# Patient Record
Sex: MALE | Race: WHITE | Employment: OTHER | URBAN - METROPOLITAN AREA
[De-identification: names, ages, dates, MRNs, and addresses within clinical notes are randomized per-mention and may not be internally consistent; named-entity substitution may affect disease eponyms.]

---

## 2017-01-12 ENCOUNTER — TELEPHONE (OUTPATIENT)
Dept: FAMILY MEDICINE CLINIC | Age: 77
End: 2017-01-12

## 2017-01-20 ENCOUNTER — OFFICE VISIT (OUTPATIENT)
Dept: FAMILY MEDICINE CLINIC | Age: 77
End: 2017-01-20

## 2017-01-20 VITALS
HEART RATE: 98 BPM | OXYGEN SATURATION: 95 % | WEIGHT: 190.7 LBS | DIASTOLIC BLOOD PRESSURE: 60 MMHG | SYSTOLIC BLOOD PRESSURE: 120 MMHG | HEIGHT: 68 IN | BODY MASS INDEX: 28.9 KG/M2

## 2017-01-20 DIAGNOSIS — I65.23 BILATERAL CAROTID ARTERY STENOSIS: ICD-10-CM

## 2017-01-20 DIAGNOSIS — E78.2 MIXED HYPERLIPIDEMIA: ICD-10-CM

## 2017-01-20 DIAGNOSIS — E11.8 TYPE 2 DIABETES MELLITUS WITH COMPLICATION, WITHOUT LONG-TERM CURRENT USE OF INSULIN (HCC): ICD-10-CM

## 2017-01-20 DIAGNOSIS — R42 VERTIGO: ICD-10-CM

## 2017-01-20 DIAGNOSIS — I71.40 ABDOMINAL AORTIC ANEURYSM WITHOUT RUPTURE: ICD-10-CM

## 2017-01-20 DIAGNOSIS — E11.21 TYPE 2 DIABETES MELLITUS WITH DIABETIC NEPHROPATHY, WITHOUT LONG-TERM CURRENT USE OF INSULIN (HCC): ICD-10-CM

## 2017-01-20 DIAGNOSIS — N52.9 VASCULOGENIC ERECTILE DYSFUNCTION, UNSPECIFIED VASCULOGENIC ERECTILE DYSFUNCTION TYPE: ICD-10-CM

## 2017-01-20 PROCEDURE — 99214 OFFICE O/P EST MOD 30 MIN: CPT | Performed by: FAMILY MEDICINE

## 2017-01-20 ASSESSMENT — ENCOUNTER SYMPTOMS
ABDOMINAL PAIN: 0
EYE REDNESS: 0
CHEST TIGHTNESS: 0
EYE PAIN: 0
CHOKING: 0
PHOTOPHOBIA: 0
ANAL BLEEDING: 0
STRIDOR: 0
EYE ITCHING: 0
EYE DISCHARGE: 0
RHINORRHEA: 0
ABDOMINAL DISTENTION: 0
SINUS PRESSURE: 0
BACK PAIN: 0
FACIAL SWELLING: 0
COUGH: 0
BLOOD IN STOOL: 0
WHEEZING: 0
APNEA: 0
SHORTNESS OF BREATH: 0
COLOR CHANGE: 0

## 2017-02-21 ENCOUNTER — TELEPHONE (OUTPATIENT)
Dept: FAMILY MEDICINE CLINIC | Age: 77
End: 2017-02-21

## 2017-02-21 DIAGNOSIS — Z12.83 SKIN EXAM, SCREENING FOR CANCER: Primary | ICD-10-CM

## 2017-02-27 ENCOUNTER — TELEPHONE (OUTPATIENT)
Dept: FAMILY MEDICINE CLINIC | Age: 77
End: 2017-02-27

## 2017-02-27 RX ORDER — AMLODIPINE BESYLATE 10 MG/1
TABLET ORAL
Qty: 90 TABLET | Refills: 3 | Status: SHIPPED | OUTPATIENT
Start: 2017-02-27 | End: 2018-01-08 | Stop reason: SDUPTHER

## 2017-02-27 RX ORDER — LISINOPRIL AND HYDROCHLOROTHIAZIDE 12.5; 1 MG/1; MG/1
TABLET ORAL
Qty: 90 TABLET | Refills: 3 | Status: SHIPPED | OUTPATIENT
Start: 2017-02-27 | End: 2018-01-08 | Stop reason: SDUPTHER

## 2017-02-27 RX ORDER — PRAVASTATIN SODIUM 80 MG/1
TABLET ORAL
Qty: 90 TABLET | Refills: 3 | Status: SHIPPED | OUTPATIENT
Start: 2017-02-27 | End: 2018-01-08 | Stop reason: SDUPTHER

## 2017-04-12 ENCOUNTER — OFFICE VISIT (OUTPATIENT)
Dept: FAMILY MEDICINE CLINIC | Age: 77
End: 2017-04-12

## 2017-04-12 VITALS
DIASTOLIC BLOOD PRESSURE: 80 MMHG | BODY MASS INDEX: 29.19 KG/M2 | WEIGHT: 192.6 LBS | HEIGHT: 68 IN | HEART RATE: 97 BPM | OXYGEN SATURATION: 96 % | SYSTOLIC BLOOD PRESSURE: 130 MMHG

## 2017-04-12 DIAGNOSIS — I65.23 BILATERAL CAROTID ARTERY STENOSIS: ICD-10-CM

## 2017-04-12 DIAGNOSIS — N52.9 VASCULOGENIC ERECTILE DYSFUNCTION, UNSPECIFIED VASCULOGENIC ERECTILE DYSFUNCTION TYPE: ICD-10-CM

## 2017-04-12 DIAGNOSIS — I10 ESSENTIAL HYPERTENSION: ICD-10-CM

## 2017-04-12 DIAGNOSIS — R80.9 MICROALBUMINURIA: ICD-10-CM

## 2017-04-12 DIAGNOSIS — Z00.00 WELL ADULT EXAM: Primary | ICD-10-CM

## 2017-04-12 DIAGNOSIS — I71.40 ABDOMINAL AORTIC ANEURYSM WITHOUT RUPTURE: ICD-10-CM

## 2017-04-12 DIAGNOSIS — E11.8 TYPE 2 DIABETES MELLITUS WITH COMPLICATION, WITHOUT LONG-TERM CURRENT USE OF INSULIN (HCC): ICD-10-CM

## 2017-04-12 LAB
A/G RATIO: 1.6 (ref 1.1–2.2)
ALBUMIN SERPL-MCNC: 4.2 G/DL (ref 3.4–5)
ALP BLD-CCNC: 71 U/L (ref 40–129)
ALT SERPL-CCNC: 13 U/L (ref 10–40)
ANION GAP SERPL CALCULATED.3IONS-SCNC: 14 MMOL/L (ref 3–16)
AST SERPL-CCNC: 14 U/L (ref 15–37)
BILIRUB SERPL-MCNC: <0.2 MG/DL (ref 0–1)
BUN BLDV-MCNC: 24 MG/DL (ref 7–20)
CALCIUM SERPL-MCNC: 10 MG/DL (ref 8.3–10.6)
CHLORIDE BLD-SCNC: 99 MMOL/L (ref 99–110)
CHOLESTEROL, TOTAL: 150 MG/DL (ref 0–199)
CO2: 29 MMOL/L (ref 21–32)
CREAT SERPL-MCNC: 1.2 MG/DL (ref 0.8–1.3)
CREATININE URINE: 84.8 MG/DL (ref 39–259)
GFR AFRICAN AMERICAN: >60
GFR NON-AFRICAN AMERICAN: 58
GLOBULIN: 2.7 G/DL
GLUCOSE BLD-MCNC: 114 MG/DL (ref 70–99)
HDLC SERPL-MCNC: 40 MG/DL (ref 40–60)
LDL CHOLESTEROL CALCULATED: 73 MG/DL
MICROALBUMIN UR-MCNC: 4.4 MG/DL
MICROALBUMIN/CREAT UR-RTO: 51.9 MG/G (ref 0–30)
POTASSIUM SERPL-SCNC: 4.9 MMOL/L (ref 3.5–5.1)
SODIUM BLD-SCNC: 142 MMOL/L (ref 136–145)
TOTAL PROTEIN: 6.9 G/DL (ref 6.4–8.2)
TRIGL SERPL-MCNC: 187 MG/DL (ref 0–150)
VLDLC SERPL CALC-MCNC: 37 MG/DL

## 2017-04-12 PROCEDURE — 93000 ELECTROCARDIOGRAM COMPLETE: CPT | Performed by: FAMILY MEDICINE

## 2017-04-12 PROCEDURE — G0439 PPPS, SUBSEQ VISIT: HCPCS | Performed by: FAMILY MEDICINE

## 2017-04-13 LAB
BASOPHILS ABSOLUTE: 0 K/UL (ref 0–0.2)
BASOPHILS RELATIVE PERCENT: 0.5 %
EOSINOPHILS ABSOLUTE: 0.3 K/UL (ref 0–0.6)
EOSINOPHILS RELATIVE PERCENT: 4.1 %
ESTIMATED AVERAGE GLUCOSE: 168.6 MG/DL
HBA1C MFR BLD: 7.5 %
HCT VFR BLD CALC: 48.3 % (ref 40.5–52.5)
HEMOGLOBIN: 15.3 G/DL (ref 13.5–17.5)
LYMPHOCYTES ABSOLUTE: 2.5 K/UL (ref 1–5.1)
LYMPHOCYTES RELATIVE PERCENT: 31.7 %
MCH RBC QN AUTO: 29.3 PG (ref 26–34)
MCHC RBC AUTO-ENTMCNC: 31.7 G/DL (ref 31–36)
MCV RBC AUTO: 92.4 FL (ref 80–100)
MONOCYTES ABSOLUTE: 0.8 K/UL (ref 0–1.3)
MONOCYTES RELATIVE PERCENT: 10.3 %
NEUTROPHILS ABSOLUTE: 4.2 K/UL (ref 1.7–7.7)
NEUTROPHILS RELATIVE PERCENT: 53.4 %
PDW BLD-RTO: 14.2 % (ref 12.4–15.4)
PLATELET # BLD: 264 K/UL (ref 135–450)
PMV BLD AUTO: 9.5 FL (ref 5–10.5)
RBC # BLD: 5.23 M/UL (ref 4.2–5.9)
WBC # BLD: 7.9 K/UL (ref 4–11)

## 2017-06-19 ENCOUNTER — TELEPHONE (OUTPATIENT)
Dept: FAMILY MEDICINE CLINIC | Age: 77
End: 2017-06-19

## 2018-01-08 RX ORDER — PRAVASTATIN SODIUM 80 MG/1
TABLET ORAL
Qty: 90 TABLET | Refills: 3 | Status: SHIPPED | OUTPATIENT
Start: 2018-01-08 | End: 2018-02-05 | Stop reason: SDUPTHER

## 2018-01-08 RX ORDER — LISINOPRIL AND HYDROCHLOROTHIAZIDE 12.5; 1 MG/1; MG/1
TABLET ORAL
Qty: 90 TABLET | Refills: 3 | Status: SHIPPED | OUTPATIENT
Start: 2018-01-08 | End: 2018-02-05 | Stop reason: SDUPTHER

## 2018-01-08 RX ORDER — GLUCOSAM/CHON-MSM1/C/MANG/BOSW 500-416.6
100 TABLET ORAL 2 TIMES DAILY WITH MEALS
Qty: 100 EACH | Refills: 5 | Status: SHIPPED | OUTPATIENT
Start: 2018-01-08 | End: 2018-02-05 | Stop reason: SDUPTHER

## 2018-01-08 RX ORDER — BLOOD-GLUCOSE METER
EACH MISCELLANEOUS
Qty: 100 DEVICE | Refills: 3 | Status: SHIPPED | OUTPATIENT
Start: 2018-01-08 | End: 2018-02-05 | Stop reason: SDUPTHER

## 2018-01-08 RX ORDER — AMLODIPINE BESYLATE 10 MG/1
TABLET ORAL
Qty: 90 TABLET | Refills: 3 | Status: SHIPPED | OUTPATIENT
Start: 2018-01-08 | End: 2018-02-05 | Stop reason: SDUPTHER

## 2018-01-08 RX ORDER — BLOOD-GLUCOSE CONTROL, LOW
EACH MISCELLANEOUS
Qty: 6 EACH | Refills: 3 | Status: SHIPPED | OUTPATIENT
Start: 2018-01-08 | End: 2018-02-05 | Stop reason: SDUPTHER

## 2018-02-05 RX ORDER — AMLODIPINE BESYLATE 10 MG/1
TABLET ORAL
Qty: 90 TABLET | Refills: 3 | Status: SHIPPED | OUTPATIENT
Start: 2018-02-05 | End: 2019-04-29 | Stop reason: SDUPTHER

## 2018-02-05 RX ORDER — PRAVASTATIN SODIUM 80 MG/1
TABLET ORAL
Qty: 90 TABLET | Refills: 3 | Status: SHIPPED | OUTPATIENT
Start: 2018-02-05 | End: 2019-04-29 | Stop reason: SDUPTHER

## 2018-02-05 RX ORDER — BLOOD-GLUCOSE CONTROL, LOW
EACH MISCELLANEOUS
Qty: 6 EACH | Refills: 3 | Status: SHIPPED | OUTPATIENT
Start: 2018-02-05

## 2018-02-05 RX ORDER — LISINOPRIL AND HYDROCHLOROTHIAZIDE 12.5; 1 MG/1; MG/1
TABLET ORAL
Qty: 90 TABLET | Refills: 3 | Status: SHIPPED | OUTPATIENT
Start: 2018-02-05 | End: 2019-04-29 | Stop reason: SDUPTHER

## 2018-02-05 RX ORDER — BLOOD-GLUCOSE METER
EACH MISCELLANEOUS
Qty: 100 DEVICE | Refills: 3 | Status: SHIPPED | OUTPATIENT
Start: 2018-02-05

## 2018-02-05 RX ORDER — GLUCOSAM/CHON-MSM1/C/MANG/BOSW 500-416.6
100 TABLET ORAL 2 TIMES DAILY WITH MEALS
Qty: 100 EACH | Refills: 5 | Status: SHIPPED | OUTPATIENT
Start: 2018-02-05

## 2018-04-30 ENCOUNTER — OFFICE VISIT (OUTPATIENT)
Dept: FAMILY MEDICINE CLINIC | Age: 78
End: 2018-04-30

## 2018-04-30 VITALS
BODY MASS INDEX: 30.58 KG/M2 | SYSTOLIC BLOOD PRESSURE: 120 MMHG | WEIGHT: 201.8 LBS | HEIGHT: 68 IN | HEART RATE: 91 BPM | OXYGEN SATURATION: 95 % | DIASTOLIC BLOOD PRESSURE: 80 MMHG

## 2018-04-30 DIAGNOSIS — E78.2 MIXED HYPERLIPIDEMIA: ICD-10-CM

## 2018-04-30 DIAGNOSIS — E11.8 TYPE 2 DIABETES MELLITUS WITH COMPLICATION, WITHOUT LONG-TERM CURRENT USE OF INSULIN (HCC): ICD-10-CM

## 2018-04-30 DIAGNOSIS — E55.9 VITAMIN D DEFICIENCY: ICD-10-CM

## 2018-04-30 DIAGNOSIS — Z00.00 WELL ADULT EXAM: Primary | ICD-10-CM

## 2018-04-30 DIAGNOSIS — I65.23 BILATERAL CAROTID ARTERY STENOSIS: ICD-10-CM

## 2018-04-30 DIAGNOSIS — I71.40 ABDOMINAL AORTIC ANEURYSM WITHOUT RUPTURE: ICD-10-CM

## 2018-04-30 DIAGNOSIS — R80.9 MICROALBUMINURIA: ICD-10-CM

## 2018-04-30 LAB
A/G RATIO: 1.5 (ref 1.1–2.2)
ALBUMIN SERPL-MCNC: 4.5 G/DL (ref 3.4–5)
ALP BLD-CCNC: 78 U/L (ref 40–129)
ALT SERPL-CCNC: 18 U/L (ref 10–40)
ANION GAP SERPL CALCULATED.3IONS-SCNC: 15 MMOL/L (ref 3–16)
AST SERPL-CCNC: 16 U/L (ref 15–37)
BASOPHILS ABSOLUTE: 0 K/UL (ref 0–0.2)
BASOPHILS RELATIVE PERCENT: 0.5 %
BILIRUB SERPL-MCNC: 0.4 MG/DL (ref 0–1)
BUN BLDV-MCNC: 20 MG/DL (ref 7–20)
CALCIUM SERPL-MCNC: 10.1 MG/DL (ref 8.3–10.6)
CHLORIDE BLD-SCNC: 97 MMOL/L (ref 99–110)
CHOLESTEROL, TOTAL: 153 MG/DL (ref 0–199)
CO2: 28 MMOL/L (ref 21–32)
CREAT SERPL-MCNC: 1.3 MG/DL (ref 0.8–1.3)
CREATININE URINE: 87.9 MG/DL (ref 39–259)
EOSINOPHILS ABSOLUTE: 0.3 K/UL (ref 0–0.6)
EOSINOPHILS RELATIVE PERCENT: 3.5 %
GFR AFRICAN AMERICAN: >60
GFR NON-AFRICAN AMERICAN: 53
GLOBULIN: 3 G/DL
GLUCOSE BLD-MCNC: 171 MG/DL (ref 70–99)
HCT VFR BLD CALC: 47.2 % (ref 40.5–52.5)
HDLC SERPL-MCNC: 45 MG/DL (ref 40–60)
HEMOGLOBIN: 15.5 G/DL (ref 13.5–17.5)
LDL CHOLESTEROL CALCULATED: 78 MG/DL
LYMPHOCYTES ABSOLUTE: 2.3 K/UL (ref 1–5.1)
LYMPHOCYTES RELATIVE PERCENT: 30.7 %
MCH RBC QN AUTO: 29.5 PG (ref 26–34)
MCHC RBC AUTO-ENTMCNC: 32.9 G/DL (ref 31–36)
MCV RBC AUTO: 89.7 FL (ref 80–100)
MICROALBUMIN UR-MCNC: 9.8 MG/DL
MICROALBUMIN/CREAT UR-RTO: 111.5 MG/G (ref 0–30)
MONOCYTES ABSOLUTE: 0.7 K/UL (ref 0–1.3)
MONOCYTES RELATIVE PERCENT: 9.9 %
NEUTROPHILS ABSOLUTE: 4.2 K/UL (ref 1.7–7.7)
NEUTROPHILS RELATIVE PERCENT: 55.4 %
PDW BLD-RTO: 13.2 % (ref 12.4–15.4)
PLATELET # BLD: 292 K/UL (ref 135–450)
PMV BLD AUTO: 9.4 FL (ref 5–10.5)
POTASSIUM SERPL-SCNC: 4.6 MMOL/L (ref 3.5–5.1)
RBC # BLD: 5.26 M/UL (ref 4.2–5.9)
SODIUM BLD-SCNC: 140 MMOL/L (ref 136–145)
TOTAL PROTEIN: 7.5 G/DL (ref 6.4–8.2)
TRIGL SERPL-MCNC: 150 MG/DL (ref 0–150)
VITAMIN D 25-HYDROXY: 52.8 NG/ML
VLDLC SERPL CALC-MCNC: 30 MG/DL
WBC # BLD: 7.5 K/UL (ref 4–11)

## 2018-04-30 PROCEDURE — G8598 ASA/ANTIPLAT THER USED: HCPCS | Performed by: FAMILY MEDICINE

## 2018-04-30 PROCEDURE — 4040F PNEUMOC VAC/ADMIN/RCVD: CPT | Performed by: FAMILY MEDICINE

## 2018-04-30 PROCEDURE — G0439 PPPS, SUBSEQ VISIT: HCPCS | Performed by: FAMILY MEDICINE

## 2018-04-30 ASSESSMENT — PATIENT HEALTH QUESTIONNAIRE - PHQ9
2. FEELING DOWN, DEPRESSED OR HOPELESS: 0
SUM OF ALL RESPONSES TO PHQ9 QUESTIONS 1 & 2: 0
SUM OF ALL RESPONSES TO PHQ QUESTIONS 1-9: 0
1. LITTLE INTEREST OR PLEASURE IN DOING THINGS: 0

## 2018-05-01 LAB
ESTIMATED AVERAGE GLUCOSE: 200.1 MG/DL
HBA1C MFR BLD: 8.6 %

## 2018-08-01 ENCOUNTER — OFFICE VISIT (OUTPATIENT)
Dept: FAMILY MEDICINE CLINIC | Age: 78
End: 2018-08-01

## 2018-08-01 VITALS
BODY MASS INDEX: 29.58 KG/M2 | OXYGEN SATURATION: 96 % | HEART RATE: 81 BPM | SYSTOLIC BLOOD PRESSURE: 120 MMHG | WEIGHT: 195.2 LBS | HEIGHT: 68 IN | DIASTOLIC BLOOD PRESSURE: 78 MMHG

## 2018-08-01 DIAGNOSIS — E78.2 MIXED HYPERLIPIDEMIA: ICD-10-CM

## 2018-08-01 DIAGNOSIS — I10 ESSENTIAL HYPERTENSION: ICD-10-CM

## 2018-08-01 DIAGNOSIS — I71.40 ABDOMINAL AORTIC ANEURYSM WITHOUT RUPTURE: ICD-10-CM

## 2018-08-01 DIAGNOSIS — E11.8 TYPE 2 DIABETES MELLITUS WITH COMPLICATION, WITHOUT LONG-TERM CURRENT USE OF INSULIN (HCC): Primary | ICD-10-CM

## 2018-08-01 DIAGNOSIS — I65.23 BILATERAL CAROTID ARTERY STENOSIS: ICD-10-CM

## 2018-08-01 LAB
ANION GAP SERPL CALCULATED.3IONS-SCNC: 17 MMOL/L (ref 3–16)
BUN BLDV-MCNC: 26 MG/DL (ref 7–20)
CALCIUM SERPL-MCNC: 10.4 MG/DL (ref 8.3–10.6)
CHLORIDE BLD-SCNC: 102 MMOL/L (ref 99–110)
CO2: 24 MMOL/L (ref 21–32)
CREAT SERPL-MCNC: 1.7 MG/DL (ref 0.8–1.3)
GFR AFRICAN AMERICAN: 47
GFR NON-AFRICAN AMERICAN: 39
GLUCOSE BLD-MCNC: 124 MG/DL (ref 70–99)
POTASSIUM SERPL-SCNC: 4.8 MMOL/L (ref 3.5–5.1)
SODIUM BLD-SCNC: 143 MMOL/L (ref 136–145)

## 2018-08-01 PROCEDURE — G8427 DOCREV CUR MEDS BY ELIG CLIN: HCPCS | Performed by: FAMILY MEDICINE

## 2018-08-01 PROCEDURE — G8598 ASA/ANTIPLAT THER USED: HCPCS | Performed by: FAMILY MEDICINE

## 2018-08-01 PROCEDURE — 1101F PT FALLS ASSESS-DOCD LE1/YR: CPT | Performed by: FAMILY MEDICINE

## 2018-08-01 PROCEDURE — G8417 CALC BMI ABV UP PARAM F/U: HCPCS | Performed by: FAMILY MEDICINE

## 2018-08-01 PROCEDURE — 1123F ACP DISCUSS/DSCN MKR DOCD: CPT | Performed by: FAMILY MEDICINE

## 2018-08-01 PROCEDURE — 1036F TOBACCO NON-USER: CPT | Performed by: FAMILY MEDICINE

## 2018-08-01 PROCEDURE — 4040F PNEUMOC VAC/ADMIN/RCVD: CPT | Performed by: FAMILY MEDICINE

## 2018-08-01 PROCEDURE — 99214 OFFICE O/P EST MOD 30 MIN: CPT | Performed by: FAMILY MEDICINE

## 2018-08-01 ASSESSMENT — ENCOUNTER SYMPTOMS
ABDOMINAL PAIN: 0
WHEEZING: 0
PHOTOPHOBIA: 0
ANAL BLEEDING: 0
ABDOMINAL DISTENTION: 0
COLOR CHANGE: 0
CHEST TIGHTNESS: 0
EYE ITCHING: 0
FACIAL SWELLING: 0
CHOKING: 0
BLOOD IN STOOL: 0
EYE PAIN: 0
RHINORRHEA: 0
COUGH: 0
APNEA: 0
BACK PAIN: 0
EYE REDNESS: 0
SHORTNESS OF BREATH: 0
EYE DISCHARGE: 0
SINUS PRESSURE: 0
STRIDOR: 0

## 2018-08-01 NOTE — PROGRESS NOTES
Subjective:      Patient ID: Noram Pereira is a 76 y.o. male. HPI   Treatment Adherence:   Medication compliance:  compliant most of the time  Diet compliance:  compliant most of the time  Weight trend: stable  Current exercise: walks 3 time(s) per week  Barriers: none    Diabetes Mellitus Type 2: Current symptoms/problems include none. Home blood sugar records: fasting range: 140  Any episodes of hypoglycemia? no  Eye exam current (within one year): no  Tobacco history: He  reports that he quit smoking about 12 years ago. He has never used smokeless tobacco.   Daily Aspirin? Yes    Hypertension:  Home blood pressure monitoring: No.  He is adherent to a low sodium diet. Patient denies chest pain, shortness of breath, headache, lightheadedness, blurred vision, peripheral edema, palpitations, dry cough and fatigue. Antihypertensive medication side effects: no medication side effects noted. Use of agents associated with hypertension: none. Hyperlipidemia:  No new myalgias or GI upset on pravastatin (Pravachol). Lab Results   Component Value Date    LABA1C 8.6 04/30/2018    LABA1C 7.5 04/12/2017    LABA1C 7.6 04/08/2016     Lab Results   Component Value Date    LABMICR 9.80 (H) 04/30/2018    CREATININE 1.3 04/30/2018     Lab Results   Component Value Date    ALT 18 04/30/2018    AST 16 04/30/2018     Lab Results   Component Value Date    CHOL 153 04/30/2018    TRIG 150 04/30/2018    HDL 45 04/30/2018    LDLCALC 78 04/30/2018        has No Known Allergies.       Current Outpatient Prescriptions:     metFORMIN (GLUCOPHAGE) 500 MG tablet, Take 2 tablets by mouth 2 times daily (with meals) (Patient taking differently: Take 1,000 mg by mouth daily (with breakfast) ), Disp: 360 tablet, Rfl: 3    amLODIPine (NORVASC) 10 MG tablet, TAKE 1 TABLET EVERY DAY, Disp: 90 tablet, Rfl: 3    pravastatin (PRAVACHOL) 80 MG tablet, TAKE 1 TABLET EVERY DAY, Disp: 90 tablet, Rfl: 3    lisinopril-hydrochlorothiazide (PRINZIDE;ZESTORETIC) 10-12.5 MG per tablet, TAKE 1 TABLET EVERY DAY, Disp: 90 tablet, Rfl: 3    Blood Glucose Monitoring Suppl (TRUE METRIX AIR GLUCOSE METER) GISSELLE, Check BID, Disp: 100 Device, Rfl: 3    glucose blood VI test strips (TRUE METRIX BLOOD GLUCOSE TEST) strip, 1 each by In Vitro route daily As needed. , Disp: 100 each, Rfl: 3    TRUEPLUS LANCETS 33G MISC, 100 each by Does not apply route 2 times daily (with meals), Disp: 100 each, Rfl: 5    Blood Glucose Calibration (TRUE METRIX LEVEL 2) Normal SOLN, Check weekly, Disp: 6 each, Rfl: 3    nitroGLYCERIN (NITROSTAT) 0.4 MG SL tablet, Place 1 tablet under the tongue every 5 minutes as needed, Disp: 25 tablet, Rfl: 3    Ascorbic Acid (VITAMIN C) 500 MG tablet, Take 500 mg by mouth daily. , Disp: , Rfl:     aspirin 81 MG chewable tablet, Take 81 mg by mouth daily. , Disp: , Rfl:      has a past medical history of CAD (coronary artery disease); Hyperlipidemia; Hypertension; and Type II or unspecified type diabetes mellitus without mention of complication, not stated as uncontrolled. Past Surgical History:   Procedure Laterality Date    CORONARY ANGIOPLASTY WITH STENT PLACEMENT  2004        reports that he quit smoking about 12 years ago. He has never used smokeless tobacco. He reports that he drinks about 1.2 oz of alcohol per week . He reports that he does not use drugs. family history includes Emphysema in his father; Heart Disease in his father and mother; High Cholesterol in his sister.   Immunization History   Administered Date(s) Administered    Influenza Vaccine, unspecified formulation 01/11/2017    Influenza Virus Vaccine 10/10/2011, 12/18/2012    Influenza, High Dose 11/06/2015    Pneumococcal 13-valent Conjugate ([id number]) 11/06/2015    Pneumococcal Polysaccharide ([id number]) 10/21/2008    Tdap (Boostrix, Adacel) 04/13/2015    Zoster Live (Zostavax) 12/18/2012     Review of Systems   Constitutional: Negative for activity change, appetite change, chills, diaphoresis, fatigue, fever and unexpected weight change. HENT: Negative for congestion, dental problem, drooling, ear discharge, ear pain, facial swelling, hearing loss, nosebleeds, postnasal drip, rhinorrhea, sinus pressure, sneezing and tinnitus. Eyes: Negative for photophobia, pain, discharge, redness, itching and visual disturbance. Respiratory: Negative for apnea, cough, choking, chest tightness, shortness of breath, wheezing and stridor. Cardiovascular: Negative for chest pain, palpitations and leg swelling. Gastrointestinal: Negative for abdominal distention, abdominal pain, anal bleeding and blood in stool. Genitourinary: Negative for decreased urine volume, difficulty urinating, discharge, dysuria, enuresis, flank pain, frequency, genital sores, hematuria, penile pain, penile swelling, scrotal swelling, testicular pain and urgency. Musculoskeletal: Negative for arthralgias, back pain, gait problem, joint swelling, myalgias, neck pain and neck stiffness. Skin: Negative for color change, pallor and rash. Neurological: Negative for dizziness, tremors, seizures, syncope, facial asymmetry, speech difficulty, weakness, light-headedness, numbness and headaches. Hematological: Negative for adenopathy. Does not bruise/bleed easily. Psychiatric/Behavioral: Negative for agitation, behavioral problems, confusion, decreased concentration, dysphoric mood, hallucinations, self-injury and sleep disturbance. The patient is not nervous/anxious and is not hyperactive. BP Readings from Last 2 Encounters:   08/01/18 120/78   04/30/18 120/80     Hemoglobin A1C (%)   Date Value   04/30/2018 8.6     Microalbumin, Random Urine (mg/dL)   Date Value   04/30/2018 9.80 (H)     LDL Calculated (mg/dL)   Date Value   04/30/2018 78              Tobacco use:  Patient  reports that he quit smoking about 12 years ago.  He has never used smokeless tobacco.    If Smoker - Cessation 89.7  80.0 - 100.0 fL Final    MCH 04/30/2018 29.5  26.0 - 34.0 pg Final    MCHC 04/30/2018 32.9  31.0 - 36.0 g/dL Final    RDW 04/30/2018 13.2  12.4 - 15.4 % Final    Platelets 16/68/9289 292  135 - 450 K/uL Final    MPV 04/30/2018 9.4  5.0 - 10.5 fL Final    Neutrophils % 04/30/2018 55.4  % Final    Lymphocytes % 04/30/2018 30.7  % Final    Monocytes % 04/30/2018 9.9  % Final    Eosinophils % 04/30/2018 3.5  % Final    Basophils % 04/30/2018 0.5  % Final    Neutrophils # 04/30/2018 4.2  1.7 - 7.7 K/uL Final    Lymphocytes # 04/30/2018 2.3  1.0 - 5.1 K/uL Final    Monocytes # 04/30/2018 0.7  0.0 - 1.3 K/uL Final    Eosinophils # 04/30/2018 0.3  0.0 - 0.6 K/uL Final    Basophils # 04/30/2018 0.0  0.0 - 0.2 K/uL Final    Vit D, 25-Hydroxy 04/30/2018 52.8  >=30 ng/mL Final       Assessment:      Abdominal aortic aneurysm without rupture ([facility] [address]  Recheck with screen this year    Bilateral carotid artery stenosis  Will recheck with screen    Diabetes mellitus type 2 with complications ([facility] [address]  Labs--reviewed home numbers and OK    Essential hypertension  Stable with current plan--TOS and SE discussed    Hyperlipidemia  Labs and TOS          Plan:      Keila Sanchez received counseling on the following healthy behaviors: nutrition and medication adherence    Patient given educational materials on Diabetes, Hyperlipidemia and Hypertension    I have instructed Keila Sanchez to complete a self tracking handout on Blood Sugars  and Blood Pressures  and instructed them to bring it with them to his next appointment. Discussed use, benefit, and side effects of prescribed medications. Barriers to medication compliance addressed. All patient questions answered. Pt voiced understanding.

## 2018-08-02 LAB
ESTIMATED AVERAGE GLUCOSE: 171.4 MG/DL
HBA1C MFR BLD: 7.6 %

## 2019-04-29 RX ORDER — AMLODIPINE BESYLATE 10 MG/1
TABLET ORAL
Qty: 90 TABLET | Refills: 3 | Status: SHIPPED | OUTPATIENT
Start: 2019-04-29 | End: 2019-05-03 | Stop reason: SDUPTHER

## 2019-04-29 RX ORDER — PRAVASTATIN SODIUM 80 MG/1
TABLET ORAL
Qty: 90 TABLET | Refills: 3 | Status: SHIPPED | OUTPATIENT
Start: 2019-04-29 | End: 2019-05-03 | Stop reason: SDUPTHER

## 2019-04-29 RX ORDER — LISINOPRIL AND HYDROCHLOROTHIAZIDE 12.5; 1 MG/1; MG/1
TABLET ORAL
Qty: 90 TABLET | Refills: 3 | Status: SHIPPED | OUTPATIENT
Start: 2019-04-29 | End: 2019-05-03 | Stop reason: SDUPTHER

## 2019-05-03 ENCOUNTER — OFFICE VISIT (OUTPATIENT)
Dept: FAMILY MEDICINE CLINIC | Age: 79
End: 2019-05-03
Payer: MEDICARE

## 2019-05-03 VITALS
BODY MASS INDEX: 29.97 KG/M2 | DIASTOLIC BLOOD PRESSURE: 62 MMHG | WEIGHT: 194.2 LBS | SYSTOLIC BLOOD PRESSURE: 130 MMHG | OXYGEN SATURATION: 93 % | HEART RATE: 107 BPM

## 2019-05-03 DIAGNOSIS — Z00.00 ROUTINE GENERAL MEDICAL EXAMINATION AT A HEALTH CARE FACILITY: ICD-10-CM

## 2019-05-03 DIAGNOSIS — I10 ESSENTIAL HYPERTENSION: ICD-10-CM

## 2019-05-03 DIAGNOSIS — E11.8 TYPE 2 DIABETES MELLITUS WITH COMPLICATION, WITHOUT LONG-TERM CURRENT USE OF INSULIN (HCC): ICD-10-CM

## 2019-05-03 DIAGNOSIS — I71.40 ABDOMINAL AORTIC ANEURYSM WITHOUT RUPTURE: ICD-10-CM

## 2019-05-03 DIAGNOSIS — I25.10 CORONARY ARTERY DISEASE INVOLVING NATIVE HEART WITHOUT ANGINA PECTORIS, UNSPECIFIED VESSEL OR LESION TYPE: ICD-10-CM

## 2019-05-03 DIAGNOSIS — E78.2 MIXED HYPERLIPIDEMIA: ICD-10-CM

## 2019-05-03 DIAGNOSIS — E55.9 VITAMIN D DEFICIENCY: ICD-10-CM

## 2019-05-03 DIAGNOSIS — I65.23 BILATERAL CAROTID ARTERY STENOSIS: ICD-10-CM

## 2019-05-03 DIAGNOSIS — Z00.00 WELL ADULT EXAM: Primary | ICD-10-CM

## 2019-05-03 PROCEDURE — 4040F PNEUMOC VAC/ADMIN/RCVD: CPT | Performed by: FAMILY MEDICINE

## 2019-05-03 PROCEDURE — G0438 PPPS, INITIAL VISIT: HCPCS | Performed by: FAMILY MEDICINE

## 2019-05-03 PROCEDURE — 1123F ACP DISCUSS/DSCN MKR DOCD: CPT | Performed by: FAMILY MEDICINE

## 2019-05-03 PROCEDURE — G8598 ASA/ANTIPLAT THER USED: HCPCS | Performed by: FAMILY MEDICINE

## 2019-05-03 RX ORDER — PRAVASTATIN SODIUM 80 MG/1
TABLET ORAL
Qty: 90 TABLET | Refills: 3 | Status: CANCELLED | OUTPATIENT
Start: 2019-05-03

## 2019-05-03 RX ORDER — LISINOPRIL AND HYDROCHLOROTHIAZIDE 12.5; 1 MG/1; MG/1
TABLET ORAL
Qty: 90 TABLET | Refills: 3 | Status: SHIPPED | OUTPATIENT
Start: 2019-05-03 | End: 2019-10-23 | Stop reason: ALTCHOICE

## 2019-05-03 RX ORDER — AMLODIPINE BESYLATE 10 MG/1
TABLET ORAL
Qty: 90 TABLET | Refills: 3 | Status: SHIPPED | OUTPATIENT
Start: 2019-05-03 | End: 2019-10-23 | Stop reason: SDUPTHER

## 2019-05-03 RX ORDER — LISINOPRIL AND HYDROCHLOROTHIAZIDE 12.5; 1 MG/1; MG/1
TABLET ORAL
Qty: 90 TABLET | Refills: 3 | Status: CANCELLED | OUTPATIENT
Start: 2019-05-03

## 2019-05-03 RX ORDER — PRAVASTATIN SODIUM 80 MG/1
TABLET ORAL
Qty: 90 TABLET | Refills: 3 | Status: SHIPPED | OUTPATIENT
Start: 2019-05-03 | End: 2019-10-23 | Stop reason: ALTCHOICE

## 2019-05-03 RX ORDER — AMLODIPINE BESYLATE 10 MG/1
TABLET ORAL
Qty: 90 TABLET | Refills: 3 | Status: CANCELLED | OUTPATIENT
Start: 2019-05-03

## 2019-05-03 ASSESSMENT — LIFESTYLE VARIABLES
HOW OFTEN DURING THE LAST YEAR HAVE YOU FOUND THAT YOU WERE NOT ABLE TO STOP DRINKING ONCE YOU HAD STARTED: 0
AUDIT-C TOTAL SCORE: 2
HOW OFTEN DURING THE LAST YEAR HAVE YOU HAD A FEELING OF GUILT OR REMORSE AFTER DRINKING: 0
HOW OFTEN DURING THE LAST YEAR HAVE YOU NEEDED AN ALCOHOLIC DRINK FIRST THING IN THE MORNING TO GET YOURSELF GOING AFTER A NIGHT OF HEAVY DRINKING: 0
AUDIT TOTAL SCORE: 2
HOW OFTEN DURING THE LAST YEAR HAVE YOU BEEN UNABLE TO REMEMBER WHAT HAPPENED THE NIGHT BEFORE BECAUSE YOU HAD BEEN DRINKING: 0
HOW OFTEN DO YOU HAVE A DRINK CONTAINING ALCOHOL: 2
HOW MANY STANDARD DRINKS CONTAINING ALCOHOL DO YOU HAVE ON A TYPICAL DAY: 0
HOW OFTEN DURING THE LAST YEAR HAVE YOU FAILED TO DO WHAT WAS NORMALLY EXPECTED FROM YOU BECAUSE OF DRINKING: 0
HOW OFTEN DO YOU HAVE SIX OR MORE DRINKS ON ONE OCCASION: 0
HAS A RELATIVE, FRIEND, DOCTOR, OR ANOTHER HEALTH PROFESSIONAL EXPRESSED CONCERN ABOUT YOUR DRINKING OR SUGGESTED YOU CUT DOWN: 0
HAVE YOU OR SOMEONE ELSE BEEN INJURED AS A RESULT OF YOUR DRINKING: 0

## 2019-05-03 ASSESSMENT — ANXIETY QUESTIONNAIRES: GAD7 TOTAL SCORE: 0

## 2019-05-03 ASSESSMENT — PATIENT HEALTH QUESTIONNAIRE - PHQ9
SUM OF ALL RESPONSES TO PHQ QUESTIONS 1-9: 0
SUM OF ALL RESPONSES TO PHQ QUESTIONS 1-9: 0

## 2019-05-03 NOTE — PATIENT INSTRUCTIONS
Personalized Preventive Plan for Ray Naqvi - 5/3/2019  Medicare offers a range of preventive health benefits. Some of the tests and screenings are paid in full while other may be subject to a deductible, co-insurance, and/or copay. Some of these benefits include a comprehensive review of your medical history including lifestyle, illnesses that may run in your family, and various assessments and screenings as appropriate. After reviewing your medical record and screening and assessments performed today your provider may have ordered immunizations, labs, imaging, and/or referrals for you. A list of these orders (if applicable) as well as your Preventive Care list are included within your After Visit Summary for your review. Other Preventive Recommendations:    · A preventive eye exam performed by an eye specialist is recommended every 1-2 years to screen for glaucoma; cataracts, macular degeneration, and other eye disorders. · A preventive dental visit is recommended every 6 months. · Try to get at least 150 minutes of exercise per week or 10,000 steps per day on a pedometer . · Order or download the FREE \"Exercise & Physical Activity: Your Everyday Guide\" from [employer]. Call 8-493.163.9771 or search [employer] online. · You need 0514-0741 mg of calcium and 3117-9551 IU of vitamin D per day. It is possible to meet your calcium requirement with diet alone, but a vitamin D supplement is usually necessary to meet this goal.  · When exposed to the sun, use a sunscreen that protects against both UVA and UVB radiation with an SPF of 30 or greater. Reapply every 2 to 3 hours or after sweating, drying off with a towel, or swimming. · Always wear a seat belt when traveling in a car. Always wear a helmet when riding a bicycle or motorcycle.

## 2019-05-03 NOTE — PROGRESS NOTES
Medicare Annual Wellness Visit  Name: Chere Goldberg Date: 5/3/2019   MRN: [id number] Sex: Male   Age: [de-identified] y.o. Ethnicity: Non-/Non    : 1940 Race: Renetta Lawrence is here for Medicare AWV (AWV)    Screenings for behavioral, psychosocial and functional/safety risks, and cognitive dysfunction are all negative except as indicated below. These results, as well as other patient data from the [address] form, are documented in Flowsheets linked to this Encounter. No Known Allergies  Prior to Visit Medications    Medication Sig Taking? Authorizing Provider   pravastatin (PRAVACHOL) 80 MG tablet TAKE 1 TABLET EVERY DAY Yes Anyi Queen MD   amLODIPine (NORVASC) 10 MG tablet TAKE 1 TABLET EVERY DAY Yes Anyi Queen MD   lisinopril-hydrochlorothiazide (PRINZIDE;ZESTORETIC) 10-12.5 MG per tablet TAKE 1 Elmon Roscoe Yes Anyi Queen MD   blood glucose test strips (TRUE METRIX BLOOD GLUCOSE TEST) strip 1 each by In Vitro route daily As needed. Yes Anyi Queen MD   Blood Glucose Monitoring Suppl (TRUE METRIX AIR GLUCOSE METER) GISSELLE Check BID Yes Anyi Queen MD   TRUEPLUS LANCETS 33G MISC 100 each by Does not apply route 2 times daily (with meals) Yes Anyi Queen MD   Blood Glucose Calibration (TRUE METRIX LEVEL 2) Normal SOLN Check weekly Yes Anyi Queen MD   nitroGLYCERIN (NITROSTAT) 0.4 MG SL tablet Place 1 tablet under the tongue every 5 minutes as needed Yes Anyi Queen MD   Ascorbic Acid (VITAMIN C) 500 MG tablet Take 500 mg by mouth daily. Yes Historical Provider, MD   aspirin 81 MG chewable tablet Take 81 mg by mouth daily.  Yes Historical Provider, MD   metFORMIN (GLUCOPHAGE) 500 MG tablet Take 2 tablets by mouth 2 times daily (with meals)  Anyi Queen MD     Past Medical History:   Diagnosis Date    CAD (coronary artery disease)     Hyperlipidemia     Hypertension     Type II or unspecified type diabetes mellitus without mention of complication, not stated as uncontrolled      Past Surgical History:   Procedure Laterality Date    CORONARY ANGIOPLASTY WITH STENT PLACEMENT  2004     Family History   Problem Relation Age of Onset    Heart Disease Mother     Emphysema Father     Heart Disease Father     High Cholesterol Sister        CareTeam (Including outside providers/suppliers regularly involved in providing care):   Patient Care Team:  Javier Davenport MD as PCP - Rosana Schmitt MD as PCP - [facility] Attributed Provider  Javier Davenport MD (Family Medicine)  Abundio Loyola (Cardiology)  Delfina Buchanan MD as Consulting Physician (Ophthalmology)    Wt Readings from Last 3 Encounters:   05/03/19 194 lb 3.2 oz (88.1 kg)   08/01/18 195 lb 3.2 oz (88.5 kg)   04/30/18 201 lb 12.8 oz (91.5 kg)     Vitals:    05/03/19 1309   BP: 130/62   Site: Left Upper Arm   Position: Sitting   Cuff Size: Large Adult   Pulse: 107   SpO2: 93%   Weight: 194 lb 3.2 oz (88.1 kg)     Body mass index is 29.97 kg/m². Based upon direct observation of the patient, evaluation of cognition reveals recent and remote memory intact.     General Appearance: alert and oriented to person, place and time, well developed and well- nourished, in no acute distress  Skin: warm and dry, no rash or erythema  Head: normocephalic and atraumatic  Eyes: pupils equal, round, and reactive to light, extraocular eye movements intact, conjunctivae normal  ENT: tympanic membrane, external ear and ear canal normal bilaterally, nose without deformity, nasal mucosa and turbinates normal without polyps  Neck: supple and non-tender without mass, no thyromegaly or thyroid nodules, no cervical lymphadenopathy  Pulmonary/Chest: clear to auscultation bilaterally- no wheezes, rales or rhonchi, normal air movement, no respiratory distress  Cardiovascular: normal rate, regular rhythm, normal S1 and S2, no murmurs, rubs, clicks, or gallops, distal pulses intact, no carotid bruits  Abdomen: soft, non-tender, non-distended, normal bowel sounds, no masses or organomegaly  Genitourinary/Rectal: genitals normal without hernia or inguinal adenopathy, rectal normal without masses, prostate normal in size without masses or nodules  Extremities: no cyanosis, clubbing or edema  Musculoskeletal: normal range of motion, no joint swelling, deformity or tenderness  Neurologic: reflexes normal and symmetric, no cranial nerve deficit, gait, coordination and speech normal    Patient's complete Health Risk Assessment and screening values have been reviewed and are found in Flowsheets. The following problems were reviewed today and where indicated follow up appointments were made and/or referrals ordered. Positive Risk Factor Screenings with Interventions:     Health Habits/Nutrition:  Health Habits/Nutrition  Do you exercise for at least 20 minutes 2-3 times per week?: Yes  Have you lost any weight without trying in the past 3 months?: (!) Yes  Do you eat fewer than 2 meals per day?: No  Have you seen a dentist within the past year?: (!) No  Body mass index is 29.97 kg/m².   Health Habits/Nutrition Interventions:  · labs    Safety:  Safety  Do you have working smoke detectors?: Yes  Have all throw rugs been removed or fastened?: (!) No  Do you have non-slip mats in all bathtubs?: Yes  Do all of your stairways have a railing or banister?: Yes  Are your doorways, halls and stairs free of clutter?: Yes  Do you always fasten your seatbelt when you are in a car?: Yes  Safety Interventions:  · Home safety tips provided    Personalized Preventive Plan   Current Health Maintenance Status  Immunization History   Administered Date(s) Administered    Influenza Vaccine, unspecified formulation 01/11/2017    Influenza Virus Vaccine 10/10/2011, 12/18/2012    Influenza, High Dose (Fluzone 65 yrs and older) 11/06/2015    Pneumococcal 13-valent Conjugate

## 2019-05-06 DIAGNOSIS — E55.9 VITAMIN D DEFICIENCY: ICD-10-CM

## 2019-05-06 DIAGNOSIS — Z00.00 WELL ADULT EXAM: ICD-10-CM

## 2019-05-06 DIAGNOSIS — E11.8 TYPE 2 DIABETES MELLITUS WITH COMPLICATION, WITHOUT LONG-TERM CURRENT USE OF INSULIN (HCC): ICD-10-CM

## 2019-05-06 DIAGNOSIS — E78.2 MIXED HYPERLIPIDEMIA: ICD-10-CM

## 2019-05-06 LAB
A/G RATIO: 1.5 (ref 1.1–2.2)
ALBUMIN SERPL-MCNC: 4.4 G/DL (ref 3.4–5)
ALP BLD-CCNC: 61 U/L (ref 40–129)
ALT SERPL-CCNC: 16 U/L (ref 10–40)
ANION GAP SERPL CALCULATED.3IONS-SCNC: 18 MMOL/L (ref 3–16)
AST SERPL-CCNC: 14 U/L (ref 15–37)
BASOPHILS ABSOLUTE: 0 K/UL (ref 0–0.2)
BASOPHILS RELATIVE PERCENT: 0.6 %
BILIRUB SERPL-MCNC: 0.3 MG/DL (ref 0–1)
BUN BLDV-MCNC: 31 MG/DL (ref 7–20)
CALCIUM SERPL-MCNC: 10.8 MG/DL (ref 8.3–10.6)
CHLORIDE BLD-SCNC: 106 MMOL/L (ref 99–110)
CHOLESTEROL, TOTAL: 146 MG/DL (ref 0–199)
CO2: 25 MMOL/L (ref 21–32)
CREAT SERPL-MCNC: 2.1 MG/DL (ref 0.8–1.3)
CREATININE URINE: 109.8 MG/DL (ref 39–259)
EOSINOPHILS ABSOLUTE: 0.3 K/UL (ref 0–0.6)
EOSINOPHILS RELATIVE PERCENT: 4.6 %
GFR AFRICAN AMERICAN: 37
GFR NON-AFRICAN AMERICAN: 30
GLOBULIN: 2.9 G/DL
GLUCOSE BLD-MCNC: 130 MG/DL (ref 70–99)
HCT VFR BLD CALC: 43.4 % (ref 40.5–52.5)
HDLC SERPL-MCNC: 43 MG/DL (ref 40–60)
HEMOGLOBIN: 14.2 G/DL (ref 13.5–17.5)
LDL CHOLESTEROL CALCULATED: 77 MG/DL
LYMPHOCYTES ABSOLUTE: 2.1 K/UL (ref 1–5.1)
LYMPHOCYTES RELATIVE PERCENT: 30.5 %
MCH RBC QN AUTO: 29.8 PG (ref 26–34)
MCHC RBC AUTO-ENTMCNC: 32.8 G/DL (ref 31–36)
MCV RBC AUTO: 91 FL (ref 80–100)
MICROALBUMIN UR-MCNC: 18.1 MG/DL
MICROALBUMIN/CREAT UR-RTO: 164.8 MG/G (ref 0–30)
MONOCYTES ABSOLUTE: 0.7 K/UL (ref 0–1.3)
MONOCYTES RELATIVE PERCENT: 10.1 %
NEUTROPHILS ABSOLUTE: 3.7 K/UL (ref 1.7–7.7)
NEUTROPHILS RELATIVE PERCENT: 54.2 %
PDW BLD-RTO: 13.7 % (ref 12.4–15.4)
PLATELET # BLD: 258 K/UL (ref 135–450)
PMV BLD AUTO: 9.2 FL (ref 5–10.5)
POTASSIUM SERPL-SCNC: 4.7 MMOL/L (ref 3.5–5.1)
RBC # BLD: 4.77 M/UL (ref 4.2–5.9)
SODIUM BLD-SCNC: 149 MMOL/L (ref 136–145)
TOTAL PROTEIN: 7.3 G/DL (ref 6.4–8.2)
TRIGL SERPL-MCNC: 131 MG/DL (ref 0–150)
VITAMIN D 25-HYDROXY: 66.9 NG/ML
VLDLC SERPL CALC-MCNC: 26 MG/DL
WBC # BLD: 6.8 K/UL (ref 4–11)

## 2019-05-07 LAB
ESTIMATED AVERAGE GLUCOSE: 162.8 MG/DL
HBA1C MFR BLD: 7.3 %

## 2019-05-08 DIAGNOSIS — R79.89 CREATININE ELEVATION: Primary | ICD-10-CM

## 2019-05-14 ENCOUNTER — HOSPITAL ENCOUNTER (OUTPATIENT)
Dept: ULTRASOUND IMAGING | Age: 79
Discharge: HOME | End: 2019-05-14
Payer: MEDICARE

## 2019-05-14 DIAGNOSIS — R79.89 CREATININE ELEVATION: ICD-10-CM

## 2019-05-14 PROCEDURE — 76770 US EXAM ABDO BACK WALL COMP: CPT

## 2019-07-29 ENCOUNTER — OFFICE VISIT (OUTPATIENT)
Dept: FAMILY MEDICINE CLINIC | Age: 79
End: 2019-07-29
Payer: MEDICARE

## 2019-07-29 VITALS
HEIGHT: 68 IN | HEART RATE: 101 BPM | SYSTOLIC BLOOD PRESSURE: 122 MMHG | BODY MASS INDEX: 28.85 KG/M2 | DIASTOLIC BLOOD PRESSURE: 70 MMHG | OXYGEN SATURATION: 94 % | WEIGHT: 190.4 LBS

## 2019-07-29 DIAGNOSIS — N18.30 CHRONIC RENAL IMPAIRMENT, STAGE 3 (MODERATE) (HCC): ICD-10-CM

## 2019-07-29 DIAGNOSIS — M54.16 LUMBAR RADICULOPATHY, RIGHT: ICD-10-CM

## 2019-07-29 DIAGNOSIS — Z01.818 PRE-OP EXAM: Primary | ICD-10-CM

## 2019-07-29 DIAGNOSIS — N18.30 CHRONIC KIDNEY DISEASE, STAGE III (MODERATE) (HCC): ICD-10-CM

## 2019-07-29 DIAGNOSIS — I10 ESSENTIAL HYPERTENSION: ICD-10-CM

## 2019-07-29 DIAGNOSIS — I25.10 CORONARY ARTERY DISEASE INVOLVING NATIVE HEART WITHOUT ANGINA PECTORIS, UNSPECIFIED VESSEL OR LESION TYPE: ICD-10-CM

## 2019-07-29 PROCEDURE — 1123F ACP DISCUSS/DSCN MKR DOCD: CPT | Performed by: FAMILY MEDICINE

## 2019-07-29 PROCEDURE — G8598 ASA/ANTIPLAT THER USED: HCPCS | Performed by: FAMILY MEDICINE

## 2019-07-29 PROCEDURE — 4040F PNEUMOC VAC/ADMIN/RCVD: CPT | Performed by: FAMILY MEDICINE

## 2019-07-29 PROCEDURE — 99214 OFFICE O/P EST MOD 30 MIN: CPT | Performed by: FAMILY MEDICINE

## 2019-07-29 PROCEDURE — G8417 CALC BMI ABV UP PARAM F/U: HCPCS | Performed by: FAMILY MEDICINE

## 2019-07-29 PROCEDURE — G8427 DOCREV CUR MEDS BY ELIG CLIN: HCPCS | Performed by: FAMILY MEDICINE

## 2019-07-29 PROCEDURE — 93000 ELECTROCARDIOGRAM COMPLETE: CPT | Performed by: FAMILY MEDICINE

## 2019-07-29 PROCEDURE — 1036F TOBACCO NON-USER: CPT | Performed by: FAMILY MEDICINE

## 2019-07-29 NOTE — PROGRESS NOTES
insecurity:     Worry: None     Inability: None    Transportation needs:     Medical: None     Non-medical: None   Tobacco Use    Smoking status: Former Smoker     Last attempt to quit: 2006     Years since quittin.5    Smokeless tobacco: Never Used   Substance and Sexual Activity    Alcohol use: Yes     Alcohol/week: 2.0 standard drinks     Types: 2 Cans of beer per week    Drug use: No    Sexual activity: Yes     Partners: Female   Lifestyle    Physical activity:     Days per week: None     Minutes per session: None    Stress: None   Relationships    Social connections:     Talks on phone: None     Gets together: None     Attends Lutheran service: None     Active member of club or organization: None     Attends meetings of clubs or organizations: None     Relationship status: None    Intimate partner violence:     Fear of current or ex partner: None     Emotionally abused: None     Physically abused: None     Forced sexual activity: None   Other Topics Concern    None   Social History Narrative    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  100 each 3    Blood Glucose Monitoring Suppl (TRUE METRIX AIR GLUCOSE METER) GISSELLE Check  Device 3    TRUEPLUS LANCETS 33G MISC 100 each by Does not apply route 2 times daily (with meals) 100 each 5    Blood Glucose Calibration (TRUE METRIX LEVEL 2) Normal SOLN Check weekly 6 each 3   

## 2019-08-01 ENCOUNTER — TELEPHONE (OUTPATIENT)
Dept: FAMILY MEDICINE CLINIC | Age: 79
End: 2019-08-01

## 2019-08-02 DIAGNOSIS — N18.30 CHRONIC RENAL IMPAIRMENT, STAGE 3 (MODERATE) (HCC): Primary | ICD-10-CM

## 2019-08-07 ENCOUNTER — TELEPHONE (OUTPATIENT)
Dept: FAMILY MEDICINE CLINIC | Age: 79
End: 2019-08-07

## 2019-08-26 ENCOUNTER — TELEPHONE (OUTPATIENT)
Dept: FAMILY MEDICINE CLINIC | Age: 79
End: 2019-08-26

## 2019-08-28 PROBLEM — Z01.818 PRE-OP EXAM: Status: RESOLVED | Noted: 2019-07-29 | Resolved: 2019-08-28

## 2019-09-06 ENCOUNTER — TELEPHONE (OUTPATIENT)
Dept: FAMILY MEDICINE CLINIC | Age: 79
End: 2019-09-06

## 2019-10-21 ENCOUNTER — TELEPHONE (OUTPATIENT)
Dept: FAMILY MEDICINE CLINIC | Age: 79
End: 2019-10-21

## 2019-10-23 ENCOUNTER — OFFICE VISIT (OUTPATIENT)
Dept: FAMILY MEDICINE CLINIC | Age: 79
End: 2019-10-23
Payer: MEDICARE

## 2019-10-23 VITALS
OXYGEN SATURATION: 96 % | SYSTOLIC BLOOD PRESSURE: 122 MMHG | BODY MASS INDEX: 25.55 KG/M2 | WEIGHT: 165.6 LBS | HEART RATE: 76 BPM | DIASTOLIC BLOOD PRESSURE: 80 MMHG

## 2019-10-23 DIAGNOSIS — E87.6 HYPOKALEMIA: ICD-10-CM

## 2019-10-23 DIAGNOSIS — E11.8 DIABETES MELLITUS TYPE 2 WITH COMPLICATIONS (HCC): Primary | ICD-10-CM

## 2019-10-23 DIAGNOSIS — I10 ESSENTIAL HYPERTENSION: ICD-10-CM

## 2019-10-23 DIAGNOSIS — R35.0 URINARY FREQUENCY: ICD-10-CM

## 2019-10-23 DIAGNOSIS — N17.9 ACUTE RENAL FAILURE SUPERIMPOSED ON STAGE 3 CHRONIC KIDNEY DISEASE, UNSPECIFIED ACUTE RENAL FAILURE TYPE: ICD-10-CM

## 2019-10-23 DIAGNOSIS — C34.10 MALIGNANT NEOPLASM OF UPPER LOBE OF LUNG, UNSPECIFIED LATERALITY (HCC): ICD-10-CM

## 2019-10-23 DIAGNOSIS — N18.3 ACUTE RENAL FAILURE SUPERIMPOSED ON STAGE 3 CHRONIC KIDNEY DISEASE, UNSPECIFIED ACUTE RENAL FAILURE TYPE: ICD-10-CM

## 2019-10-23 PROBLEM — M48.061 SPINAL STENOSIS OF LUMBAR REGION: Status: RESOLVED | Noted: 2019-07-29 | Resolved: 2019-10-23

## 2019-10-23 PROBLEM — N18.30 ACUTE RENAL FAILURE SUPERIMPOSED ON STAGE 3 CHRONIC KIDNEY DISEASE (HCC): Status: ACTIVE | Noted: 2019-10-23

## 2019-10-23 PROBLEM — F43.23 ADJUSTMENT DISORDER WITH MIXED ANXIETY AND DEPRESSED MOOD: Status: ACTIVE | Noted: 2019-10-17

## 2019-10-23 PROBLEM — M48.061 SPINAL STENOSIS OF LUMBAR REGION: Status: ACTIVE | Noted: 2019-07-29

## 2019-10-23 PROBLEM — N18.9 CHRONIC KIDNEY DISEASE: Status: ACTIVE | Noted: 2019-07-29

## 2019-10-23 PROBLEM — N40.0 BPH (BENIGN PROSTATIC HYPERPLASIA): Status: ACTIVE | Noted: 2019-09-29

## 2019-10-23 PROCEDURE — 1123F ACP DISCUSS/DSCN MKR DOCD: CPT | Performed by: NURSE PRACTITIONER

## 2019-10-23 PROCEDURE — 1036F TOBACCO NON-USER: CPT | Performed by: NURSE PRACTITIONER

## 2019-10-23 PROCEDURE — 4040F PNEUMOC VAC/ADMIN/RCVD: CPT | Performed by: NURSE PRACTITIONER

## 2019-10-23 PROCEDURE — G8427 DOCREV CUR MEDS BY ELIG CLIN: HCPCS | Performed by: NURSE PRACTITIONER

## 2019-10-23 PROCEDURE — 1111F DSCHRG MED/CURRENT MED MERGE: CPT | Performed by: NURSE PRACTITIONER

## 2019-10-23 PROCEDURE — G8482 FLU IMMUNIZE ORDER/ADMIN: HCPCS | Performed by: NURSE PRACTITIONER

## 2019-10-23 PROCEDURE — 99215 OFFICE O/P EST HI 40 MIN: CPT | Performed by: NURSE PRACTITIONER

## 2019-10-23 PROCEDURE — G8417 CALC BMI ABV UP PARAM F/U: HCPCS | Performed by: NURSE PRACTITIONER

## 2019-10-23 PROCEDURE — G8598 ASA/ANTIPLAT THER USED: HCPCS | Performed by: NURSE PRACTITIONER

## 2019-10-23 RX ORDER — TAMSULOSIN HYDROCHLORIDE 0.4 MG/1
0.4 CAPSULE ORAL DAILY
COMMUNITY
End: 2019-10-23 | Stop reason: SDUPTHER

## 2019-10-23 RX ORDER — HYDRALAZINE HYDROCHLORIDE 25 MG/1
25 TABLET, FILM COATED ORAL 3 TIMES DAILY
COMMUNITY
End: 2019-10-23 | Stop reason: SDUPTHER

## 2019-10-23 RX ORDER — ATORVASTATIN CALCIUM 20 MG/1
20 TABLET, FILM COATED ORAL DAILY
Qty: 90 TABLET | Refills: 0 | Status: SHIPPED | OUTPATIENT
Start: 2019-10-23 | End: 2020-03-17 | Stop reason: SDUPTHER

## 2019-10-23 RX ORDER — ATORVASTATIN CALCIUM 20 MG/1
20 TABLET, FILM COATED ORAL DAILY
COMMUNITY
End: 2019-10-23 | Stop reason: SDUPTHER

## 2019-10-23 RX ORDER — AMLODIPINE BESYLATE 10 MG/1
TABLET ORAL
Qty: 90 TABLET | Refills: 0 | Status: SHIPPED | OUTPATIENT
Start: 2019-10-23 | End: 2020-07-15 | Stop reason: ALTCHOICE

## 2019-10-23 RX ORDER — HYDRALAZINE HYDROCHLORIDE 25 MG/1
25 TABLET, FILM COATED ORAL 3 TIMES DAILY
Qty: 90 TABLET | Refills: 0 | Status: SHIPPED | OUTPATIENT
Start: 2019-10-23 | End: 2019-12-26 | Stop reason: SDUPTHER

## 2019-10-23 RX ORDER — POTASSIUM CHLORIDE 1500 MG/1
20 TABLET, FILM COATED, EXTENDED RELEASE ORAL
Qty: 90 TABLET | Refills: 0 | Status: SHIPPED | OUTPATIENT
Start: 2019-10-23 | End: 2020-07-15

## 2019-10-23 RX ORDER — POTASSIUM CHLORIDE 1500 MG/1
20 TABLET, FILM COATED, EXTENDED RELEASE ORAL
COMMUNITY
End: 2019-10-23 | Stop reason: SDUPTHER

## 2019-10-23 RX ORDER — ASPIRIN 81 MG/1
81 TABLET ORAL DAILY
Qty: 90 TABLET | Refills: 1 | Status: SHIPPED | OUTPATIENT
Start: 2019-10-23

## 2019-10-23 RX ORDER — TAMSULOSIN HYDROCHLORIDE 0.4 MG/1
0.4 CAPSULE ORAL DAILY
Qty: 90 CAPSULE | Refills: 0 | Status: SHIPPED | OUTPATIENT
Start: 2019-10-23 | End: 2020-03-17 | Stop reason: SDUPTHER

## 2019-10-23 ASSESSMENT — ENCOUNTER SYMPTOMS
ALLERGIC/IMMUNOLOGIC NEGATIVE: 1
GASTROINTESTINAL NEGATIVE: 1
RESPIRATORY NEGATIVE: 1
EYES NEGATIVE: 1

## 2019-11-13 ENCOUNTER — TELEPHONE (OUTPATIENT)
Dept: FAMILY MEDICINE CLINIC | Age: 79
End: 2019-11-13

## 2019-11-13 DIAGNOSIS — R41.82 ALTERED MENTAL STATUS, UNSPECIFIED ALTERED MENTAL STATUS TYPE: Primary | ICD-10-CM

## 2019-11-14 DIAGNOSIS — R41.82 ALTERED MENTAL STATUS, UNSPECIFIED ALTERED MENTAL STATUS TYPE: Primary | ICD-10-CM

## 2019-11-15 ENCOUNTER — OFFICE VISIT (OUTPATIENT)
Dept: FAMILY MEDICINE CLINIC | Age: 79
End: 2019-11-15
Payer: MEDICARE

## 2019-11-15 ENCOUNTER — TELEPHONE (OUTPATIENT)
Dept: FAMILY MEDICINE CLINIC | Age: 79
End: 2019-11-15

## 2019-11-15 VITALS
WEIGHT: 170.4 LBS | OXYGEN SATURATION: 98 % | SYSTOLIC BLOOD PRESSURE: 170 MMHG | HEART RATE: 101 BPM | DIASTOLIC BLOOD PRESSURE: 80 MMHG | BODY MASS INDEX: 25.82 KG/M2 | HEIGHT: 68 IN

## 2019-11-15 DIAGNOSIS — R31.9 URINARY TRACT INFECTION WITH HEMATURIA, SITE UNSPECIFIED: Primary | ICD-10-CM

## 2019-11-15 DIAGNOSIS — N39.0 URINARY TRACT INFECTION WITH HEMATURIA, SITE UNSPECIFIED: Primary | ICD-10-CM
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
## 2019-11-15 DIAGNOSIS — E11.8 DIABETES MELLITUS TYPE 2 WITH COMPLICATIONS (HCC): ICD-10-CM

## 2019-11-15 DIAGNOSIS — R53.83 OTHER FATIGUE: ICD-10-CM
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
## 2019-11-15 LAB
A/G RATIO: 1.7 (ref 1.1–2.2)
ALBUMIN SERPL-MCNC: 4.2 G/DL (ref 3.4–5)
ALP BLD-CCNC: 89 U/L (ref 40–129)
ALT SERPL-CCNC: 14 U/L (ref 10–40)
ANION GAP SERPL CALCULATED.3IONS-SCNC: 15 MMOL/L (ref 3–16)
AST SERPL-CCNC: 17 U/L (ref 15–37)
BASOPHILS ABSOLUTE: 0.1 K/UL (ref 0–0.2)
BASOPHILS RELATIVE PERCENT: 0.7 %
BILIRUB SERPL-MCNC: 0.3 MG/DL (ref 0–1)
BUN BLDV-MCNC: 27 MG/DL (ref 7–20)
CALCIUM SERPL-MCNC: 10.3 MG/DL (ref 8.3–10.6)
CHLORIDE BLD-SCNC: 105 MMOL/L (ref 99–110)
CO2: 26 MMOL/L (ref 21–32)
CREAT SERPL-MCNC: 2.8 MG/DL (ref 0.8–1.3)
EOSINOPHILS ABSOLUTE: 0.1 K/UL (ref 0–0.6)
EOSINOPHILS RELATIVE PERCENT: 1.3 %
GFR AFRICAN AMERICAN: 26
GFR NON-AFRICAN AMERICAN: 22
GLOBULIN: 2.5 G/DL
GLUCOSE BLD-MCNC: 174 MG/DL (ref 70–99)
HCT VFR BLD CALC: 35.8 % (ref 40.5–52.5)
HEMOGLOBIN: 11.6 G/DL (ref 13.5–17.5)
LYMPHOCYTES ABSOLUTE: 2.2 K/UL (ref 1–5.1)
LYMPHOCYTES RELATIVE PERCENT: 22.9 %
MCH RBC QN AUTO: 28 PG (ref 26–34)
MCHC RBC AUTO-ENTMCNC: 32.5 G/DL (ref 31–36)
MCV RBC AUTO: 86.3 FL (ref 80–100)
MONOCYTES ABSOLUTE: 0.9 K/UL (ref 0–1.3)
MONOCYTES RELATIVE PERCENT: 9.7 %
NEUTROPHILS ABSOLUTE: 6.2 K/UL (ref 1.7–7.7)
NEUTROPHILS RELATIVE PERCENT: 65.4 %
PDW BLD-RTO: 15.4 % (ref 12.4–15.4)
PLATELET # BLD: 342 K/UL (ref 135–450)
PMV BLD AUTO: 8.7 FL (ref 5–10.5)
POTASSIUM SERPL-SCNC: 5 MMOL/L (ref 3.5–5.1)
RBC # BLD: 4.15 M/UL (ref 4.2–5.9)
SODIUM BLD-SCNC: 146 MMOL/L (ref 136–145)
TOTAL PROTEIN: 6.7 G/DL (ref 6.4–8.2)
WBC # BLD: 9.5 K/UL (ref 4–11)

## 2019-11-15 PROCEDURE — 1123F ACP DISCUSS/DSCN MKR DOCD: CPT | Performed by: FAMILY MEDICINE

## 2019-11-15 PROCEDURE — G8598 ASA/ANTIPLAT THER USED: HCPCS | Performed by: FAMILY MEDICINE

## 2019-11-15 PROCEDURE — G8427 DOCREV CUR MEDS BY ELIG CLIN: HCPCS | Performed by: FAMILY MEDICINE

## 2019-11-15 PROCEDURE — 99214 OFFICE O/P EST MOD 30 MIN: CPT | Performed by: FAMILY MEDICINE

## 2019-11-15 PROCEDURE — G8482 FLU IMMUNIZE ORDER/ADMIN: HCPCS | Performed by: FAMILY MEDICINE

## 2019-11-15 PROCEDURE — 81000 URINALYSIS NONAUTO W/SCOPE: CPT | Performed by: FAMILY MEDICINE

## 2019-11-15 PROCEDURE — 4040F PNEUMOC VAC/ADMIN/RCVD: CPT | Performed by: FAMILY MEDICINE

## 2019-11-15 PROCEDURE — 1036F TOBACCO NON-USER: CPT | Performed by: FAMILY MEDICINE

## 2019-11-15 PROCEDURE — G8417 CALC BMI ABV UP PARAM F/U: HCPCS | Performed by: FAMILY MEDICINE

## 2019-11-15 ASSESSMENT — ENCOUNTER SYMPTOMS
VOMITING: 0
COUGH: 0
VISUAL CHANGE: 0
SORE THROAT: 0
ABDOMINAL PAIN: 0
SWOLLEN GLANDS: 0
CHANGE IN BOWEL HABIT: 0
BOWEL INCONTINENCE: 0
NAUSEA: 0

## 2019-11-15 ASSESSMENT — PATIENT HEALTH QUESTIONNAIRE - PHQ9
SUM OF ALL RESPONSES TO PHQ QUESTIONS 1-9: 0
SUM OF ALL RESPONSES TO PHQ QUESTIONS 1-9: 0
2. FEELING DOWN, DEPRESSED OR HOPELESS: 0
SUM OF ALL RESPONSES TO PHQ9 QUESTIONS 1 & 2: 0
1. LITTLE INTEREST OR PLEASURE IN DOING THINGS: 0

## 2019-11-16 LAB
ESTIMATED AVERAGE GLUCOSE: 139.9 MG/DL
HBA1C MFR BLD: 6.5 %

## 2019-11-17 LAB — URINE CULTURE, ROUTINE: NORMAL

## 2019-11-18 ENCOUNTER — TELEPHONE (OUTPATIENT)
Dept: FAMILY MEDICINE CLINIC | Age: 79
End: 2019-11-18

## 2019-11-25 ENCOUNTER — TELEPHONE (OUTPATIENT)
Dept: FAMILY MEDICINE CLINIC | Age: 79
End: 2019-11-25

## 2019-12-10 ENCOUNTER — TELEPHONE (OUTPATIENT)
Dept: FAMILY MEDICINE CLINIC | Age: 79
End: 2019-12-10

## 2019-12-11 ENCOUNTER — TELEPHONE (OUTPATIENT)
Dept: FAMILY MEDICINE CLINIC | Age: 79
End: 2019-12-11

## 2019-12-12 ENCOUNTER — TELEPHONE (OUTPATIENT)
Dept: FAMILY MEDICINE CLINIC | Age: 79
End: 2019-12-12

## 2019-12-13 ENCOUNTER — OFFICE VISIT (OUTPATIENT)
Dept: FAMILY MEDICINE CLINIC | Age: 79
End: 2019-12-13
Payer: MEDICARE

## 2019-12-13 VITALS
BODY MASS INDEX: 25 KG/M2 | OXYGEN SATURATION: 86 % | DIASTOLIC BLOOD PRESSURE: 62 MMHG | HEART RATE: 97 BPM | WEIGHT: 162 LBS | SYSTOLIC BLOOD PRESSURE: 134 MMHG

## 2019-12-13 DIAGNOSIS — N18.30 CHRONIC RENAL INSUFFICIENCY, STAGE 3 (MODERATE) (HCC): ICD-10-CM

## 2019-12-13 DIAGNOSIS — I10 ESSENTIAL HYPERTENSION: ICD-10-CM

## 2019-12-13 DIAGNOSIS — E11.8 DIABETES MELLITUS TYPE 2 WITH COMPLICATIONS (HCC): ICD-10-CM

## 2019-12-13 DIAGNOSIS — C34.10 MALIGNANT NEOPLASM OF UPPER LOBE OF LUNG, UNSPECIFIED LATERALITY (HCC): ICD-10-CM

## 2019-12-13 PROBLEM — N17.9 ACUTE RENAL FAILURE SUPERIMPOSED ON STAGE 3 CHRONIC KIDNEY DISEASE (HCC): Status: RESOLVED | Noted: 2019-10-23 | Resolved: 2019-12-13

## 2019-12-13 PROCEDURE — 1036F TOBACCO NON-USER: CPT | Performed by: FAMILY MEDICINE

## 2019-12-13 PROCEDURE — G8427 DOCREV CUR MEDS BY ELIG CLIN: HCPCS | Performed by: FAMILY MEDICINE

## 2019-12-13 PROCEDURE — 99214 OFFICE O/P EST MOD 30 MIN: CPT | Performed by: FAMILY MEDICINE

## 2019-12-13 PROCEDURE — G8417 CALC BMI ABV UP PARAM F/U: HCPCS | Performed by: FAMILY MEDICINE

## 2019-12-13 PROCEDURE — 1111F DSCHRG MED/CURRENT MED MERGE: CPT | Performed by: FAMILY MEDICINE

## 2019-12-13 PROCEDURE — G8598 ASA/ANTIPLAT THER USED: HCPCS | Performed by: FAMILY MEDICINE

## 2019-12-13 PROCEDURE — 1123F ACP DISCUSS/DSCN MKR DOCD: CPT | Performed by: FAMILY MEDICINE

## 2019-12-13 PROCEDURE — G8482 FLU IMMUNIZE ORDER/ADMIN: HCPCS | Performed by: FAMILY MEDICINE

## 2019-12-13 PROCEDURE — 4040F PNEUMOC VAC/ADMIN/RCVD: CPT | Performed by: FAMILY MEDICINE

## 2019-12-13 RX ORDER — NIFEDIPINE 30 MG/1
60 TABLET, FILM COATED, EXTENDED RELEASE ORAL DAILY
COMMUNITY
End: 2019-12-20 | Stop reason: SDUPTHER

## 2019-12-13 RX ORDER — SENNA AND DOCUSATE SODIUM 50; 8.6 MG/1; MG/1
1 TABLET, FILM COATED ORAL DAILY
COMMUNITY

## 2019-12-13 RX ORDER — METOPROLOL SUCCINATE AND HYDROCHLOROTHIAZIDE 12.5; 5 MG/1; MG/1
50 TABLET ORAL 2 TIMES DAILY
COMMUNITY
End: 2019-12-20 | Stop reason: SDUPTHER

## 2019-12-13 RX ORDER — GLIPIZIDE 5 MG/1
5 TABLET ORAL DAILY
COMMUNITY
End: 2019-12-20 | Stop reason: SDUPTHER

## 2019-12-20 ENCOUNTER — TELEPHONE (OUTPATIENT)
Dept: FAMILY MEDICINE CLINIC | Age: 79
End: 2019-12-20

## 2019-12-20 RX ORDER — GLIPIZIDE 5 MG/1
5 TABLET ORAL DAILY
Qty: 90 TABLET | Refills: 3 | Status: SHIPPED | OUTPATIENT
Start: 2019-12-20

## 2019-12-20 RX ORDER — NIFEDIPINE 60 MG/1
60 TABLET, FILM COATED, EXTENDED RELEASE ORAL DAILY
Qty: 90 TABLET | Refills: 3 | Status: SHIPPED | OUTPATIENT
Start: 2019-12-20 | End: 2020-01-06 | Stop reason: SDUPTHER

## 2019-12-20 RX ORDER — METOPROLOL SUCCINATE AND HYDROCHLOROTHIAZIDE 12.5; 5 MG/1; MG/1
50 TABLET ORAL 2 TIMES DAILY
Qty: 90 TABLET | Refills: 3 | Status: SHIPPED | OUTPATIENT
Start: 2019-12-20 | End: 2020-01-06

## 2019-12-26 ENCOUNTER — TELEPHONE (OUTPATIENT)
Dept: FAMILY MEDICINE CLINIC | Age: 79
End: 2019-12-26

## 2019-12-26 RX ORDER — HYDRALAZINE HYDROCHLORIDE 25 MG/1
25 TABLET, FILM COATED ORAL 3 TIMES DAILY
Qty: 90 TABLET | Refills: 5 | Status: SHIPPED | OUTPATIENT
Start: 2019-12-26 | End: 2020-01-14 | Stop reason: SDUPTHER

## 2019-12-30 ENCOUNTER — TELEPHONE (OUTPATIENT)
Dept: FAMILY MEDICINE CLINIC | Age: 79
End: 2019-12-30

## 2020-01-06 ENCOUNTER — OFFICE VISIT (OUTPATIENT)
Dept: FAMILY MEDICINE CLINIC | Age: 80
End: 2020-01-06
Payer: MEDICARE

## 2020-01-06 ENCOUNTER — TELEPHONE (OUTPATIENT)
Dept: FAMILY MEDICINE CLINIC | Age: 80
End: 2020-01-06

## 2020-01-06 VITALS
WEIGHT: 167.2 LBS | HEIGHT: 68 IN | DIASTOLIC BLOOD PRESSURE: 60 MMHG | BODY MASS INDEX: 25.34 KG/M2 | OXYGEN SATURATION: 95 % | HEART RATE: 74 BPM | SYSTOLIC BLOOD PRESSURE: 110 MMHG

## 2020-01-06 PROCEDURE — 99214 OFFICE O/P EST MOD 30 MIN: CPT | Performed by: FAMILY MEDICINE

## 2020-01-06 PROCEDURE — 1123F ACP DISCUSS/DSCN MKR DOCD: CPT | Performed by: FAMILY MEDICINE

## 2020-01-06 PROCEDURE — G8482 FLU IMMUNIZE ORDER/ADMIN: HCPCS | Performed by: FAMILY MEDICINE

## 2020-01-06 PROCEDURE — G8427 DOCREV CUR MEDS BY ELIG CLIN: HCPCS | Performed by: FAMILY MEDICINE

## 2020-01-06 PROCEDURE — G8417 CALC BMI ABV UP PARAM F/U: HCPCS | Performed by: FAMILY MEDICINE

## 2020-01-06 PROCEDURE — 1036F TOBACCO NON-USER: CPT | Performed by: FAMILY MEDICINE

## 2020-01-06 PROCEDURE — 4040F PNEUMOC VAC/ADMIN/RCVD: CPT | Performed by: FAMILY MEDICINE

## 2020-01-06 RX ORDER — NIFEDIPINE 60 MG/1
60 TABLET, FILM COATED, EXTENDED RELEASE ORAL DAILY
Qty: 30 TABLET | Refills: 3 | Status: SHIPPED | OUTPATIENT
Start: 2020-01-06 | End: 2020-01-06 | Stop reason: SDUPTHER

## 2020-01-06 RX ORDER — NIFEDIPINE 60 MG/1
60 TABLET, FILM COATED, EXTENDED RELEASE ORAL DAILY
Qty: 90 TABLET | Refills: 3 | Status: SHIPPED | OUTPATIENT
Start: 2020-01-06

## 2020-01-06 RX ORDER — METOPROLOL TARTRATE 50 MG/1
50 TABLET, FILM COATED ORAL 2 TIMES DAILY
COMMUNITY
End: 2020-01-14 | Stop reason: SDUPTHER

## 2020-01-06 ASSESSMENT — ENCOUNTER SYMPTOMS
SINUS PRESSURE: 0
CHEST TIGHTNESS: 0
BLOOD IN STOOL: 0
COUGH: 0
CHOKING: 0
EYE ITCHING: 0
WHEEZING: 0
EYE DISCHARGE: 0
EYE PAIN: 0
COLOR CHANGE: 0
RHINORRHEA: 0
ANAL BLEEDING: 0
ABDOMINAL PAIN: 0
SHORTNESS OF BREATH: 0
BACK PAIN: 0
ABDOMINAL DISTENTION: 0
EYE REDNESS: 0
FACIAL SWELLING: 0
APNEA: 0
PHOTOPHOBIA: 0
STRIDOR: 0

## 2020-01-06 ASSESSMENT — PATIENT HEALTH QUESTIONNAIRE - PHQ9
1. LITTLE INTEREST OR PLEASURE IN DOING THINGS: 0
2. FEELING DOWN, DEPRESSED OR HOPELESS: 0
SUM OF ALL RESPONSES TO PHQ QUESTIONS 1-9: 0
SUM OF ALL RESPONSES TO PHQ9 QUESTIONS 1 & 2: 0
SUM OF ALL RESPONSES TO PHQ QUESTIONS 1-9: 0

## 2020-01-06 NOTE — PROGRESS NOTES
Subjective:     Patient ID:Hoang Miranda is a 78 y.o. male. HPI   Treatment Adherence:   Medication compliance:  compliant most of the time  Diet compliance:  compliant most of the time  Weight trend: stable  Current exercise: no regular exercise  Barriers: none    Diabetes Mellitus Type 2: Current symptoms/problems include none. Home blood sugar records: fasting range: 125  Any episodes of hypoglycemia? no  Daily Aspirin? Yes    Hypertension:  Home blood pressure monitoring: No.  He is adherent to a low sodium diet. Patient denies chest pain, shortness of breath, headache, lightheadedness, blurred vision, peripheral edema, palpitations, dry cough and fatigue. Antihypertensive medication side effects: no medication side effects noted. Use of agents associated with hypertension: none. Hyperlipidemia:  No new myalgias or GI upset on atorvastatin (Lipitor). No Known Allergies    Current Outpatient Medications   Medication Sig Dispense Refill    NIFEdipine (ADALAT CC) 60 MG extended release tablet Take 1 tablet by mouth daily 90 tablet 3    metoprolol tartrate (LOPRESSOR) 50 MG tablet Take 50 mg by mouth 2 times daily      hydrALAZINE (APRESOLINE) 25 MG tablet Take 1 tablet by mouth 3 times daily 90 tablet 5    glipiZIDE (GLUCOTROL) 5 MG tablet Take 1 tablet by mouth daily 90 tablet 3    sennosides-docusate sodium (SENOKOT-S) 8.6-50 MG tablet Take 1 tablet by mouth daily q 12h      CRANBERRY CONCENTRATE PO Take by mouth      tamsulosin (FLOMAX) 0.4 MG capsule Take 1 capsule by mouth daily 90 capsule 0    atorvastatin (LIPITOR) 20 MG tablet Take 1 tablet by mouth daily 90 tablet 0    aspirin EC 81 MG EC tablet Take 1 tablet by mouth daily 90 tablet 1    nitroGLYCERIN (NITROSTAT) 0.4 MG SL tablet Place 1 tablet under the tongue every 5 minutes as needed 25 tablet 3    Ascorbic Acid (VITAMIN C) 500 MG tablet Take 500 mg by mouth daily.         metFORMIN (GLUCOPHAGE) 1000 MG tablet Take 0.5 Minutes per session: Not on file    Stress: Not on file   Relationships    Social connections:     Talks on phone: Not on file     Gets together: Not on file     Attends Yazidism service: Not on file     Active member of club or organization: Not on file     Attends meetings of clubs or organizations: Not on file     Relationship status: Not on file    Intimate partner violence:     Fear of current or ex partner: Not on file     Emotionally abused: Not on file     Physically abused: Not on file     Forced sexual activity: Not on file   Other Topics Concern    Not on file   Social History Narrative    Retired [employer]; 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;PT job at [employer]    happily  3 kids here and 9 grandchildren    Hobbies--golf(2-3/w)    Exercise --walks    +seatbelts       Family History   Problem Relation Age of Onset    Heart Disease Mother     Emphysema Father     Heart Disease Father     High Cholesterol Sister        Immunization History   Administered Date(s) Administered    Influenza Vaccine, unspecified formulation 01/11/2017    Influenza Virus Vaccine 10/10/2011, 12/18/2012    Influenza, High Dose (Fluzone 65 yrs and older) 11/06/2015, 10/08/2019    Pneumococcal Conjugate 13-valent ([id number]) 11/06/2015    Pneumococcal Polysaccharide ([id number]) 01/10/2007, 10/21/2008    Td, unspecified formulation 01/10/2007    Tdap (Boostrix, Adacel) 04/13/2015    Zoster Live (Zostavax) 12/18/2012       Review of Systems  Review of Systems   Constitutional: Negative for activity change, appetite change, chills, diaphoresis, fatigue, fever and unexpected weight change. HENT: Negative for congestion, dental problem, drooling, ear discharge, ear pain, facial swelling, hearing loss, nosebleeds, postnasal drip, rhinorrhea, sinus pressure, sneezing and tinnitus. Eyes: Negative for photophobia, pain, discharge, redness, itching and visual disturbance.    Respiratory: Negative for apnea, cough, choking, chest mass.      Tenderness: There is no tenderness. Musculoskeletal:         General: No tenderness. Lymphadenopathy:      Cervical: No cervical adenopathy. Skin:     General: Skin is warm and dry. Coloration: Skin is not pale. Findings: No erythema or rash. Neurological:      Mental Status: He is alert and oriented to person, place, and time. Cranial Nerves: No cranial nerve deficit. Motor: No abnormal muscle tone. Coordination: Coordination normal.      Deep Tendon Reflexes: Reflexes normal.   Psychiatric:         Behavior: Behavior normal.         Thought Content:  Thought content normal.         Judgment: Judgment normal.         Assessment and Plan:     Diabetes mellitus type 2 with complications (HCC)  Stable--same plan    Hypercholesteremia  labs    Essential hypertension  Stable--TOS and SE discussed

## 2020-01-14 ENCOUNTER — TELEPHONE (OUTPATIENT)
Dept: FAMILY MEDICINE CLINIC | Age: 80
End: 2020-01-14

## 2020-01-14 RX ORDER — METOPROLOL TARTRATE 50 MG/1
50 TABLET, FILM COATED ORAL 2 TIMES DAILY
Status: CANCELLED | OUTPATIENT
Start: 2020-01-14

## 2020-01-14 RX ORDER — METOPROLOL TARTRATE 50 MG/1
50 TABLET, FILM COATED ORAL 2 TIMES DAILY
Qty: 180 TABLET | Refills: 2 | Status: SHIPPED | OUTPATIENT
Start: 2020-01-14 | End: 2020-07-15 | Stop reason: SDUPTHER

## 2020-01-14 RX ORDER — HYDRALAZINE HYDROCHLORIDE 25 MG/1
25 TABLET, FILM COATED ORAL 3 TIMES DAILY
Qty: 90 TABLET | Refills: 5 | Status: CANCELLED | OUTPATIENT
Start: 2020-01-14

## 2020-01-14 RX ORDER — HYDRALAZINE HYDROCHLORIDE 25 MG/1
25 TABLET, FILM COATED ORAL 3 TIMES DAILY
Qty: 270 TABLET | Refills: 5 | Status: SHIPPED | OUTPATIENT
Start: 2020-01-14 | End: 2020-01-17

## 2020-01-14 NOTE — TELEPHONE ENCOUNTER
Pt son is requesting a refill on the hydralazine and metoprolol. Pt son said that clarification is needed about dosage on both meds.  Pls contact [facility] at [address] contact Pt son Shannan Argueta to follow up [phone]

## 2020-01-16 ENCOUNTER — TELEPHONE (OUTPATIENT)
Dept: FAMILY MEDICINE CLINIC | Age: 80
End: 2020-01-16

## 2020-01-17 RX ORDER — HYDRALAZINE HYDROCHLORIDE 25 MG/1
50 TABLET, FILM COATED ORAL 3 TIMES DAILY
Qty: 540 TABLET | Refills: 5 | Status: SHIPPED | OUTPATIENT
Start: 2020-01-17 | End: 2020-01-27 | Stop reason: SDUPTHER

## 2020-01-27 ENCOUNTER — OFFICE VISIT (OUTPATIENT)
Dept: FAMILY MEDICINE CLINIC | Age: 80
End: 2020-01-27
Payer: MEDICARE

## 2020-01-27 VITALS
HEART RATE: 82 BPM | SYSTOLIC BLOOD PRESSURE: 130 MMHG | WEIGHT: 164.4 LBS | OXYGEN SATURATION: 94 % | HEIGHT: 68 IN | DIASTOLIC BLOOD PRESSURE: 70 MMHG | BODY MASS INDEX: 24.92 KG/M2

## 2020-01-27 DIAGNOSIS — I10 ESSENTIAL HYPERTENSION: ICD-10-CM

## 2020-01-27 LAB
ANION GAP SERPL CALCULATED.3IONS-SCNC: 17 MMOL/L (ref 3–16)
BASOPHILS ABSOLUTE: 0 K/UL (ref 0–0.2)
BASOPHILS RELATIVE PERCENT: 0.5 %
BUN BLDV-MCNC: 38 MG/DL (ref 7–20)
CALCIUM SERPL-MCNC: 9.8 MG/DL (ref 8.3–10.6)
CHLORIDE BLD-SCNC: 105 MMOL/L (ref 99–110)
CO2: 26 MMOL/L (ref 21–32)
CREAT SERPL-MCNC: 3.4 MG/DL (ref 0.8–1.3)
EOSINOPHILS ABSOLUTE: 0.5 K/UL (ref 0–0.6)
EOSINOPHILS RELATIVE PERCENT: 5 %
GFR AFRICAN AMERICAN: 21
GFR NON-AFRICAN AMERICAN: 17
GLUCOSE BLD-MCNC: 100 MG/DL (ref 70–99)
HCT VFR BLD CALC: 35.6 % (ref 40.5–52.5)
HEMOGLOBIN: 11.4 G/DL (ref 13.5–17.5)
LYMPHOCYTES ABSOLUTE: 1.7 K/UL (ref 1–5.1)
LYMPHOCYTES RELATIVE PERCENT: 18.8 %
MCH RBC QN AUTO: 27.4 PG (ref 26–34)
MCHC RBC AUTO-ENTMCNC: 32 G/DL (ref 31–36)
MCV RBC AUTO: 85.4 FL (ref 80–100)
MONOCYTES ABSOLUTE: 0.9 K/UL (ref 0–1.3)
MONOCYTES RELATIVE PERCENT: 9.7 %
NEUTROPHILS ABSOLUTE: 6 K/UL (ref 1.7–7.7)
NEUTROPHILS RELATIVE PERCENT: 66 %
PDW BLD-RTO: 17.1 % (ref 12.4–15.4)
PLATELET # BLD: 323 K/UL (ref 135–450)
PMV BLD AUTO: 8.2 FL (ref 5–10.5)
POTASSIUM SERPL-SCNC: 4.8 MMOL/L (ref 3.5–5.1)
RBC # BLD: 4.16 M/UL (ref 4.2–5.9)
SODIUM BLD-SCNC: 148 MMOL/L (ref 136–145)
WBC # BLD: 9.1 K/UL (ref 4–11)

## 2020-01-27 PROCEDURE — G8427 DOCREV CUR MEDS BY ELIG CLIN: HCPCS | Performed by: FAMILY MEDICINE

## 2020-01-27 PROCEDURE — 4040F PNEUMOC VAC/ADMIN/RCVD: CPT | Performed by: FAMILY MEDICINE

## 2020-01-27 PROCEDURE — G8417 CALC BMI ABV UP PARAM F/U: HCPCS | Performed by: FAMILY MEDICINE

## 2020-01-27 PROCEDURE — G8482 FLU IMMUNIZE ORDER/ADMIN: HCPCS | Performed by: FAMILY MEDICINE

## 2020-01-27 PROCEDURE — 1036F TOBACCO NON-USER: CPT | Performed by: FAMILY MEDICINE

## 2020-01-27 PROCEDURE — 1123F ACP DISCUSS/DSCN MKR DOCD: CPT | Performed by: FAMILY MEDICINE

## 2020-01-27 PROCEDURE — 99213 OFFICE O/P EST LOW 20 MIN: CPT | Performed by: FAMILY MEDICINE

## 2020-01-27 RX ORDER — HYDRALAZINE HYDROCHLORIDE 25 MG/1
50 TABLET, FILM COATED ORAL 3 TIMES DAILY
Qty: 540 TABLET | Refills: 5 | Status: SHIPPED | OUTPATIENT
Start: 2020-01-27

## 2020-01-27 ASSESSMENT — ENCOUNTER SYMPTOMS
BLURRED VISION: 0
SHORTNESS OF BREATH: 0
ORTHOPNEA: 0

## 2020-01-27 NOTE — PROGRESS NOTES
Subjective:     Patient ID:Hoang Perry is a 78 y.o. male. Hypertension   This is a chronic problem. The current episode started more than 1 month ago. The problem has been gradually improving since onset. The problem is controlled. Pertinent negatives include no anxiety, blurred vision, chest pain, headaches, malaise/fatigue, neck pain, orthopnea, palpitations, peripheral edema, PND, shortness of breath or sweats. There are no associated agents to hypertension. Risk factors for coronary artery disease include male gender and dyslipidemia. Past treatments include beta blockers, calcium channel blockers and direct vasodilators. The current treatment provides significant improvement. There are no compliance problems. Hypertensive end-organ damage includes CAD/MI and CVA. There is no history of angina, kidney disease, heart failure, left ventricular hypertrophy, PVD or retinopathy. There is no history of chronic renal disease, coarctation of the aorta, hyperaldosteronism, hypercortisolism, hyperparathyroidism, a hypertension causing med, pheochromocytoma, renovascular disease, sleep apnea or a thyroid problem.        No Known Allergies    Current Outpatient Medications   Medication Sig Dispense Refill    hydrALAZINE (APRESOLINE) 25 MG tablet Take 2 tablets by mouth 3 times daily 540 tablet 5    metoprolol tartrate (LOPRESSOR) 50 MG tablet Take 1 tablet by mouth 2 times daily 180 tablet 2    NIFEdipine (ADALAT CC) 60 MG extended release tablet Take 1 tablet by mouth daily 90 tablet 3    glipiZIDE (GLUCOTROL) 5 MG tablet Take 1 tablet by mouth daily 90 tablet 3    sennosides-docusate sodium (SENOKOT-S) 8.6-50 MG tablet Take 1 tablet by mouth daily q 12h      CRANBERRY CONCENTRATE PO Take by mouth      potassium chloride (KLOR-CON M) 20 MEQ TBCR extended release tablet Take 1 tablet by mouth daily (with breakfast) 90 tablet 0    amLODIPine (NORVASC) 10 MG tablet TAKE 1 TABLET EVERY DAY 90 tablet 0    tamsulosin (FLOMAX) 0.4 MG capsule Take 1 capsule by mouth daily 90 capsule 0    atorvastatin (LIPITOR) 20 MG tablet Take 1 tablet by mouth daily 90 tablet 0    aspirin EC 81 MG EC tablet Take 1 tablet by mouth daily 90 tablet 1    blood glucose test strips (TRUE METRIX BLOOD GLUCOSE TEST) strip 1 each by In Vitro route daily As needed. 100 each 3    Blood Glucose Monitoring Suppl (TRUE METRIX AIR GLUCOSE METER) GISSELLE Check  Device 3    TRUEPLUS LANCETS 33G MISC 100 each by Does not apply route 2 times daily (with meals) 100 each 5    Blood Glucose Calibration (TRUE METRIX LEVEL 2) Normal SOLN Check weekly 6 each 3    nitroGLYCERIN (NITROSTAT) 0.4 MG SL tablet Place 1 tablet under the tongue every 5 minutes as needed 25 tablet 3    Ascorbic Acid (VITAMIN C) 500 MG tablet Take 500 mg by mouth daily. No current facility-administered medications for this visit. 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   Social Needs    Financial resource strain: Not on file    Food insecurity:     Worry: Not on file     Inability: Not on file   infoBizz needs:     Medical: Not on file     Non-medical: Not on file   Tobacco Use    Smoking status: Former Smoker     Last attempt to quit: 2006     Years since quittin.0    Smokeless tobacco: Never Used   Substance and Sexual Activity    Alcohol use:  Yes     Alcohol/week: 2.0 standard drinks     Types: 2 Cans of beer per week    Drug use: No    Sexual activity: Yes     Partners: Female   Lifestyle    Physical activity:     Days per week: Not on file     Minutes per session: Not on file    Stress: Not on file   Relationships    Social connections:     Talks on phone: Not on file     Gets together: Not on file     Attends Christianity service: Not on file     Active member of club or organization: Not on file     Attends meetings of clubs or organizations: Not on file     Relationship status: Not on file    Intimate partner violence:     Fear of current or ex partner: Not on file     Emotionally abused: Not on file     Physically abused: Not on file     Forced sexual activity: Not on file   Other Topics Concern    Not on file   Social History Narrative    Retired President of [employer]; 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;PT job at [employer]    happily  3 kids here and 9 grandchildren    Hobbies--golf(2-3/w)    Exercise --walks    +seatbelts       Family History   Problem Relation Age of Onset    Heart Disease Mother     Emphysema Father     Heart Disease Father     High Cholesterol Sister        Immunization History   Administered Date(s) Administered    Influenza Vaccine, unspecified formulation 01/11/2017    Influenza Virus Vaccine 10/10/2011, 12/18/2012    Influenza, High Dose (Fluzone 65 yrs and older) 11/06/2015, 10/08/2019    Pneumococcal Conjugate 13-valent ([id number]) 11/06/2015    Pneumococcal Polysaccharide ([id number]) 01/10/2007, 10/21/2008    Td, unspecified formulation 01/10/2007    Tdap (Boostrix, Adacel) 04/13/2015    Zoster Live (Zostavax) 12/18/2012       Review of Systems  Review of Systems   Constitutional: Negative for malaise/fatigue. Eyes: Negative for blurred vision. Respiratory: Negative for shortness of breath. Cardiovascular: Negative for chest pain, palpitations, orthopnea and PND. Musculoskeletal: Negative for neck pain. Neurological: Negative for headaches.        Objective:   Physical Exam  Physical Exam  Vitals signs reviewed. Constitutional:       General: He is not in acute distress. Appearance: He is well-developed. Eyes:      Conjunctiva/sclera: Conjunctivae normal.      Pupils: Pupils are equal, round, and reactive to light. Neck:      Thyroid: No thyromegaly. Vascular: No JVD. Trachea: No tracheal deviation. Cardiovascular:      Rate and Rhythm: Normal rate and regular rhythm. Heart sounds: Normal heart sounds. No murmur. No gallop. Pulmonary:      Effort: Pulmonary effort is normal. No respiratory distress. Breath sounds: Normal breath sounds. No stridor. No wheezing or rales. Chest:      Chest wall: No tenderness. Abdominal:      General: Bowel sounds are normal. There is no distension. Palpations: Abdomen is soft. There is no mass. Tenderness: There is no abdominal tenderness. Musculoskeletal:         General: No tenderness. Lymphadenopathy:      Cervical: No cervical adenopathy. Skin:     General: Skin is warm and dry. Coloration: Skin is not pale. Findings: No erythema or rash. Neurological:      Mental Status: He is alert and oriented to person, place, and time. Cranial Nerves: No cranial nerve deficit. Motor: No abnormal muscle tone. Coordination: Coordination normal.      Deep Tendon Reflexes: Reflexes normal.   Psychiatric:         Behavior: Behavior normal.         Thought Content:  Thought content normal.         Judgment: Judgment normal.         Assessment and Plan:     Essential hypertension  Stable with current plan--TOS and SE discussed--labs

## 2020-02-06 ENCOUNTER — TELEPHONE (OUTPATIENT)
Dept: FAMILY MEDICINE CLINIC | Age: 80
End: 2020-02-06

## 2020-03-17 RX ORDER — TAMSULOSIN HYDROCHLORIDE 0.4 MG/1
0.4 CAPSULE ORAL DAILY
Qty: 90 CAPSULE | Refills: 0 | Status: SHIPPED | OUTPATIENT
Start: 2020-03-17 | End: 2020-06-10 | Stop reason: SDUPTHER

## 2020-03-17 RX ORDER — ATORVASTATIN CALCIUM 20 MG/1
20 TABLET, FILM COATED ORAL DAILY
Qty: 90 TABLET | Refills: 0 | Status: SHIPPED | OUTPATIENT
Start: 2020-03-17 | End: 2020-06-10 | Stop reason: SDUPTHER

## 2020-03-17 NOTE — TELEPHONE ENCOUNTER
Last appointment: 1/27/2020  Next appointment:   Future Appointments   Date Time Provider Shahram Danielle   5/4/2020  9:00 AM William Coley MD [facility] [address] [facility]   enough for 10 days as of right now.     Please advise

## 2020-06-10 RX ORDER — TAMSULOSIN HYDROCHLORIDE 0.4 MG/1
0.4 CAPSULE ORAL DAILY
Qty: 90 CAPSULE | Refills: 0 | Status: SHIPPED | OUTPATIENT
Start: 2020-06-10

## 2020-06-10 RX ORDER — ATORVASTATIN CALCIUM 20 MG/1
20 TABLET, FILM COATED ORAL DAILY
Qty: 90 TABLET | Refills: 0 | Status: SHIPPED | OUTPATIENT
Start: 2020-06-10

## 2020-06-23 ENCOUNTER — TELEPHONE (OUTPATIENT)
Dept: FAMILY MEDICINE CLINIC | Age: 80
End: 2020-06-23

## 2020-07-07 ENCOUNTER — TELEPHONE (OUTPATIENT)
Dept: FAMILY MEDICINE CLINIC | Age: 80
End: 2020-07-07

## 2020-07-15 ENCOUNTER — VIRTUAL VISIT (OUTPATIENT)
Dept: FAMILY MEDICINE CLINIC | Age: 80
End: 2020-07-15
Payer: MEDICARE

## 2020-07-15 PROBLEM — D89.1 CRYOGLOBULINEMIA (HCC): Status: ACTIVE | Noted: 2020-07-15

## 2020-07-15 PROCEDURE — 1123F ACP DISCUSS/DSCN MKR DOCD: CPT | Performed by: FAMILY MEDICINE

## 2020-07-15 PROCEDURE — 4040F PNEUMOC VAC/ADMIN/RCVD: CPT | Performed by: FAMILY MEDICINE

## 2020-07-15 PROCEDURE — G0439 PPPS, SUBSEQ VISIT: HCPCS | Performed by: FAMILY MEDICINE

## 2020-07-15 RX ORDER — METOPROLOL TARTRATE 50 MG/1
50 TABLET, FILM COATED ORAL 2 TIMES DAILY
Qty: 180 TABLET | Refills: 2 | Status: SHIPPED | OUTPATIENT
Start: 2020-07-15 | End: 2020-10-13

## 2020-07-15 NOTE — PROGRESS NOTES
Medicare Annual Wellness Visit  Name: Tosin Xiong Date: 7/15/2020   MRN: [id number] Sex: Male   Age: 78 y.o. Ethnicity: Non-/Non    : 1940 Race: Jelly Olguin is here for Medicare AWV    Screenings for behavioral, psychosocial and functional/safety risks, and cognitive dysfunction are all negative except as indicated below. These results, as well as other patient data from the [address] form, are documented in Flowsheets linked to this Encounter. No Known Allergies    Prior to Visit Medications    Medication Sig Taking? Authorizing Provider   metoprolol tartrate (LOPRESSOR) 50 MG tablet Take 1 tablet by mouth 2 times daily Yes Rui Zaldivar MD   tamsulosin (FLOMAX) 0.4 MG capsule Take 1 capsule by mouth daily Yes Rui Zaldivar MD   atorvastatin (LIPITOR) 20 MG tablet Take 1 tablet by mouth daily Yes Rui Zaldivar MD   hydrALAZINE (APRESOLINE) 25 MG tablet Take 2 tablets by mouth 3 times daily Yes Rui Zaldivar MD   NIFEdipine (ADALAT CC) 60 MG extended release tablet Take 1 tablet by mouth daily Yes Rui Zaldivar MD   glipiZIDE (GLUCOTROL) 5 MG tablet Take 1 tablet by mouth daily Yes Rui Zaldivar MD   CRANBERRY CONCENTRATE PO Take by mouth Yes Historical Provider, MD   aspirin EC 81 MG EC tablet Take 1 tablet by mouth daily Yes JOYCE Wilhelm CNP   blood glucose test strips (TRUE METRIX BLOOD GLUCOSE TEST) strip 1 each by In Vitro route daily As needed.  Yes JOYCE Wilhelm CNP   Blood Glucose Monitoring Suppl (TRUE METRIX AIR GLUCOSE METER) GISSELLE Check BID Yes Rui Zaldivar MD   TRUEPLUS LANCETS 33G MISC 100 each by Does not apply route 2 times daily (with meals) Yes Rui Zaldivar MD   Blood Glucose Calibration (TRUE METRIX LEVEL 2) Normal SOLN Check weekly Yes Rui Zaldivar MD   nitroGLYCERIN (NITROSTAT) 0.4 MG SL tablet Place 1 tablet under the tongue every 5 Interventions:     No Positive Risk Factors identified today. Personalized Preventive Plan   Current Health Maintenance Status  Immunization History   Administered Date(s) Administered    Influenza Vaccine, unspecified formulation 01/11/2017    Influenza Virus Vaccine 10/10/2011, 12/18/2012    Influenza, High Dose (Fluzone 65 yrs and older) 11/06/2015, 10/08/2019    Pneumococcal Conjugate 13-valent ([id number]) 11/06/2015    Pneumococcal Polysaccharide ([id number]) 01/10/2007, 10/21/2008    Td, unspecified formulation 01/10/2007    Tdap (Boostrix, Adacel) 04/13/2015    Zoster Live (Zostavax) 12/18/2012        Health Maintenance   Topic Date Due    Shingles Vaccine (2 of 3) 02/12/2013    Annual Wellness Visit (AWV)  05/29/2019    Lipid screen  05/06/2020    Flu vaccine (1) 09/01/2020    Potassium monitoring  01/27/2021    Creatinine monitoring  01/27/2021    DTaP/Tdap/Td vaccine (2 - Td) 04/13/2025    Pneumococcal 65+ years Vaccine  Completed    Hepatitis A vaccine  Aged Out    Hib vaccine  Aged Out    Meningococcal (ACWY) vaccine  Aged Out     Recommendations for [employer] Due: see orders and patient instructions/AVS.  . Recommended screening schedule for the next 5-10 years is provided to the patient in written form: see Patient Idalmis Kohli was seen today for medicare awv. 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 male being evaluated by a Virtual Visit (video and audio) encounter to address concerns as mentioned above.   MILES

## 2020-07-15 NOTE — PATIENT INSTRUCTIONS
Personalized Preventive Plan for Ester Frances - 7/15/2020  Medicare offers a range of preventive health benefits. Some of the tests and screenings are paid in full while other may be subject to a deductible, co-insurance, and/or copay. Some of these benefits include a comprehensive review of your medical history including lifestyle, illnesses that may run in your family, and various assessments and screenings as appropriate. After reviewing your medical record and screening and assessments performed today your provider may have ordered immunizations, labs, imaging, and/or referrals for you. A list of these orders (if applicable) as well as your Preventive Care list are included within your After Visit Summary for your review. Other Preventive Recommendations:    · A preventive eye exam performed by an eye specialist is recommended every 1-2 years to screen for glaucoma; cataracts, macular degeneration, and other eye disorders. · A preventive dental visit is recommended every 6 months. · Try to get at least 150 minutes of exercise per week or 10,000 steps per day on a pedometer . · Order or download the FREE \"Exercise & Physical Activity: Your Everyday Guide\" from [employer]. Call 2-214.191.1161 or search [employer] online. · You need 2498-7556 mg of calcium and 9711-5510 IU of vitamin D per day. It is possible to meet your calcium requirement with diet alone, but a vitamin D supplement is usually necessary to meet this goal.  · When exposed to the sun, use a sunscreen that protects against both UVA and UVB radiation with an SPF of 30 or greater. Reapply every 2 to 3 hours or after sweating, drying off with a towel, or swimming. · Always wear a seat belt when traveling in a car. Always wear a helmet when riding a bicycle or motorcycle.

## 2020-07-16 ENCOUNTER — APPOINTMENT (OUTPATIENT)
Dept: GENERAL RADIOLOGY | Age: 80
DRG: 208 | End: 2020-07-16
Payer: MEDICARE

## 2020-07-16 ENCOUNTER — HOSPITAL ENCOUNTER (INPATIENT)
Age: 80
LOS: 6 days | Discharge: HOME HEALTH | DRG: 208 | End: 2020-07-22
Attending: EMERGENCY MEDICINE | Admitting: HOSPITALIST
Payer: MEDICARE

## 2020-07-16 PROBLEM — J96.00 ACUTE RESPIRATORY FAILURE (HCC): Status: ACTIVE | Noted: 2020-07-16

## 2020-07-16 LAB
A/G RATIO: 1.4 (ref 1.1–2.2)
ALBUMIN SERPL-MCNC: 3.9 G/DL (ref 3.4–5)
ALP BLD-CCNC: 100 U/L (ref 40–129)
ALT SERPL-CCNC: 114 U/L (ref 10–40)
ANION GAP SERPL CALCULATED.3IONS-SCNC: 9 MMOL/L (ref 3–16)
APTT: 30 SEC (ref 24.2–36.2)
AST SERPL-CCNC: 106 U/L (ref 15–37)
BASE EXCESS ARTERIAL: -1.2 MMOL/L (ref -3–3)
BASE EXCESS ARTERIAL: 3.8 MMOL/L (ref -3–3)
BASOPHILS ABSOLUTE: 0.1 K/UL (ref 0–0.2)
BASOPHILS RELATIVE PERCENT: 0.3 %
BILIRUB SERPL-MCNC: <0.2 MG/DL (ref 0–1)
BILIRUBIN URINE: NEGATIVE
BLOOD, URINE: NEGATIVE
BUN BLDV-MCNC: 47 MG/DL (ref 7–20)
CALCIUM SERPL-MCNC: 9.2 MG/DL (ref 8.3–10.6)
CARBOXYHEMOGLOBIN ARTERIAL: 0.9 % (ref 0–1.5)
CARBOXYHEMOGLOBIN ARTERIAL: 1.6 % (ref 0–1.5)
CHLORIDE BLD-SCNC: 107 MMOL/L (ref 99–110)
CLARITY: ABNORMAL
CO2: 27 MMOL/L (ref 21–32)
COARSE CASTS, UA: ABNORMAL /LPF (ref 0–2)
COLOR: YELLOW
CREAT SERPL-MCNC: 3.7 MG/DL (ref 0.8–1.3)
D DIMER: 958 NG/ML DDU (ref 0–229)
EKG ATRIAL RATE: 110 BPM
EKG DIAGNOSIS: NORMAL
EKG P AXIS: 88 DEGREES
EKG P-R INTERVAL: 160 MS
EKG Q-T INTERVAL: 332 MS
EKG QRS DURATION: 90 MS
EKG QTC CALCULATION (BAZETT): 449 MS
EKG R AXIS: -11 DEGREES
EKG T AXIS: 54 DEGREES
EKG VENTRICULAR RATE: 110 BPM
EOSINOPHILS ABSOLUTE: 0.1 K/UL (ref 0–0.6)
EOSINOPHILS RELATIVE PERCENT: 0.4 %
EPITHELIAL CELLS, UA: 6 /HPF (ref 0–5)
FERRITIN: 162.6 NG/ML (ref 30–400)
GFR AFRICAN AMERICAN: 19
GFR NON-AFRICAN AMERICAN: 16
GLOBULIN: 2.7 G/DL
GLUCOSE BLD-MCNC: 128 MG/DL (ref 70–99)
GLUCOSE BLD-MCNC: 228 MG/DL (ref 70–99)
GLUCOSE URINE: 100 MG/DL
HCO3 ARTERIAL: 28.3 MMOL/L (ref 21–29)
HCO3 ARTERIAL: 29.1 MMOL/L (ref 21–29)
HCT VFR BLD CALC: 27.8 % (ref 40.5–52.5)
HEMOGLOBIN, ART, EXTENDED: 6.7 G/DL (ref 13.5–17.5)
HEMOGLOBIN, ART, EXTENDED: 8.3 G/DL (ref 13.5–17.5)
HEMOGLOBIN: 8.5 G/DL (ref 13.5–17.5)
HYALINE CASTS: 6 /LPF (ref 0–8)
INR BLD: 0.91 (ref 0.86–1.14)
KETONES, URINE: NEGATIVE MG/DL
LACTATE DEHYDROGENASE: 284 U/L (ref 100–190)
LACTIC ACID: 0.8 MMOL/L (ref 0.4–2)
LEUKOCYTE ESTERASE, URINE: NEGATIVE
LIPASE: 45 U/L (ref 13–60)
LYMPHOCYTES ABSOLUTE: 2.7 K/UL (ref 1–5.1)
LYMPHOCYTES RELATIVE PERCENT: 13.9 %
MCH RBC QN AUTO: 27.4 PG (ref 26–34)
MCHC RBC AUTO-ENTMCNC: 30.4 G/DL (ref 31–36)
MCV RBC AUTO: 90.1 FL (ref 80–100)
METHEMOGLOBIN ARTERIAL: 0.2 %
METHEMOGLOBIN ARTERIAL: 0.8 %
MICROSCOPIC EXAMINATION: YES
MONOCYTES ABSOLUTE: 1.3 K/UL (ref 0–1.3)
MONOCYTES RELATIVE PERCENT: 6.5 %
NEUTROPHILS ABSOLUTE: 15.4 K/UL (ref 1.7–7.7)
NEUTROPHILS RELATIVE PERCENT: 78.9 %
NITRITE, URINE: NEGATIVE
O2 CONTENT ARTERIAL: 10 ML/DL
O2 CONTENT ARTERIAL: 11 ML/DL
O2 SAT, ARTERIAL: 94.9 %
O2 SAT, ARTERIAL: 99.8 %
O2 THERAPY: ABNORMAL
O2 THERAPY: ABNORMAL
PCO2 ARTERIAL: 42.1 MMHG (ref 35–45)
PCO2 ARTERIAL: 91.7 MMHG (ref 35–45)
PDW BLD-RTO: 17.8 % (ref 12.4–15.4)
PERFORMED ON: ABNORMAL
PH ARTERIAL: 7.11 (ref 7.35–7.45)
PH ARTERIAL: 7.44 (ref 7.35–7.45)
PH UA: 5 (ref 5–8)
PLATELET # BLD: 287 K/UL (ref 135–450)
PMV BLD AUTO: 9 FL (ref 5–10.5)
PO2 ARTERIAL: 143 MMHG (ref 75–108)
PO2 ARTERIAL: 92.8 MMHG (ref 75–108)
POTASSIUM REFLEX MAGNESIUM: 5.3 MMOL/L (ref 3.5–5.1)
PRO-BNP: 6218 PG/ML (ref 0–449)
PROCALCITONIN: 0.12 NG/ML (ref 0–0.15)
PROTEIN UA: 100 MG/DL
PROTHROMBIN TIME: 10.5 SEC (ref 10–13.2)
RBC # BLD: 3.09 M/UL (ref 4.2–5.9)
RBC UA: 1 /HPF (ref 0–4)
SODIUM BLD-SCNC: 143 MMOL/L (ref 136–145)
SPECIFIC GRAVITY UA: 1.01 (ref 1–1.03)
TCO2 ARTERIAL: 66.4 MMOL/L
TCO2 ARTERIAL: 71.6 MMOL/L
TOTAL CK: 49 U/L (ref 39–308)
TOTAL PROTEIN: 6.6 G/DL (ref 6.4–8.2)
TROPONIN: 0.04 NG/ML
URINE REFLEX TO CULTURE: ABNORMAL
URINE TYPE: ABNORMAL
UROBILINOGEN, URINE: 0.2 E.U./DL
WBC # BLD: 19.6 K/UL (ref 4–11)
WBC UA: 5 /HPF (ref 0–5)

## 2020-07-16 PROCEDURE — 84145 PROCALCITONIN (PCT): CPT

## 2020-07-16 PROCEDURE — 6360000002 HC RX W HCPCS: Performed by: HOSPITALIST

## 2020-07-16 PROCEDURE — 36415 COLL VENOUS BLD VENIPUNCTURE: CPT

## 2020-07-16 PROCEDURE — C9113 INJ PANTOPRAZOLE SODIUM, VIA: HCPCS | Performed by: INTERNAL MEDICINE

## 2020-07-16 PROCEDURE — 83615 LACTATE (LD) (LDH) ENZYME: CPT

## 2020-07-16 PROCEDURE — 2500000003 HC RX 250 WO HCPCS: Performed by: EMERGENCY MEDICINE

## 2020-07-16 PROCEDURE — 85025 COMPLETE CBC W/AUTO DIFF WBC: CPT

## 2020-07-16 PROCEDURE — 51702 INSERT TEMP BLADDER CATH: CPT

## 2020-07-16 PROCEDURE — 96366 THER/PROPH/DIAG IV INF ADDON: CPT

## 2020-07-16 PROCEDURE — 6360000002 HC RX W HCPCS: Performed by: INTERNAL MEDICINE

## 2020-07-16 PROCEDURE — 6360000002 HC RX W HCPCS: Performed by: EMERGENCY MEDICINE

## 2020-07-16 PROCEDURE — 94750 HC PULMONARY COMPLIANCE STUDY: CPT

## 2020-07-16 PROCEDURE — U0003 INFECTIOUS AGENT DETECTION BY NUCLEIC ACID (DNA OR RNA); SEVERE ACUTE RESPIRATORY SYNDROME CORONAVIRUS 2 (SARS-COV-2) (CORONAVIRUS DISEASE [COVID-19]), AMPLIFIED PROBE TECHNIQUE, MAKING USE OF HIGH THROUGHPUT TECHNOLOGIES AS DESCRIBED BY CMS-2020-01-R: HCPCS

## 2020-07-16 PROCEDURE — 82550 ASSAY OF CK (CPK): CPT

## 2020-07-16 PROCEDURE — 02HV33Z INSERTION OF INFUSION DEVICE INTO SUPERIOR VENA CAVA, PERCUTANEOUS APPROACH: ICD-10-PCS | Performed by: EMERGENCY MEDICINE

## 2020-07-16 PROCEDURE — 83605 ASSAY OF LACTIC ACID: CPT

## 2020-07-16 PROCEDURE — 6370000000 HC RX 637 (ALT 250 FOR IP): Performed by: HOSPITALIST

## 2020-07-16 PROCEDURE — 93005 ELECTROCARDIOGRAM TRACING: CPT | Performed by: EMERGENCY MEDICINE

## 2020-07-16 PROCEDURE — 99291 CRITICAL CARE FIRST HOUR: CPT | Performed by: INTERNAL MEDICINE

## 2020-07-16 PROCEDURE — 96365 THER/PROPH/DIAG IV INF INIT: CPT

## 2020-07-16 PROCEDURE — 96375 TX/PRO/DX INJ NEW DRUG ADDON: CPT

## 2020-07-16 PROCEDURE — 31500 INSERT EMERGENCY AIRWAY: CPT

## 2020-07-16 PROCEDURE — 1200000000 HC SEMI PRIVATE

## 2020-07-16 PROCEDURE — 85379 FIBRIN DEGRADATION QUANT: CPT

## 2020-07-16 PROCEDURE — 36556 INSERT NON-TUNNEL CV CATH: CPT

## 2020-07-16 PROCEDURE — 2580000003 HC RX 258: Performed by: HOSPITALIST

## 2020-07-16 PROCEDURE — 0BH18EZ INSERTION OF ENDOTRACHEAL AIRWAY INTO TRACHEA, VIA NATURAL OR ARTIFICIAL OPENING ENDOSCOPIC: ICD-10-PCS | Performed by: INTERNAL MEDICINE

## 2020-07-16 PROCEDURE — 82728 ASSAY OF FERRITIN: CPT

## 2020-07-16 PROCEDURE — 99285 EMERGENCY DEPT VISIT HI MDM: CPT

## 2020-07-16 PROCEDURE — 87040 BLOOD CULTURE FOR BACTERIA: CPT

## 2020-07-16 PROCEDURE — 84484 ASSAY OF TROPONIN QUANT: CPT

## 2020-07-16 PROCEDURE — 5A1945Z RESPIRATORY VENTILATION, 24-96 CONSECUTIVE HOURS: ICD-10-PCS | Performed by: INTERNAL MEDICINE

## 2020-07-16 PROCEDURE — 93010 ELECTROCARDIOGRAM REPORT: CPT | Performed by: INTERNAL MEDICINE

## 2020-07-16 PROCEDURE — 74018 RADEX ABDOMEN 1 VIEW: CPT

## 2020-07-16 PROCEDURE — 71045 X-RAY EXAM CHEST 1 VIEW: CPT

## 2020-07-16 PROCEDURE — 85730 THROMBOPLASTIN TIME PARTIAL: CPT

## 2020-07-16 PROCEDURE — 82803 BLOOD GASES ANY COMBINATION: CPT

## 2020-07-16 PROCEDURE — 80053 COMPREHEN METABOLIC PANEL: CPT

## 2020-07-16 PROCEDURE — 94761 N-INVAS EAR/PLS OXIMETRY MLT: CPT

## 2020-07-16 PROCEDURE — 6370000000 HC RX 637 (ALT 250 FOR IP): Performed by: EMERGENCY MEDICINE

## 2020-07-16 PROCEDURE — 2580000003 HC RX 258: Performed by: EMERGENCY MEDICINE

## 2020-07-16 PROCEDURE — 83880 ASSAY OF NATRIURETIC PEPTIDE: CPT

## 2020-07-16 PROCEDURE — 2700000000 HC OXYGEN THERAPY PER DAY

## 2020-07-16 PROCEDURE — 87449 NOS EACH ORGANISM AG IA: CPT

## 2020-07-16 PROCEDURE — 83690 ASSAY OF LIPASE: CPT

## 2020-07-16 PROCEDURE — 85610 PROTHROMBIN TIME: CPT

## 2020-07-16 PROCEDURE — 96368 THER/DIAG CONCURRENT INF: CPT

## 2020-07-16 PROCEDURE — 94002 VENT MGMT INPAT INIT DAY: CPT

## 2020-07-16 PROCEDURE — 81001 URINALYSIS AUTO W/SCOPE: CPT

## 2020-07-16 RX ORDER — DEXTROSE MONOHYDRATE 50 MG/ML
100 INJECTION, SOLUTION INTRAVENOUS PRN
Status: DISCONTINUED | OUTPATIENT
Start: 2020-07-16 | End: 2020-07-16 | Stop reason: SDUPTHER

## 2020-07-16 RX ORDER — LINEZOLID 2 MG/ML
600 INJECTION, SOLUTION INTRAVENOUS EVERY 12 HOURS
Status: DISCONTINUED | OUTPATIENT
Start: 2020-07-16 | End: 2020-07-18 | Stop reason: ALTCHOICE

## 2020-07-16 RX ORDER — ACETAMINOPHEN 650 MG/1
650 SUPPOSITORY RECTAL EVERY 6 HOURS PRN
Status: DISCONTINUED | OUTPATIENT
Start: 2020-07-16 | End: 2020-07-22 | Stop reason: HOSPADM

## 2020-07-16 RX ORDER — PROMETHAZINE HYDROCHLORIDE 25 MG/1
12.5 TABLET ORAL EVERY 6 HOURS PRN
Status: DISCONTINUED | OUTPATIENT
Start: 2020-07-16 | End: 2020-07-22 | Stop reason: HOSPADM

## 2020-07-16 RX ORDER — NICOTINE POLACRILEX 4 MG
15 LOZENGE BUCCAL PRN
Status: DISCONTINUED | OUTPATIENT
Start: 2020-07-16 | End: 2020-07-22 | Stop reason: HOSPADM

## 2020-07-16 RX ORDER — METOPROLOL TARTRATE 50 MG/1
50 TABLET, FILM COATED ORAL 2 TIMES DAILY
Status: DISCONTINUED | OUTPATIENT
Start: 2020-07-16 | End: 2020-07-22 | Stop reason: HOSPADM

## 2020-07-16 RX ORDER — SODIUM CHLORIDE 0.9 % (FLUSH) 0.9 %
10 SYRINGE (ML) INJECTION EVERY 12 HOURS SCHEDULED
Status: DISCONTINUED | OUTPATIENT
Start: 2020-07-16 | End: 2020-07-22 | Stop reason: HOSPADM

## 2020-07-16 RX ORDER — SODIUM CHLORIDE 0.9 % (FLUSH) 0.9 %
10 SYRINGE (ML) INJECTION PRN
Status: DISCONTINUED | OUTPATIENT
Start: 2020-07-16 | End: 2020-07-22 | Stop reason: HOSPADM

## 2020-07-16 RX ORDER — PANTOPRAZOLE SODIUM 40 MG/10ML
40 INJECTION, POWDER, LYOPHILIZED, FOR SOLUTION INTRAVENOUS DAILY
Status: DISCONTINUED | OUTPATIENT
Start: 2020-07-16 | End: 2020-07-22 | Stop reason: HOSPADM

## 2020-07-16 RX ORDER — ACETAMINOPHEN 325 MG/1
650 TABLET ORAL EVERY 6 HOURS PRN
Status: DISCONTINUED | OUTPATIENT
Start: 2020-07-16 | End: 2020-07-22 | Stop reason: HOSPADM

## 2020-07-16 RX ORDER — ONDANSETRON 2 MG/ML
4 INJECTION INTRAMUSCULAR; INTRAVENOUS EVERY 6 HOURS PRN
Status: DISCONTINUED | OUTPATIENT
Start: 2020-07-16 | End: 2020-07-22 | Stop reason: HOSPADM

## 2020-07-16 RX ORDER — DEXAMETHASONE 4 MG/1
6 TABLET ORAL DAILY
Status: DISCONTINUED | OUTPATIENT
Start: 2020-07-16 | End: 2020-07-18

## 2020-07-16 RX ORDER — DEXTROSE MONOHYDRATE 25 G/50ML
12.5 INJECTION, SOLUTION INTRAVENOUS PRN
Status: DISCONTINUED | OUTPATIENT
Start: 2020-07-16 | End: 2020-07-16 | Stop reason: SDUPTHER

## 2020-07-16 RX ORDER — METOPROLOL TARTRATE 5 MG/5ML
5 INJECTION INTRAVENOUS ONCE
Status: COMPLETED | OUTPATIENT
Start: 2020-07-16 | End: 2020-07-16

## 2020-07-16 RX ORDER — HEPARIN SODIUM 1000 [USP'U]/ML
80 INJECTION, SOLUTION INTRAVENOUS; SUBCUTANEOUS PRN
Status: DISCONTINUED | OUTPATIENT
Start: 2020-07-16 | End: 2020-07-16 | Stop reason: ALTCHOICE

## 2020-07-16 RX ORDER — HEPARIN SODIUM 1000 [USP'U]/ML
80 INJECTION, SOLUTION INTRAVENOUS; SUBCUTANEOUS ONCE
Status: COMPLETED | OUTPATIENT
Start: 2020-07-16 | End: 2020-07-16

## 2020-07-16 RX ORDER — HYDRALAZINE HYDROCHLORIDE 20 MG/ML
10 INJECTION INTRAMUSCULAR; INTRAVENOUS EVERY 6 HOURS PRN
Status: DISCONTINUED | OUTPATIENT
Start: 2020-07-16 | End: 2020-07-22 | Stop reason: HOSPADM

## 2020-07-16 RX ORDER — NICOTINE POLACRILEX 4 MG
15 LOZENGE BUCCAL PRN
Status: DISCONTINUED | OUTPATIENT
Start: 2020-07-16 | End: 2020-07-16 | Stop reason: SDUPTHER

## 2020-07-16 RX ORDER — FUROSEMIDE 10 MG/ML
20 INJECTION INTRAMUSCULAR; INTRAVENOUS DAILY
Status: COMPLETED | OUTPATIENT
Start: 2020-07-17 | End: 2020-07-19

## 2020-07-16 RX ORDER — POLYETHYLENE GLYCOL 3350 17 G/17G
17 POWDER, FOR SOLUTION ORAL DAILY PRN
Status: DISCONTINUED | OUTPATIENT
Start: 2020-07-16 | End: 2020-07-22 | Stop reason: HOSPADM

## 2020-07-16 RX ORDER — ROCURONIUM BROMIDE 10 MG/ML
100 INJECTION, SOLUTION INTRAVENOUS ONCE
Status: COMPLETED | OUTPATIENT
Start: 2020-07-16 | End: 2020-07-16

## 2020-07-16 RX ORDER — TAMSULOSIN HYDROCHLORIDE 0.4 MG/1
0.4 CAPSULE ORAL DAILY
Status: DISCONTINUED | OUTPATIENT
Start: 2020-07-17 | End: 2020-07-22 | Stop reason: HOSPADM

## 2020-07-16 RX ORDER — INSULIN LISPRO 100 [IU]/ML
0-6 INJECTION, SOLUTION INTRAVENOUS; SUBCUTANEOUS EVERY 4 HOURS
Status: DISCONTINUED | OUTPATIENT
Start: 2020-07-17 | End: 2020-07-19

## 2020-07-16 RX ORDER — HEPARIN SODIUM 5000 [USP'U]/ML
5000 INJECTION, SOLUTION INTRAVENOUS; SUBCUTANEOUS EVERY 8 HOURS SCHEDULED
Status: DISCONTINUED | OUTPATIENT
Start: 2020-07-16 | End: 2020-07-22 | Stop reason: HOSPADM

## 2020-07-16 RX ORDER — DEXTROSE MONOHYDRATE 50 MG/ML
100 INJECTION, SOLUTION INTRAVENOUS PRN
Status: DISCONTINUED | OUTPATIENT
Start: 2020-07-16 | End: 2020-07-22 | Stop reason: HOSPADM

## 2020-07-16 RX ORDER — HEPARIN SODIUM 10000 [USP'U]/100ML
18 INJECTION, SOLUTION INTRAVENOUS CONTINUOUS
Status: DISCONTINUED | OUTPATIENT
Start: 2020-07-16 | End: 2020-07-16

## 2020-07-16 RX ORDER — PROPOFOL 10 MG/ML
10 INJECTION, EMULSION INTRAVENOUS
Status: DISCONTINUED | OUTPATIENT
Start: 2020-07-16 | End: 2020-07-18

## 2020-07-16 RX ORDER — NIFEDIPINE 30 MG/1
60 TABLET, EXTENDED RELEASE ORAL DAILY
Status: DISCONTINUED | OUTPATIENT
Start: 2020-07-17 | End: 2020-07-22 | Stop reason: HOSPADM

## 2020-07-16 RX ORDER — ASPIRIN 81 MG/1
81 TABLET ORAL DAILY
Status: DISCONTINUED | OUTPATIENT
Start: 2020-07-17 | End: 2020-07-22 | Stop reason: HOSPADM

## 2020-07-16 RX ORDER — DEXTROSE MONOHYDRATE 25 G/50ML
12.5 INJECTION, SOLUTION INTRAVENOUS PRN
Status: DISCONTINUED | OUTPATIENT
Start: 2020-07-16 | End: 2020-07-22 | Stop reason: HOSPADM

## 2020-07-16 RX ORDER — ETOMIDATE 2 MG/ML
20 INJECTION INTRAVENOUS ONCE
Status: COMPLETED | OUTPATIENT
Start: 2020-07-16 | End: 2020-07-16

## 2020-07-16 RX ORDER — ATORVASTATIN CALCIUM 20 MG/1
20 TABLET, FILM COATED ORAL DAILY
Status: DISCONTINUED | OUTPATIENT
Start: 2020-07-17 | End: 2020-07-22 | Stop reason: HOSPADM

## 2020-07-16 RX ORDER — HEPARIN SODIUM 1000 [USP'U]/ML
40 INJECTION, SOLUTION INTRAVENOUS; SUBCUTANEOUS PRN
Status: DISCONTINUED | OUTPATIENT
Start: 2020-07-16 | End: 2020-07-16 | Stop reason: ALTCHOICE

## 2020-07-16 RX ORDER — HYDRALAZINE HYDROCHLORIDE 25 MG/1
50 TABLET, FILM COATED ORAL 3 TIMES DAILY
Status: DISCONTINUED | OUTPATIENT
Start: 2020-07-16 | End: 2020-07-17

## 2020-07-16 RX ORDER — ASCORBIC ACID 500 MG
500 TABLET ORAL DAILY
Status: DISCONTINUED | OUTPATIENT
Start: 2020-07-17 | End: 2020-07-22 | Stop reason: HOSPADM

## 2020-07-16 RX ORDER — FUROSEMIDE 10 MG/ML
40 INJECTION INTRAMUSCULAR; INTRAVENOUS ONCE
Status: COMPLETED | OUTPATIENT
Start: 2020-07-16 | End: 2020-07-16

## 2020-07-16 RX ADMIN — FENTANYL CITRATE 0.5 MCG/KG/HR: 50 INJECTION INTRAVENOUS at 23:39

## 2020-07-16 RX ADMIN — DEXAMETHASONE 6 MG: 4 TABLET ORAL at 18:02

## 2020-07-16 RX ADMIN — HYDRALAZINE HYDROCHLORIDE 50 MG: 25 TABLET, FILM COATED ORAL at 23:38

## 2020-07-16 RX ADMIN — HEPARIN SODIUM 5000 UNITS: 5000 INJECTION INTRAVENOUS; SUBCUTANEOUS at 21:38

## 2020-07-16 RX ADMIN — PROPOFOL 10 MCG/KG/MIN: 10 INJECTION, EMULSION INTRAVENOUS at 11:00

## 2020-07-16 RX ADMIN — VANCOMYCIN HYDROCHLORIDE 1000 MG: 1 INJECTION, POWDER, LYOPHILIZED, FOR SOLUTION INTRAVENOUS at 13:10

## 2020-07-16 RX ADMIN — PROPOFOL 50 MCG/KG/MIN: 10 INJECTION, EMULSION INTRAVENOUS at 22:40

## 2020-07-16 RX ADMIN — ETOMIDATE 20 MG: 2 INJECTION INTRAVENOUS at 10:56

## 2020-07-16 RX ADMIN — CEFEPIME HYDROCHLORIDE 2 G: 2 INJECTION, POWDER, FOR SOLUTION INTRAVENOUS at 12:30

## 2020-07-16 RX ADMIN — Medication 10 ML: at 20:09

## 2020-07-16 RX ADMIN — ROCURONIUM BROMIDE 100 MG: 10 INJECTION, SOLUTION INTRAVENOUS at 10:57

## 2020-07-16 RX ADMIN — HEPARIN SODIUM 5810 UNITS: 1000 INJECTION INTRAVENOUS; SUBCUTANEOUS at 13:55

## 2020-07-16 RX ADMIN — HEPARIN SODIUM 18 UNITS/KG/HR: 10000 INJECTION, SOLUTION INTRAVENOUS at 13:55

## 2020-07-16 RX ADMIN — METOPROLOL TARTRATE 5 MG: 5 INJECTION INTRAVENOUS at 13:06

## 2020-07-16 RX ADMIN — LINEZOLID 600 MG: 600 INJECTION, SOLUTION INTRAVENOUS at 17:51

## 2020-07-16 RX ADMIN — FUROSEMIDE 40 MG: 10 INJECTION, SOLUTION INTRAMUSCULAR; INTRAVENOUS at 13:06

## 2020-07-16 RX ADMIN — PANTOPRAZOLE SODIUM 40 MG: 40 INJECTION, POWDER, FOR SOLUTION INTRAVENOUS at 17:51

## 2020-07-16 RX ADMIN — NITROGLYCERIN 1 INCH: 20 OINTMENT TOPICAL at 13:06

## 2020-07-16 ASSESSMENT — PAIN SCALES - GENERAL
PAINLEVEL_OUTOF10: 0

## 2020-07-16 ASSESSMENT — PULMONARY FUNCTION TESTS
PIF_VALUE: 35
PIF_VALUE: 27
PIF_VALUE: 29
PIF_VALUE: 35

## 2020-07-16 NOTE — ED NOTES
Pa Carrera MD, Mary Juárez RT, Ana RN, Catherine Dunham RN, and Gina RN at bedside prepared to intubate.        Janusz Sen, SILVINA  07/16/20 4438

## 2020-07-16 NOTE — ED NOTES
20mg Etomidate given IVP by Pearl Rich MD, followed by 100 mg of Rocc by Pearl Rich MD IVP.      Constanza Jesus RN  07/16/20 2154

## 2020-07-16 NOTE — ED NOTES
Pt stable, on ventilator at this time and sedated with propofol infusion. Panic labs received and Nadiya Navarrete MD notified.      Alex Bello RN  07/16/20 1070

## 2020-07-16 NOTE — ED NOTES
7.5 ET tube, 24 at the lip. Breath sounds auscultated and color change noted on CO2 detector.      Meryl Villalobos RN  07/16/20 0959

## 2020-07-16 NOTE — PROGRESS NOTES
Pt. Sedate resting quietly with eyes closed. Propofol to 50 mcg as pt. Biting on ETT. Heparin gtt discontinued. CT scan ordered. ABG done and FIO2 turned down to 35%. Gastric secretions reddish brown OG with 150 ml output, off at this time, Protonix started.

## 2020-07-16 NOTE — ED NOTES
FC placed without issue. Rectal temp obtained. Pt noted to have large solid BM, no blood noted. Pt cleaned, pericare performed and pad replaced under pt.      Walker Gosselin, RN  07/16/20 9295

## 2020-07-16 NOTE — PROGRESS NOTES
09/23/2019     PTT:    Lab Results   Component Value Date    APTT 30.0 07/16/2020    APTT 29.3 09/23/2019     PAULINA:  No results found for: ANATITER, PAULINA            RADIOLOGY:    Xr Chest Portable    Result Date: 7/16/2020  EXAMINATION: ONE XRAY VIEW OF THE CHEST 7/16/2020 11:03 am COMPARISON: Chest 07/14/2006 HISTORY: ORDERING SYSTEM PROVIDED HISTORY: resp fail TECHNOLOGIST PROVIDED HISTORY: Reason for exam:->resp fail Reason for Exam: resp failure Acuity: Acute Type of Exam: Initial FINDINGS: Endotracheal tube tip projects over the mid trachea level, 5.9 cm superior to the jose. Right IJ hemodialysis catheter tip overlies the distal superior vena cava level. Suboptimal LPO projection. Vascular engorgement and cephalization is demonstrated with bilateral peribronchial cuffing and perivascular haziness. Dense opacity right parahilar lung, right infrahilar lung and medial and central right lower lung. No dense consolidation evident left lung. No definite pleural effusion. Chronic appearing coarse interstitial densities predominate perihilar regions and lung bases, typical of sequela from smoking or other previous infectious/inflammatory process. Cardiac silhouette appears within normal limits for size. Right hilum is obscured. Left hilar silhouette is not well profiled because of LPO projection. Calcific atherosclerotic disease aorta. 1. Indeterminate opacity medial and central mid and lower right lung may reflect consolidation and/or mass lesion. Right hilar adenopathy is a consideration as well. Sequela from pneumonia or pulmonary edema are considerations. 2.  Congestive heart failure on a background of chronic pulmonary sequela is likely given the radiographic findings. 3.  Calcific atherosclerotic disease aorta.      Xr Abdomen For Ng/og/ne Tube Placement    Result Date: 7/16/2020  EXAMINATION: ONE SUPINE XRAY VIEW(S) OF THE ABDOMEN 7/16/2020 11:47 am COMPARISON: Chest radiograph, 1 hour prior HISTORY: ORDERING SYSTEM PROVIDED HISTORY: Confirmation of course of NG/OG/NE tube and location of tip of tube TECHNOLOGIST PROVIDED HISTORY: Reason for exam:->Confirmation of course of NG/OG/NE tube and location of tip of tube Portable? ->Yes Reason for Exam: Confirmation of course of NG/OG/NE tube and location of tip of tube Acuity: Acute Type of Exam: Initial FINDINGS: While listed as abdomen film, two views are obtained which includes the entire chest and upper portion of the abdomen. This does however revealed the NG tube position as requested. The tube extends along the course of the esophagus with the tip into the left stomach. The distal side port is at the GE junction. Other findings with parenchymal lung disease, right IJ tunneled dialysis catheter, endotracheal tube unchanged. The asymmetric disease in the right mid lung partly overlying the hilum also again noted possible aspiration or pneumonia. Underlying mass or adenopathy not entirely excluded. The NG tube extends into the stomach directed leftward, side hole at the GE junction. Imaging Results.   Chest X Ray reviwed by me          Electronically Signed: Reny Alejandra MD 7/16/2020 5:07 PM

## 2020-07-16 NOTE — PROGRESS NOTES
Pt. Admitted to ICU 5915. Pulmonologist notified. Pt. On ventilator. Sedate, no S/S of discomfort. RT suctioned thick tan mucus from lungs.

## 2020-07-16 NOTE — ED NOTES
RT at bedside due to constant high pressure alarm on vent. suctioned multiple times with return of thick mucous.      Katie Butler RN  07/16/20 1372

## 2020-07-16 NOTE — PROGRESS NOTES
Suctioned moderate amount of bloody sputum from in-line catheter ETT. Heparin discontinued at 1700 hour. Awaiting call from CT.

## 2020-07-16 NOTE — PROGRESS NOTES
07/16/20 1541   Vent Patient Data   Plateau Pressure 22 [id number]   Static Compliance 13.26 mL/cmH2O   Dynamic Compliance 20.6 mL/cmH2O   Total PEEP 5.6 cmH20   Auto PEEP 0.6 cmH20

## 2020-07-16 NOTE — ED NOTES
Report given to Leah Javed RN over phone. Pt alert and oriented and shows no signs of distress at time of transfer to [facility] [address] [address]. Pt taken to room by Elvia Sanders RN and Tech in bed. Pt on lifepak at time of transfer.        Kevin eRed RN  07/16/20 9762

## 2020-07-16 NOTE — ED NOTES
Zuly at bedside, agrees that pt needs intubated. Will move to bed 2 and prepare to intubate.      Dora Gastelum RN  07/16/20 3382

## 2020-07-16 NOTE — CONSULTS
[facility] Pulmonary and Critical Care   Consult Note      Reason for Consult: Respiratory distress/intubation status  Requesting Physician: Ayesha Limon  Subjective:   CHIEF COMPLAINT: Respiratory distress and hypoxia     HPI: All information was obtained from the chart and upon speaking to RN, patient is currently intubated. 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 Also has history of COPD on 3 L O2. Patient noted to be lethargic with significant hypoxia at home. Hence brought to the ER for further evaluation. Patient noted to have O2 sats in the 60s-intubated and transferred to ICU for further management. Other history includes coronary artery disease status post stent. Type 2 diabetes. Overall poor performance status due to above multiple comorbidities.        The patient is a 78 y.o. male with significant past medical history of:      Diagnosis Date    CAD (coronary artery disease) 2004    Hyperlipidemia     Hypertension     Type II or unspecified type diabetes mellitus without mention of complication, not stated as uncontrolled         Past Surgical History:        Procedure Laterality Date    CORONARY ANGIOPLASTY WITH STENT PLACEMENT  2004     Current Medications:    Current Facility-Administered Medications: propofol injection, 10 mcg/kg/min, Intravenous, Titrated  heparin (porcine) injection 5,810 Units, 80 Units/kg, Intravenous, PRN  heparin (porcine) injection 2,900 Units, 40 Units/kg, Intravenous, PRN  heparin 25,000 units in dextrose 5% 250 mL infusion, 18 Units/kg/hr, Intravenous, Continuous  sodium chloride flush 0.9 % injection 10 mL, 10 mL, Intravenous, 2 times per day  sodium chloride flush 0.9 % injection 10 mL, 10 mL, Intravenous, PRN  acetaminophen (TYLENOL) tablet 650 mg, 650 1400 131/60 -- -- 70 20 -- --   07/16/20 1345 (!) 157/70 -- -- 76 21 99 % --   07/16/20 1330 (!) 150/66 -- -- 69 20 99 % --   07/16/20 1315 (!) 170/85 -- -- 72 20 99 % --   07/16/20 1300 (!) 171/79 -- -- 74 20 100 % --   07/16/20 1245 (!) 186/81 -- -- 77 21 100 % --   07/16/20 1230 (!) 200/87 -- -- 78 18 100 % --   07/16/20 1215 (!) 200/84 -- -- 81 20 100 % --   07/16/20 1200 (!) 210/104 -- -- 90 20 100 % --   07/16/20 1145 (!) 197/92 -- -- 90 20 100 % --   07/16/20 1130 (!) 193/86 98.4 °F (36.9 °C) -- 94 16 100 % --   07/16/20 1115 -- -- -- 104 16 100 % --   07/16/20 1100 126/61 -- -- 111 14 100 % --   07/16/20 1053 (!) 172/69 -- -- 107 (!) 33 100 % --   07/16/20 1043 (!) 177/72 -- -- 110 (!) 33 95 % 160 lb (72.6 kg)     No intake/output data recorded. I/O this shift:  In: -   Out: 400 [Urine:400]    Physical Exam:    Due to the current efforts to prevent transmission of COVID-19 and also the need to preserve PPE for other caregivers, a face-to-face encounter with the patient was not performed. That being said, all relevant records and diagnostic tests were reviewed, including laboratory results and imaging. Please reference any relevant documentation elsewhere. Care will be coordinated with the primary service. Data Review:  CBC:   Lab Results   Component Value Date    WBC 19.6 07/16/2020    RBC 3.09 07/16/2020     BMP:   Lab Results   Component Value Date    GLUCOSE 228 07/16/2020    CO2 27 07/16/2020    BUN 47 07/16/2020    CREATININE 3.7 07/16/2020    CALCIUM 9.2 07/16/2020     ABG:   Lab Results   Component Value Date    QDX7VSP 29.1 07/16/2020    BEART -1.2 07/16/2020    E9UYYAVX 94.9 07/16/2020    PHART 7.110 07/16/2020    AAW2MCV 91.7 07/16/2020    PO2ART 92.8 07/16/2020    WUN0HIG 71.6 07/16/2020       Radiology: All pertinent images / reports were reviewed as a part of this visit.     Narrative    EXAMINATION:    ONE XRAY VIEW OF THE CHEST         7/16/2020 11:03 am         COMPARISON:    Chest 07/14/2006         HISTORY:    ORDERING SYSTEM PROVIDED HISTORY: resp fail    TECHNOLOGIST PROVIDED HISTORY:    Reason for exam:->resp fail    Reason for Exam: resp failure    Acuity: Acute    Type of Exam: Initial         FINDINGS:    Endotracheal tube tip projects over the mid trachea level, 5.9 cm superior to    the jose.  Right IJ hemodialysis catheter tip overlies the distal superior    vena cava level.  Suboptimal LPO projection.  Vascular engorgement and    cephalization is demonstrated with bilateral peribronchial cuffing and    perivascular haziness.  Dense opacity right parahilar lung, right infrahilar    lung and medial and central right lower lung.  No dense consolidation evident    left lung.  No definite pleural effusion.  Chronic appearing coarse    interstitial densities predominate perihilar regions and lung bases, typical    of sequela from smoking or other previous infectious/inflammatory process.         Cardiac silhouette appears within normal limits for size.  Right hilum is    obscured.  Left hilar silhouette is not well profiled because of LPO    projection.  Calcific atherosclerotic disease aorta.              Impression    1. Indeterminate opacity medial and central mid and lower right lung may    reflect consolidation and/or mass lesion.  Right hilar adenopathy is a    consideration as well.  Sequela from pneumonia or pulmonary edema are    considerations. 2.  Congestive heart failure on a background of chronic pulmonary sequela is    likely given the radiographic findings. 3.  Calcific atherosclerotic disease aorta. Problem List:   Acute on chronic hypoxic/hypercapnic respiratory failure  Right lung consolidation versus malignancy  CKD stage IV  History of MGUS cryoglobulinemia  COVID-19 rule out  Assessment/Plan:     Acute on chronic hypoxic/hypercapnic respiratory failure-reviewed chest x-ray which shows opacity over the right lower lobe ?   Malignancy/radiation changes versus pneumonia. Given the acute presentation of hypoxia-empirically treat with antibiotics, continue cefepime and switch vancomycin to Zyvox in view of renal failure. Hypercapnia noted post intubation-continue on volume assist control mode, RR 20. Repeat ABG. Obtain CT chest to clarify above findings on chest x-ray. Lactate 0.8. WBC 19.6. Rule out COVID-19 infection-patient at risk due to frequent visits for dialysis and also immunosuppressed. D-dimer elevated which is nonspecific. CKD stage IV with MGUS cryoglobulinemia-receiving outpatient plasmaphoresis and Velcade therapy. Creatinine 3.7, which is close to baseline. Monitor. Will consult nephrology. Currently hemodynamically stable, not requiring vasopressors    DC IV heparin drip, start subcutaneous prophylaxis. IV Protonix for GI prophylaxis. Critical care team will follow.   Joe Arredondo MD    Critical care time 32 Min excluding procedures

## 2020-07-16 NOTE — H&P
capsule Take 1 capsule by mouth daily 90 capsule 0    atorvastatin (LIPITOR) 20 MG tablet Take 1 tablet by mouth daily 90 tablet 0    hydrALAZINE (APRESOLINE) 25 MG tablet Take 2 tablets by mouth 3 times daily 540 tablet 5    NIFEdipine (ADALAT CC) 60 MG extended release tablet Take 1 tablet by mouth daily 90 tablet 3    glipiZIDE (GLUCOTROL) 5 MG tablet Take 1 tablet by mouth daily 90 tablet 3    sennosides-docusate sodium (SENOKOT-S) 8.6-50 MG tablet Take 1 tablet by mouth daily q 12h      CRANBERRY CONCENTRATE PO Take by mouth      aspirin EC 81 MG EC tablet Take 1 tablet by mouth daily 90 tablet 1    blood glucose test strips (TRUE METRIX BLOOD GLUCOSE TEST) strip 1 each by In Vitro route daily As needed. 100 each 3    Blood Glucose Monitoring Suppl (TRUE METRIX AIR GLUCOSE METER) GISSELLE Check  Device 3    TRUEPLUS LANCETS 33G MISC 100 each by Does not apply route 2 times daily (with meals) 100 each 5    Blood Glucose Calibration (TRUE METRIX LEVEL 2) Normal SOLN Check weekly 6 each 3    nitroGLYCERIN (NITROSTAT) 0.4 MG SL tablet Place 1 tablet under the tongue every 5 minutes as needed 25 tablet 3    Ascorbic Acid (VITAMIN C) 500 MG tablet Take 500 mg by mouth daily. Allergies:  No Known Allergies     Social History:   reports that he quit smoking about 14 years ago. He has never used smokeless tobacco. He reports current alcohol use of about 2.0 standard drinks of alcohol per week. He reports that he does not use drugs. Family History:  family history includes Emphysema in his father; Heart Disease in his father and mother; High Cholesterol in his sister. ,     Physical Exam:  BP (!) 186/81   Pulse 77   Temp 98.4 °F (36.9 °C)   Resp 21   Wt 160 lb (72.6 kg)   SpO2 100%   BMI 24.47 kg/m²     General appearance:  Appears comfortable. Well nourished  Eyes: Sclera clear, pupils equal  ENT: Moist mucus membranes, no thrush. Trachea midline.   Cardiovascular: Regular rhythm, normal S1, S2. No murmur, gallop, rub. No edema in lower extremities  Respiratory: Clear to auscultation bilaterally, no wheeze, good inspiratory effort  Gastrointestinal: Abdomen soft, non-tender, not distended, normal bowel sounds  Musculoskeletal: No cyanosis in digits, neck supple  Neurology: Intubated and sedated   psychiatry:   Skin: Warm, dry, normal turgor, no rash    Labs:  CBC:   Lab Results   Component Value Date    WBC 19.6 07/16/2020    RBC 3.09 07/16/2020    HGB 8.5 07/16/2020    HCT 27.8 07/16/2020    MCV 90.1 07/16/2020    MCH 27.4 07/16/2020    MCHC 30.4 07/16/2020    RDW 17.8 07/16/2020     07/16/2020    MPV 9.0 07/16/2020     BMP:    Lab Results   Component Value Date     07/16/2020    K 5.3 07/16/2020     07/16/2020    CO2 27 07/16/2020    BUN 47 07/16/2020    CREATININE 3.7 07/16/2020    CALCIUM 9.2 07/16/2020    GFRAA 19 07/16/2020    GFRAA >60 04/01/2013    LABGLOM 16 07/16/2020    GLUCOSE 228 07/16/2020       Chest Xray:   EKG:    I visualized CXR images and EKG strips        Problem List  Active Problems:    Diabetes mellitus type 2 with complications (HCC)    Essential hypertension    Raynaud's disease    Type 2 diabetes mellitus with diabetic nephropathy (HCC)    Chronic renal insufficiency, stage 3 (moderate) (HCC)    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 *        Assessment/Plan:   Acute on chronic hypoxic respiratory failure  -Needing intubation.  -Differential includes pneumonia COVID, fluid overload. -He has multiple risk factors including COPD lung cancer cryoglobulinemia CHF accelerated hypertension  -CT scan noted for pneumonia. COVID. test ordered.  -Continue broad-spectrum antibiotics for now.   Given dose of Lasix in the ER as well  -Blood culture collected await recommendation.  -Remains intubated pulmonary critical consult.  -Patient baseline wears about 2 L of oxygen  -Monitor blood pressure need for any pressors. Elevated d-dimer  -With suspected possible COVID versus PE  -cannot get CTPA due to chronic kidney disease.  -Patient was started on heparin drip in the ER will continue for now    Acute on chronic kidney disease  -With history of lung cancer and prior club anemia.  -Patient gets plasmapheresis treatment and oncology office at [facility]. Last treatment was 714.  -We will ask oncology input if any further treatment needed here. -IV fluid. Monitor. Accelerated hypertension   with blood pressures in the 180s 190s. -Add as needed. Was given dose of Lasix in the ER.         Stephani Vanessa MD    7/16/2020 1:41 PM

## 2020-07-16 NOTE — PROGRESS NOTES
Pharmacy Note  Vancomycin Consult    Mariusz Patel is a 78 y.o. male started on Vancomycin for PNA; consult received from Dr. Paula Landeros to manage therapy. Also receiving the following antibiotics: CEFEPIME. Patient Active Problem List   Diagnosis    Diabetes mellitus type 2 with complications ([facility] [address]    Essential hypertension    Hypercholesteremia    Coronary atherosclerosis    Well adult exam    Raynaud's disease    Urinary frequency    Microalbuminuria    Abdominal aortic aneurysm without rupture (HCC)    Bilateral carotid artery stenosis    Type 2 diabetes mellitus with diabetic nephropathy (HCC)    Vasculogenic erectile dysfunction    Chronic renal insufficiency, stage 3 (moderate) (HCC)    Adjustment disorder with mixed anxiety and depressed mood    BPH (benign prostatic hyperplasia)    Dyslipidemia    Hypokalemia    Malignant neoplasm of upper lobe of lung (HCC)    Falls    Other fatigue    Cryoglobulinemia (HCC)    Acute respiratory failure (HCC)       Allergies:  Patient has no known allergies. Temp max: 98.4    Recent Labs     07/16/20  1045   BUN 47*       Recent Labs     07/16/20  1045   CREATININE 3.7*       Recent Labs     07/16/20  1045   WBC 19.6*       No intake or output data in the 24 hours ending 07/16/20 1635    Culture Date      Source                       Results      Ht Readings from Last 1 Encounters:   01/27/20 5' 7.8\" (1.722 m)        Wt Readings from Last 1 Encounters:   07/16/20 163 lb 3.2 oz (74 kg)         Body mass index is 24.96 kg/m². CrCl cannot be calculated (Unknown ideal weight.). Goal Trough Level: 10-15 mcg/mL    Assessment/Plan:  Will initiate Vancomycin with a one time loading dose of 1000 mg x1, followed by 1000 MG. Next dose will be determined by random level. Timing of trough level will be determined based on culture results, renal function, and clinical response. Thank you for the consult. Will continue to follow.

## 2020-07-16 NOTE — ED PROVIDER NOTES
[facility] Emergency Department    CHIEF COMPLAINT  Chief Complaint   Patient presents with    Respiratory Distress     Pt in by EMS from home. At home therapy was with pt when he began to deteriorate rapidly and became unresponsive. Pt is lethargic, EMS reporting 61% on RA. 92% on NRB upon arrival.      HISTORY OF PRESENT ILLNESS  Keily Liang is a 78 y.o. male  who presents to the ED complaining of concern for acute decompensation of respiratory failure. The patient became unresponsive earlier this morning and having worsening respiratory distress. He had hypoxia in the 50% range in 60% range per EMS and home health visits. Increasing his supplemental home oxygen did not really improve much. Apparently he is physically fairly debilitated but mentally pretty sharp according to 2 sons whom I spoke with on the phone. No reports of any fevers. History is severely limited as the patient is unable to provide any himself and was intubated shortly after arrival.  As such I am able to determine from chart review that he does have a history of lung cancer and cryoglobulinemia and gets plasmapheresis treatments and has severe chronic kidney disease but has not been on dialysis yet. History unobtainable from the patient himself as a result of his acute mental status decline and respiratory failure. No other complaints, modifying factors or associated symptoms. I have reviewed the following from the nursing documentation.     Past Medical History:   Diagnosis Date    CAD (coronary artery disease) 2004    Hyperlipidemia     Hypertension     Type II or unspecified type diabetes mellitus without mention of complication, not stated as uncontrolled      Past Surgical History:   Procedure Laterality Date    CORONARY ANGIOPLASTY WITH STENT PLACEMENT  2004     Family History   Problem Relation Age of Onset    Heart Disease Mother     Emphysema Father     Heart Disease Father    Crys Samayoa mL D5W addavial  15 mg/kg Intravenous Once Elkin Hairston MD        cefepime (MAXIPIME) 2 g IVPB minibag  2 g Intravenous Once Elkin Hairston  mL/hr at 07/16/20 1230 2 g at 07/16/20 1230    furosemide (LASIX) injection 40 mg  40 mg Intravenous Once Elkin Hairston MD        nitroglycerin (NITRO-BID) 2 % ointment 1 inch  1 inch Topical Once Elkin Hairston MD        metoprolol (LOPRESSOR) injection 5 mg  5 mg Intravenous Once Elkin Hairston MD        enoxaparin (LOVENOX) injection 70 mg  1 mg/kg Subcutaneous Once Elkin Hairston MD         Current Outpatient Medications   Medication Sig Dispense Refill    metoprolol tartrate (LOPRESSOR) 50 MG tablet Take 1 tablet by mouth 2 times daily 180 tablet 2    tamsulosin (FLOMAX) 0.4 MG capsule Take 1 capsule by mouth daily 90 capsule 0    atorvastatin (LIPITOR) 20 MG tablet Take 1 tablet by mouth daily 90 tablet 0    hydrALAZINE (APRESOLINE) 25 MG tablet Take 2 tablets by mouth 3 times daily 540 tablet 5    NIFEdipine (ADALAT CC) 60 MG extended release tablet Take 1 tablet by mouth daily 90 tablet 3    glipiZIDE (GLUCOTROL) 5 MG tablet Take 1 tablet by mouth daily 90 tablet 3    sennosides-docusate sodium (SENOKOT-S) 8.6-50 MG tablet Take 1 tablet by mouth daily q 12h      CRANBERRY CONCENTRATE PO Take by mouth      aspirin EC 81 MG EC tablet Take 1 tablet by mouth daily 90 tablet 1    blood glucose test strips (TRUE METRIX BLOOD GLUCOSE TEST) strip 1 each by In Vitro route daily As needed.  100 each 3    Blood Glucose Monitoring Suppl (TRUE METRIX AIR GLUCOSE METER) GISSELLE Check  Device 3    TRUEPLUS LANCETS 33G MISC 100 each by Does not apply route 2 times daily (with meals) 100 each 5    Blood Glucose Calibration (TRUE METRIX LEVEL 2) Normal SOLN Check weekly 6 each 3    nitroGLYCERIN (NITROSTAT) 0.4 MG SL tablet Place 1 tablet under the tongue every 5 minutes as needed 25 tablet 3    Ascorbic Acid (VITAMIN C) 500 MG tablet Take 500 mg by mouth daily. No Known Allergies    REVIEW OF SYSTEMS  10 systems reviewed, pertinent positives per HPI otherwise noted to be negative. PHYSICAL EXAM  BP (!) 186/81   Pulse 77   Temp 98.4 °F (36.9 °C)   Resp 21   Wt 160 lb (72.6 kg)   SpO2 100%   BMI 24.47 kg/m²    GENERAL APPEARANCE: Unwell appearing  GCS DURING INITIAL ASSESSMENT:  Eyes: +4 (spontaneous opening)  Verbal: +2 (incomprehensible sounds)  Motor: +5 (localizes pain)  Total: 11  HENT: Normocephalic. Atraumatic. Mucous membranes are dry. NECK: Supple. EYES: PERRL. EOM's grossly intact. HEART/CHEST: Reg rhythm, reg rate. No murmurs. No chest wall tenderness. LUNGS: Labored, agonal, not protecting airway, rhonchi and rales throughout. ABDOMEN: No tenderness. Soft. Non-distended. No masses. No organomegaly. No guarding or rebound. Normal bowel sounds throughout. MUSCULOSKELETAL: No extremity edema. Compartments soft. No deformity. No tenderness in the extremities. All extremities neurovascularly intact. SKIN: Warm and dry. No acute rashes. NEUROLOGICAL: Limited exam due to GCS 11, moving all extremities. LABS  I have reviewed all labs for this visit.    Results for orders placed or performed during the hospital encounter of 07/16/20   CBC auto differential   Result Value Ref Range    WBC 19.6 (H) 4.0 - 11.0 K/uL    RBC 3.09 (L) 4.20 - 5.90 M/uL    Hemoglobin 8.5 (L) 13.5 - 17.5 g/dL    Hematocrit 27.8 (L) 40.5 - 52.5 %    MCV 90.1 80.0 - 100.0 fL    MCH 27.4 26.0 - 34.0 pg    MCHC 30.4 (L) 31.0 - 36.0 g/dL    RDW 17.8 (H) 12.4 - 15.4 %    Platelets 026 165 - 457 K/uL    MPV 9.0 5.0 - 10.5 fL    Neutrophils % 78.9 %    Lymphocytes % 13.9 %    Monocytes % 6.5 %    Eosinophils % 0.4 %    Basophils % 0.3 %    Neutrophils Absolute 15.4 (H) 1.7 - 7.7 K/uL    Lymphocytes Absolute 2.7 1.0 - 5.1 K/uL    Monocytes Absolute 1.3 0.0 - 1.3 K/uL    Eosinophils Absolute 0.1 0.0 - 0.6 K/uL    Basophils Absolute 0.1 Result Value Ref Range    Color, UA YELLOW Straw/Yellow    Clarity, UA CLOUDY (A) Clear    Glucose, Ur 100 (A) Negative mg/dL    Bilirubin Urine Negative Negative    Ketones, Urine Negative Negative mg/dL    Specific Gravity, UA 1.014 1.005 - 1.030    Blood, Urine Negative Negative    pH, UA 5.0 5.0 - 8.0    Protein,  (A) Negative mg/dL    Urobilinogen, Urine 0.2 <2.0 E.U./dL    Nitrite, Urine Negative Negative    Leukocyte Esterase, Urine Negative Negative    Microscopic Examination YES     Urine Type NotGiven     Urine Reflex to Culture Not Indicated    Lipase   Result Value Ref Range    Lipase 45.0 13.0 - 60.0 U/L   D-dimer, quantitative   Result Value Ref Range    D-Dimer, Quant 958 (H) 0 - 229 ng/mL DDU   Microscopic Urinalysis   Result Value Ref Range    Coarse Casts, UA 0-2 0 - 2 /LPF    Hyaline Casts, UA 6 0 - 8 /LPF    WBC, UA 5 0 - 5 /HPF    RBC, UA 1 0 - 4 /HPF    Epithelial Cells, UA 6 (H) 0 - 5 /HPF   EKG 12 Lead   Result Value Ref Range    Ventricular Rate 110 BPM    Atrial Rate 110 BPM    P-R Interval 160 ms    QRS Duration 90 ms    Q-T Interval 332 ms    QTc Calculation (Bazett) 449 ms    P Axis 88 degrees    R Axis -11 degrees    T Axis 54 degrees    Diagnosis       Sinus tachycardiaPossible Left atrial enlargementBorderline ECGConfirmed by MESSI NASCIMENTO MD ([id number]) on 7/16/2020 11:17:08 AM     The 12 lead EKG was interpreted by me as follows:  Rate: tachycardia with a rate of 110  Rhythm: sinus  Axis: normal  Intervals: normal VA, narrow QRS, normal QTc  ST segments: no LESLEY, some diffuse ST depressions  T waves: no abnormal inversions  Non-specific T wave changes: present  Prior EKG comparison: No prior is currently available for comparison    RADIOLOGY    Xr Chest Portable    Result Date: 7/16/2020  EXAMINATION: ONE XRAY VIEW OF THE CHEST 7/16/2020 11:03 am COMPARISON: Chest 07/14/2006 HISTORY: ORDERING SYSTEM PROVIDED HISTORY: resp fail TECHNOLOGIST PROVIDED HISTORY: Reason for exam:->resp obtained which includes the entire chest and upper portion of the abdomen. This does however revealed the NG tube position as requested. The tube extends along the course of the esophagus with the tip into the left stomach. The distal side port is at the GE junction. Other findings with parenchymal lung disease, right IJ tunneled dialysis catheter, endotracheal tube unchanged. The asymmetric disease in the right mid lung partly overlying the hilum also again noted possible aspiration or pneumonia. Underlying mass or adenopathy not entirely excluded. The NG tube extends into the stomach directed leftward, side hole at the GE junction. ED COURSE/MDM  Patient seen and evaluated. Old records reviewed. Labs and imaging reviewed. After initial evaluation, differential diagnostic considerations included: COVID, acute coronary syndrome, pulmonary embolism, COPD/asthma, pneumonia, sepsis, pericardial tamponade, pneumothorax, CHF, thoracic aortic dissection, anxiety      The patient's ED workup was notable for acute respiratory failure, likely multifactorial including congestive heart failure, accelerated hypertension and sepsis from pneumonia with a history of lung cancer. COVID swab is currently pending. Patient was intubated shortly after arrival due to respiratory failure with GCS of 11 and concern for airway protection as well. He does have a notable leukocytosis. Broad-spectrum antibiotics are initiated. BNP and troponin are both elevated suggesting cardiomyopathy, Lasix and metoprolol were ordered for his significant high blood pressure and further management can be deferred to the inpatient team.  Heparin was also ordered because of his positive d-dimer. Cannot obtain CTPA based on his kidney function, would not want to worsen this and result in unnecessary dialysis at this time. CVC placed. Confirmed FULL CODE when I spoke with the patient's son.     During every aspect of this patient encounter, full droplet plus PPE precautions were used by myself. Intubation Procedure Note    Indication: Respiratory failure    Consent: Unable to be obtained due to the emergent nature of this procedure. Medications Used: etomidate and rocuronium    Procedure: The patient was placed in the appropriate position. Cricoid pressure was not required. Intubation was performed by direct laryngoscopy using Glidescope video technology and a 7.5 cuffed endotracheal tube. Suction of the oropharynx was not required prior to intubation. The cuff was then inflated and the tube was secured appropriately at a distance of 24cm (diminished L sided breath sounds) and adjusted to 22cm to the corner of the mouth. Initial confirmation of placement included bilateral breath sounds, an end tidal CO2 detector, absence of sounds over the stomach, tube fogging, adequate chest rise, adequate pulse oximetry reading and improved skin color. A chest x-ray to verify correct placement of the tube showed appropriate tube position. The patient tolerated the procedure well. Complications: None            Central Line Placement Procedure Note    Indication: vascular access and need for frequent blood draws    Consent: Unable to be obtained due to the emergent nature of this procedure. Location:  The central line was placed in the right femoral vein due to ease of ultrasound accessibility    Procedure: The patient was positioned appropriately and the skin over the right femoral vein was prepped with chloraprep and draped in a sterile fashion. Local anesthesia was obtained by direct infiltration with 1% plain lidocaine. A large bore needle was used to identify the vein under direct visualization and guidance with sterile ultrasound (linear probe on vascular settings). See details of ultrasound component below. A guide wire was then inserted into the vein through the needle.  A triple lumen catheter was then inserted into the vessel over the guide wire using the Seldinger technique. All ports showed good, free flowing blood return and were flushed with saline solution. The catheter was then securely fastened to the skin with sutures and covered with a sterile dressing. A post procedure X-ray was not indicated. The patient tolerated the procedure well. Complications: None    Ultrasound guided central venous access  Indication: need for central access as documented above  Views:  Long access view of target vein: Adequate  Short access view of target vein: Adequate  Adjacent artery: Adequate    Images obtained with findings:   Vein compressible: yes  Needle tip within vein: yes    Impression:   Successful cannulation of central vein: yes     Images obtained by Cherl Player, interpreted by Cherl Player. During the patient's ED course, the patient was given:  Medications   propofol injection (25 mcg/kg/min × 72.6 kg Intravenous Rate/Dose Change 7/16/20 1248)   vancomycin 1000 mg IVPB in 250 mL D5W addavial (has no administration in time range)   cefepime (MAXIPIME) 2 g IVPB minibag (2 g Intravenous New Bag 7/16/20 1230)   furosemide (LASIX) injection 40 mg (has no administration in time range)   nitroglycerin (NITRO-BID) 2 % ointment 1 inch (has no administration in time range)   metoprolol (LOPRESSOR) injection 5 mg (has no administration in time range)   enoxaparin (LOVENOX) injection 70 mg (has no administration in time range)   rocuronium (ZEMURON) injection 100 mg (100 mg Intravenous Given 7/16/20 1057)   etomidate (AMIDATE) injection 20 mg (20 mg Intravenous Given 7/16/20 1056)        CLINICAL IMPRESSION  1. Pneumonia due to organism    2. Suspected COVID-19 virus infection    3. Acute on chronic congestive heart failure, unspecified heart failure type ([address]    4. Acute respiratory failure with hypoxia (HCC)    5.  Accelerated hypertension        Blood pressure (!) 186/81, pulse 77, temperature 98.4 °F (36.9 °C), resp. rate 21, weight 160 lb (72.6 kg), SpO2 100 %. [address] was admitted in critical condition. The plan is to admit to the hospital at this time under the hospitalist service. Dr. Ronald Dickson accepted the patient and will take over the patient's care. The total critical care time spent while evaluating and treating this patient was at least 80 minutes. This excludes time spent doing separately billable procedures. This includes time at the bedside, data interpretation, medication management, obtaining critical history from collateral sources if the patient is unable to provide it directly, and physician consultation. Specifics of interventions taken and potentially life-threatening diagnostic considerations are listed above in the medical decision making. DISCLAIMER: This chart was created using Dragon dictation software. Efforts were made by me to ensure accuracy, however some errors may be present due to limitations of this technology and occasionally words are not transcribed correctly.         Olivier Harris MD  07/16/20 0020

## 2020-07-17 ENCOUNTER — APPOINTMENT (OUTPATIENT)
Dept: GENERAL RADIOLOGY | Age: 80
DRG: 208 | End: 2020-07-17
Payer: MEDICARE

## 2020-07-17 ENCOUNTER — APPOINTMENT (OUTPATIENT)
Dept: CT IMAGING | Age: 80
DRG: 208 | End: 2020-07-17
Payer: MEDICARE

## 2020-07-17 ENCOUNTER — TELEPHONE (OUTPATIENT)
Dept: FAMILY MEDICINE CLINIC | Age: 80
End: 2020-07-17

## 2020-07-17 LAB
A/G RATIO: 1.8 (ref 1.1–2.2)
ABO/RH: NORMAL
ALBUMIN SERPL-MCNC: 3.4 G/DL (ref 3.4–5)
ALP BLD-CCNC: 69 U/L (ref 40–129)
ALT SERPL-CCNC: 64 U/L (ref 10–40)
ANION GAP SERPL CALCULATED.3IONS-SCNC: 9 MMOL/L (ref 3–16)
ANTIBODY SCREEN: NORMAL
APTT: 28.6 SEC (ref 24.2–36.2)
AST SERPL-CCNC: 35 U/L (ref 15–37)
BASOPHILS ABSOLUTE: 0 K/UL (ref 0–0.2)
BASOPHILS RELATIVE PERCENT: 0.3 %
BILIRUB SERPL-MCNC: <0.2 MG/DL (ref 0–1)
BLOOD BANK DISPENSE STATUS: NORMAL
BLOOD BANK PRODUCT CODE: NORMAL
BPU ID: NORMAL
BUN BLDV-MCNC: 50 MG/DL (ref 7–20)
CALCIUM SERPL-MCNC: 9.5 MG/DL (ref 8.3–10.6)
CHLORIDE BLD-SCNC: 104 MMOL/L (ref 99–110)
CO2: 27 MMOL/L (ref 21–32)
CREAT SERPL-MCNC: 3.8 MG/DL (ref 0.8–1.3)
D DIMER: 577 NG/ML DDU (ref 0–229)
DESCRIPTION BLOOD BANK: NORMAL
EOSINOPHILS ABSOLUTE: 0 K/UL (ref 0–0.6)
EOSINOPHILS RELATIVE PERCENT: 0.1 %
ESTIMATED AVERAGE GLUCOSE: 102.5 MG/DL
FIBRINOGEN: 347 MG/DL (ref 200–397)
GFR AFRICAN AMERICAN: 19
GFR NON-AFRICAN AMERICAN: 15
GLOBULIN: 1.9 G/DL
GLUCOSE BLD-MCNC: 150 MG/DL (ref 70–99)
GLUCOSE BLD-MCNC: 150 MG/DL (ref 70–99)
GLUCOSE BLD-MCNC: 153 MG/DL (ref 70–99)
GLUCOSE BLD-MCNC: 158 MG/DL (ref 70–99)
GLUCOSE BLD-MCNC: 161 MG/DL (ref 70–99)
GLUCOSE BLD-MCNC: 169 MG/DL (ref 70–99)
GLUCOSE BLD-MCNC: 184 MG/DL (ref 70–99)
HBA1C MFR BLD: 5.2 %
HCT VFR BLD CALC: 20.8 % (ref 40.5–52.5)
HCT VFR BLD CALC: 24.4 % (ref 40.5–52.5)
HEMOGLOBIN: 7 G/DL (ref 13.5–17.5)
HEMOGLOBIN: 8.1 G/DL (ref 13.5–17.5)
INR BLD: 0.99 (ref 0.86–1.14)
L. PNEUMOPHILA SEROGP 1 UR AG: NORMAL
LYMPHOCYTES ABSOLUTE: 0.8 K/UL (ref 1–5.1)
LYMPHOCYTES RELATIVE PERCENT: 14 %
MCH RBC QN AUTO: 28.5 PG (ref 26–34)
MCHC RBC AUTO-ENTMCNC: 33.4 G/DL (ref 31–36)
MCV RBC AUTO: 85.6 FL (ref 80–100)
MONOCYTES ABSOLUTE: 0.3 K/UL (ref 0–1.3)
MONOCYTES RELATIVE PERCENT: 4.6 %
NEUTROPHILS ABSOLUTE: 4.9 K/UL (ref 1.7–7.7)
NEUTROPHILS RELATIVE PERCENT: 81 %
PDW BLD-RTO: 17.8 % (ref 12.4–15.4)
PERFORMED ON: ABNORMAL
PLATELET # BLD: 344 K/UL (ref 135–450)
PMV BLD AUTO: 10.9 FL (ref 5–10.5)
POTASSIUM REFLEX MAGNESIUM: 4.1 MMOL/L (ref 3.5–5.1)
PROTHROMBIN TIME: 11.5 SEC (ref 10–13.2)
RBC # BLD: 2.43 M/UL (ref 4.2–5.9)
SODIUM BLD-SCNC: 140 MMOL/L (ref 136–145)
STREP PNEUMONIAE ANTIGEN, URINE: NORMAL
TOTAL PROTEIN: 5.3 G/DL (ref 6.4–8.2)
WBC # BLD: 6.1 K/UL (ref 4–11)

## 2020-07-17 PROCEDURE — 6360000002 HC RX W HCPCS: Performed by: HOSPITALIST

## 2020-07-17 PROCEDURE — 84155 ASSAY OF PROTEIN SERUM: CPT

## 2020-07-17 PROCEDURE — 82595 ASSAY OF CRYOGLOBULIN: CPT

## 2020-07-17 PROCEDURE — 2580000003 HC RX 258: Performed by: HOSPITALIST

## 2020-07-17 PROCEDURE — 2000000000 HC ICU R&B

## 2020-07-17 PROCEDURE — 86850 RBC ANTIBODY SCREEN: CPT

## 2020-07-17 PROCEDURE — 86900 BLOOD TYPING SEROLOGIC ABO: CPT

## 2020-07-17 PROCEDURE — 86923 COMPATIBILITY TEST ELECTRIC: CPT

## 2020-07-17 PROCEDURE — 71045 X-RAY EXAM CHEST 1 VIEW: CPT

## 2020-07-17 PROCEDURE — 86255 FLUORESCENT ANTIBODY SCREEN: CPT

## 2020-07-17 PROCEDURE — 94003 VENT MGMT INPAT SUBQ DAY: CPT

## 2020-07-17 PROCEDURE — 6360000002 HC RX W HCPCS: Performed by: INTERNAL MEDICINE

## 2020-07-17 PROCEDURE — 94770 HC ETCO2 MONITOR DAILY: CPT

## 2020-07-17 PROCEDURE — 84165 PROTEIN E-PHORESIS SERUM: CPT

## 2020-07-17 PROCEDURE — 99291 CRITICAL CARE FIRST HOUR: CPT | Performed by: INTERNAL MEDICINE

## 2020-07-17 PROCEDURE — 85730 THROMBOPLASTIN TIME PARTIAL: CPT

## 2020-07-17 PROCEDURE — 36592 COLLECT BLOOD FROM PICC: CPT

## 2020-07-17 PROCEDURE — 85384 FIBRINOGEN ACTIVITY: CPT

## 2020-07-17 PROCEDURE — 85014 HEMATOCRIT: CPT

## 2020-07-17 PROCEDURE — 86334 IMMUNOFIX E-PHORESIS SERUM: CPT

## 2020-07-17 PROCEDURE — 85610 PROTHROMBIN TIME: CPT

## 2020-07-17 PROCEDURE — 85018 HEMOGLOBIN: CPT

## 2020-07-17 PROCEDURE — 85025 COMPLETE CBC W/AUTO DIFF WBC: CPT

## 2020-07-17 PROCEDURE — 36430 TRANSFUSION BLD/BLD COMPNT: CPT

## 2020-07-17 PROCEDURE — 2700000000 HC OXYGEN THERAPY PER DAY

## 2020-07-17 PROCEDURE — P9016 RBC LEUKOCYTES REDUCED: HCPCS

## 2020-07-17 PROCEDURE — 80053 COMPREHEN METABOLIC PANEL: CPT

## 2020-07-17 PROCEDURE — 71250 CT THORAX DX C-: CPT

## 2020-07-17 PROCEDURE — 85810 BLOOD VISCOSITY EXAMINATION: CPT

## 2020-07-17 PROCEDURE — 6370000000 HC RX 637 (ALT 250 FOR IP): Performed by: HOSPITALIST

## 2020-07-17 PROCEDURE — 6370000000 HC RX 637 (ALT 250 FOR IP): Performed by: INTERNAL MEDICINE

## 2020-07-17 PROCEDURE — 85379 FIBRIN DEGRADATION QUANT: CPT

## 2020-07-17 PROCEDURE — 83036 HEMOGLOBIN GLYCOSYLATED A1C: CPT

## 2020-07-17 PROCEDURE — 6360000002 HC RX W HCPCS

## 2020-07-17 PROCEDURE — 86901 BLOOD TYPING SEROLOGIC RH(D): CPT

## 2020-07-17 PROCEDURE — 94761 N-INVAS EAR/PLS OXIMETRY MLT: CPT

## 2020-07-17 PROCEDURE — C9113 INJ PANTOPRAZOLE SODIUM, VIA: HCPCS | Performed by: INTERNAL MEDICINE

## 2020-07-17 RX ORDER — DEXAMETHASONE 4 MG/1
TABLET ORAL
Status: COMPLETED
Start: 2020-07-17 | End: 2020-07-17

## 2020-07-17 RX ORDER — HYDRALAZINE HYDROCHLORIDE 25 MG/1
25 TABLET, FILM COATED ORAL 3 TIMES DAILY
Status: DISCONTINUED | OUTPATIENT
Start: 2020-07-17 | End: 2020-07-18

## 2020-07-17 RX ORDER — 0.9 % SODIUM CHLORIDE 0.9 %
250 INTRAVENOUS SOLUTION INTRAVENOUS ONCE
Status: COMPLETED | OUTPATIENT
Start: 2020-07-17 | End: 2020-07-17

## 2020-07-17 RX ADMIN — HYDRALAZINE HYDROCHLORIDE 25 MG: 25 TABLET, FILM COATED ORAL at 20:49

## 2020-07-17 RX ADMIN — DEXAMETHASONE 6 MG: 4 TABLET ORAL at 08:58

## 2020-07-17 RX ADMIN — HYDRALAZINE HYDROCHLORIDE 50 MG: 25 TABLET, FILM COATED ORAL at 08:44

## 2020-07-17 RX ADMIN — FUROSEMIDE 20 MG: 10 INJECTION, SOLUTION INTRAMUSCULAR; INTRAVENOUS at 08:45

## 2020-07-17 RX ADMIN — INSULIN LISPRO 1 UNITS: 100 INJECTION, SOLUTION INTRAVENOUS; SUBCUTANEOUS at 12:08

## 2020-07-17 RX ADMIN — INSULIN LISPRO 1 UNITS: 100 INJECTION, SOLUTION INTRAVENOUS; SUBCUTANEOUS at 16:45

## 2020-07-17 RX ADMIN — INSULIN LISPRO 1 UNITS: 100 INJECTION, SOLUTION INTRAVENOUS; SUBCUTANEOUS at 20:47

## 2020-07-17 RX ADMIN — PROPOFOL 20 MCG/KG/MIN: 10 INJECTION, EMULSION INTRAVENOUS at 13:10

## 2020-07-17 RX ADMIN — ASPIRIN 81 MG: 81 TABLET, COATED ORAL at 08:44

## 2020-07-17 RX ADMIN — NIFEDIPINE 60 MG: 30 TABLET, EXTENDED RELEASE ORAL at 08:45

## 2020-07-17 RX ADMIN — METOPROLOL TARTRATE 50 MG: 50 TABLET, FILM COATED ORAL at 20:49

## 2020-07-17 RX ADMIN — Medication 10 ML: at 08:46

## 2020-07-17 RX ADMIN — Medication 500 MG: at 08:44

## 2020-07-17 RX ADMIN — SODIUM CHLORIDE 250 ML: 9 INJECTION, SOLUTION INTRAVENOUS at 08:47

## 2020-07-17 RX ADMIN — LINEZOLID 600 MG: 600 INJECTION, SOLUTION INTRAVENOUS at 17:22

## 2020-07-17 RX ADMIN — INSULIN LISPRO 1 UNITS: 100 INJECTION, SOLUTION INTRAVENOUS; SUBCUTANEOUS at 01:14

## 2020-07-17 RX ADMIN — Medication 10 ML: at 20:50

## 2020-07-17 RX ADMIN — CEFEPIME HYDROCHLORIDE 1 G: 1 INJECTION, POWDER, FOR SOLUTION INTRAMUSCULAR; INTRAVENOUS at 00:57

## 2020-07-17 RX ADMIN — INSULIN LISPRO 1 UNITS: 100 INJECTION, SOLUTION INTRAVENOUS; SUBCUTANEOUS at 08:50

## 2020-07-17 RX ADMIN — PANTOPRAZOLE SODIUM 40 MG: 40 INJECTION, POWDER, FOR SOLUTION INTRAVENOUS at 08:45

## 2020-07-17 RX ADMIN — ATORVASTATIN CALCIUM 20 MG: 20 TABLET, FILM COATED ORAL at 08:45

## 2020-07-17 RX ADMIN — CEFEPIME HYDROCHLORIDE 1 G: 1 INJECTION, POWDER, FOR SOLUTION INTRAMUSCULAR; INTRAVENOUS at 13:01

## 2020-07-17 RX ADMIN — TAMSULOSIN HYDROCHLORIDE 0.4 MG: 0.4 CAPSULE ORAL at 08:45

## 2020-07-17 RX ADMIN — PROPOFOL 25 MCG/KG/MIN: 10 INJECTION, EMULSION INTRAVENOUS at 03:37

## 2020-07-17 RX ADMIN — LINEZOLID 600 MG: 600 INJECTION, SOLUTION INTRAVENOUS at 05:43

## 2020-07-17 RX ADMIN — HYDRALAZINE HYDROCHLORIDE 10 MG: 20 INJECTION INTRAMUSCULAR; INTRAVENOUS at 00:57

## 2020-07-17 RX ADMIN — INSULIN LISPRO 1 UNITS: 100 INJECTION, SOLUTION INTRAVENOUS; SUBCUTANEOUS at 05:45

## 2020-07-17 ASSESSMENT — PAIN SCALES - GENERAL
PAINLEVEL_OUTOF10: 0

## 2020-07-17 ASSESSMENT — PULMONARY FUNCTION TESTS
PIF_VALUE: 27
PIF_VALUE: 29
PIF_VALUE: 26
PIF_VALUE: 23

## 2020-07-17 NOTE — PLAN OF CARE
Nutrition Problem #1: Inadequate protein intake  Intervention: Food and/or Nutrient Delivery: Start Tube Feeding  Nutritional Goals: Diet advancement vs nutrition support

## 2020-07-17 NOTE — CONSULTS
[address]                     [facility], [address]                                  CONSULTATION    PATIENT NAME: Shreyas Vinson                 :        1940  MED REC NO:   [id number]                          ROOM:       [id number]  ACCOUNT NO:   [de-identified]                           ADMIT DATE: 2020  PROVIDER:     Ella Ramirez MD    RENAL CONSULTATION    CONSULT DATE:  2020    CONSULTING PHYSICIAN:  Ella Ramirez MD    REFERRING PROVIDER:  Orestes Moraels MD    REASON FOR CONSULTATION:  Acute kidney injury on CKD IV. 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 He is currently intubated. His COVID testing is pending. I  am asked to see the patient with his underlying kidney disease and some  elevation in his creatinine. His potassium is also slightly elevated at  5.3. Today, creatinine is 3.8 with a potassium of 4.1. During initial  presentation, his systolic blood pressure was elevated, but has now  decreased. Most of this information is from records since the patient  is currently on the ventilator. For the cryoglobulinemia, he has been  undergoing plasma exchange. He has a tunneled catheter in the anterior  chest wall. PAST MEDICAL HISTORY:  Includes CKD IV, MGUS with cryoglobulinemia,  coronary artery disease, hyperlipidemia, hypertension, diabetes  mellitus. ALLERGIES:  None. MEDICATIONS PRIOR TO ADMISSION:  Includes glipizide, Senokot,  nitroglycerin p.r.n., metoprolol, tamsulosin, atorvastatin, hydralazine,  nifedipine, aspirin, ascorbic acid. SOCIAL HISTORY:  Quit smoking 14 years ago.   Uses alcohol occasionally. No drug abuse. FAMILY HISTORY:  Includes emphysema, heart disease and hyperlipidemia. REVIEW OF SYSTEMS:  Unable to cooperate. PHYSICAL EXAMINATION:  VITAL SIGNS:  Blood pressure 123/49, pulse 59, respiration 20,  temperature 98.2, saturating 100%, FiO2 of 40%. Urine output recorded  at 1500 mL, negative 450 mL since admission. GENERAL:  On the vent. HEENT:  Anicteric sclerae, clear conjunctivae. SKIN:  Anicteric, no rash. CHEST:  Coarse bilaterally. HEART:  Regular. ABDOMEN:  Soft, nontender. No rebound or involuntary guarding. EXTREMITIES:  Show no edema. LABORATORY DATA:  Sodium 140, potassium 4.1, chloride 104, bicarb 27,  BUN 50, creatinine 3.8, glucose 158, calcium 9.5. CK 49. . ProBNP 6218. Troponin 0.04. Albumin 3.4. WBC 6.1, hemoglobin 7,  hematocrit 20.8, platelet count 487,081. INR 0.99. Blood cultures are  pending. COVID-19 testing is pending. Urinalysis, specific gravity  1.014, pH 5, glucose 100, protein 100, wbc is 5. DIAGNOSTIC DATA:  Chest x-ray shows improvement in appearance of the  chest with some clearing of the bibasilar opacities, large amount of  pneumonia remains in the lower right lung, small amount of opacity  remains in the left retrocardiac area, small  bilateral pleural effusion. ASSESSMENT AND PLAN:  1. Acute kidney injury on CKD IV, baseline creatinine 3.4. Follows  with  Nephrology. Renal function is relatively stable today and he is  nonoliguric. With lower blood pressures, I would decrease the  hydralazine to 25 mg p.o. three times a day with holding parameters. No  need for acute renal replacement therapy at this time, but would need  close followup. 2.  Hyperkalemia, improved. 3.  Hypertension. Blood pressure was very elevated on initial  presentation; however, has now improved. We will put holding parameters  on blood pressure meds. 4.  MGUS with cryoglobulinemia. Hematology has been consulted.   We will  touch base with Hematology about the need for continuing the  plasmapheresis for now. 5.  Anemia. Hematology is following. 6.  Acute respiratory failure, currently on the vent. Management as per  the ICU team.  7.  Diabetes mellitus. Management as per Medicine. Thank you very much for this consult. We will follow with you.         Benoit Pate MD    D: 07/17/2020 12:26:26       T: 07/17/2020 12:36:30     [email or web address]  Job#: [id number]     Doc#: [id number]    CC:

## 2020-07-17 NOTE — PROGRESS NOTES
Patient assessment completed, see doc flow sheets. Sedation vacation completed. Patient able to open eyes, track, and follow commands within a few minutes. Morning labs collected via central line. See page to hospitalist with new orders for one unit of blood. Call placed to wife for consent. Message left-will await a call before blood transfer. Gina and oral care completed, bed pad changed, and patient repositioned in bed. No further needs at this time. Will continue to monitor. Received panic from lab from hemoglobin 7 and hematocrit 20.8 (yesterday 8.5/27.5) was on heparin gtt yesterday for elevated d-dimer but Pulm switched to subq injections. Had intermittent bloody oral secretions when suctioning and NG secretions but NG suction off. Patient is pale looking. Vital signs stable on no pressure support. Would you like to order blood or check H/H later in day? Please advise. Thanks!

## 2020-07-17 NOTE — CONSULTS
Renal Consult [id number]  1. MARANDA on CKD 4 - baseline crea 3.4. Crea currenty 3.8. Non-oliguric . UA+protein and glucose but neg blood. 2.  H/O MGUS with Cryoglobulinemia- receiving PLEX from [facility]. Reviewed Dr. Magnolia Trujillo note, no need for PLEX today. Awaiting W/U.   3.  Resp. Failure  4. H/O CAD  5. H/O HTN- bp now lower. Decrease Hydralazine dose. Holding parameters for bp meds. 6.  H/O DM  7. Hyperkalemia- better    Thank you. High risk.

## 2020-07-17 NOTE — PROGRESS NOTES
Patient extubated with RN and RT at bedside. Patient tolerated well. Placed on 3L via nasal canula. Will continue to monitor and assess.      Electronically signed by KATHY Genao, RN

## 2020-07-17 NOTE — PROGRESS NOTES
Comprehensive Nutrition Assessment    Type and Reason for Visit:  Initial(RD triggered d/t pt on vent)    Nutrition Recommendations/Plan:   1. Recommend EN support to begin within 24-48 hours best practice. 2. Recommend Nepro (renal formula) at initial goal rate 45 mL per hour to provide 1080 mL total volume, 1944 calories, 87 grams protein, 785 mL free water. 3. Recommend water bolus 175 mL q 6 hours, or defer to nephrology. 4. Ensure head of bed is 30 - 45 degrees during continuous or bolus gastric feeding and for one hour after bolus. Turn off the feeding if head of bed is lowered less than 30 degrees. 5. Monitor for tolerance (residuals greater than 500 mL, bowel habits, N/V, cramping). Nutrition Assessment:  Pt is nutritionally compromised as evidenced by NPO on mechanical ventilation. Propofol infusing at 8.7 mL per hour to provide 230 calories from fat daily. Pt's weight has been stable per hx in EMR. Note plans for possible extubation today per ICU team. If unable to extubate, recommend EN support to begin within 24-48 hours best practice. See above for recommendations. Malnutrition Assessment:  Malnutrition Status:  Insufficient data(Droplet plus isolation)    Estimated Daily Nutrient Needs:  Energy (kcal):  1343-4070; Weight Used for Energy Requirements:  Current(73 kg)     Protein (g):   grams; Weight Used for Protein Requirements:  Current(73 kg; 1.2-2 grams per kg)        Fluid (ml/day):  1 mL per kcal     Nutrition Related Findings:  Trace generalized, BUE and BLE edema. Wounds:  None       Current Nutrition Therapies:    Diet NPO Effective Now    Anthropometric Measures:  · Height: 5' 7.8\" (172.2 cm)  · Current Body Weight: 161 lb (73 kg)   · Admission Body Weight: 163 lb (73.9 kg)     · Ideal Body Weight: 153 lbs; 105.2 lbs   · BMI: 24.6  · BMI Categories: Normal Weight (BMI 18.5-24. 9)       Nutrition Diagnosis:   · Inadequate protein intake related to impaired respiratory funtion as evidenced by intubation      Nutrition Interventions:   Food and/or Nutrient Delivery:  Start Tube Feeding  Nutrition Education/Counseling:  Education not indicated   Coordination of Nutrition Care:  Continued Inpatient Monitoring    Goals:  Diet advancement vs nutrition support       Nutrition Monitoring and Evaluation:   Food/Nutrient Intake Outcomes:  Diet Advancement/Tolerance    Discharge Planning:     Too soon to determine     Electronically signed by Serjio Ortiz RD, LD on 7/17/20 at 1:09 PM EDT    Contact: [phone]

## 2020-07-17 NOTE — PROGRESS NOTES
at 7/17/2020 0620  Gross per 24 hour   Intake 1347.2 ml   Output 1800 ml   Net -452.8 ml      Wt Readings from Last 3 Encounters:   07/17/20 161 lb 12.8 oz (73.4 kg)   01/27/20 164 lb 6.4 oz (74.6 kg)   01/06/20 167 lb 3.2 oz (75.8 kg)       Physical exam deferred d/t COVID 19 r/o      Labs and Tests:  CBC:   Recent Labs     07/16/20  1045 07/17/20  0455   WBC 19.6* 6.1   HGB 8.5* 7.0*    344     BMP:    Recent Labs     07/16/20  1045 07/17/20  0455    140   K 5.3* 4.1    104   CO2 27 27   BUN 47* 50*   CREATININE 3.7* 3.8*   GLUCOSE 228* 158*     Hepatic:   Recent Labs     07/16/20  1045 07/17/20  0455   * 35   * 64*   BILITOT <0.2 <0.2   ALKPHOS 100 69       ASSESSMENT AND PLAN    Active Problems:    Diabetes mellitus type 2 with complications ([facility] [address]    Essential hypertension    Raynaud's disease    Type 2 diabetes mellitus with diabetic nephropathy (HCC)    Chronic renal insufficiency, stage 3 (moderate) (HCC)    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 *      1. Type I cryoglobulinemia:     -This was diagnosed in Mar / Apr 2020 in [facility] after getting evaluated for worsening renal failure. -Immunofixation showed cryoglobulins  -Bone marrow aspiration and biopsy showed 5 to 10% plasma cells  -Cryoglobulins were 1% and cryo crit was 1.5% in April 2020  -The patient was started on plasmapheresis treatment in June 2020  -He was also started on Velcade and dexamethasone in weekly fashion.   Most recent treatment was done on 7/14/2020  -According to the son, the patient was supposed to get plasmapheresis on 7/16/2020  - Quantitative immunoglobulins obtained on 7/2/2020 showed IgG of 855 and IgM of 93-normal kappa lambda ratio was normal 1.36     -Dr. Aaron Rivera to discuss patient's case with his primary oncologist Dr. Kirk Angelucci at [facility]  -Meanwhile we will check serum viscosity, cryoglobulin and   cryocrit  -No urgent indication for plasmapheresis.     2. History of squamous cell carcinoma of the lung:     -Status post definitive radiation - SBRT -in January 2020  -CT chest done at [facility] in late April 2020 showed positive response.  -Currently being monitored     3. Anemia:     -Multifactorial  -There was no evidence of iron or B12 deficiency on the blood work done at [facility]  -Transfuse PRBCs if hemoglobin is less than 7.  - hgb 7.0 this morning, currently receiving transfusion     3. Respiratory failure:     -On broad-spectrum antibiotics  -Management per primary team.     Patient's other issues include     4. Chronic kidney disease     5. History of ANCA positive vasculitis     6. Coronary artery disease     7. Diabetes       Lilliana Trujillo, CNP  [facility]    Events noted. Discussed with RN    I spoke to Dr. Alban Lacey ( [phone]) from [facility] -according to him patient was not responding to plasmapheresis and he was thinking of holding plasmapheresis. Creatinine did get better slightly but there was no remarkable improvement. His plan was to obtain CT chest to assess the status of the lung cancer and plan was to discuss with the family about best supportive care due to multiple medical issues. Patient will be getting CT chest later on today. Cryoglobulin work-up is pending. Depending on CT chest, will make further decisions. Continue current care for now.     Daryle Kelch, MD

## 2020-07-17 NOTE — PROGRESS NOTES
Shift assessment completed. See complex assessment in doc flowsheet. Pt is currently sedated on the ventilator with propofol and fentanyl gtt's. No need for BP support at this time. See MAR for dosages. NSR/SB on the monitor. Lungs diminished. ETT 7.5 and 25 cm @ the lip. Vent settings AC 20//FIO2 40/peep 5. Abd is soft with +bs x4 quadrants. OGT @ 56 cm @ the lip and connected to ILWS. Placement verified by air bolus. Clear/brown drainage noted. Han is patent and draining clear yellow urine. Oral care provided and pt repositioned for comfort. Bed in lowest position, wheels locked, and alarm active. Page to hospitalist as follows. See new orders. Patient was admitted today for PE vs Covid R/O and resp. Distress was tubed in ED. Currently on 50mcg/kg/hr of Propofol. No fentanyl support. Grimaces when touched and responds to pain. Didn't know if you wanted to add Fentanyl? BP elevated 193/64 map 99 and 200/71 map 104. Appears no one restarted home medications especially BP meds. Takes Hydralazine 25mg 2tablets 3times a day. Metoprolol 50mg 1 tablet twice a day (HR 50/60s). Nifedipine 60mg extended release tablet once daily. Didn't know if you wanted to restart or given something IV? Please advise. Thanks!

## 2020-07-17 NOTE — CONSULTS
[facility]   Consult Note      Reason for Consult:  Cryoglobulinemia    Requesting Physician:  Dr. Ja Mott:  Respiratory failure    History Obtained From: Chart and son    HISTORY OF PRESENT ILLNESS:      75-year-old gentleman who has history of squamous cell carcinoma of the lung, type I cryoglobulinemia and multiple other medical problems is admitted in the hospital on 7/16/2016 with respiratory distress. He is intubated and on ventilator. He has been started on broad-spectrum antibiotics for possible pneumonia. Patient has following Hem Onc issues    -History of squamous cell carcinoma of the lung-stage II status post radiation treatment. Currently being monitored without any progression of the disease. Treatment was done at [facility].    -He was diagnosed with type I cryoglobulinemia in May 2020 after getting evaluated for rapidly worsening kidney function. Immunofixation was suggestive of type I cryoglobulinemia. Blood showed cryoglobulins 1% and cryo crit was 1.5%. Bone marrow aspiration and biopsy done in May 2020 showed evidence of 5 to 10% plasma cells. The patient was evaluated by Dr. Maida Wright of oncology and was started on plasmapheresis in early June 2020. He might have had for plasmapheresis treatments so far. He also has been started on weekly Velcade and dexamethasone treatments. Most recent treatment was done on 7/14/2020. Past Medical History:     has a past medical history of CAD (coronary artery disease), Hyperlipidemia, Hypertension, and Type II or unspecified type diabetes mellitus without mention of complication, not stated as uncontrolled.    Past Surgical History:    Past Surgical History:   Procedure Laterality Date    CORONARY ANGIOPLASTY WITH STENT PLACEMENT  2004      Current Medications:    Current Facility-Administered Medications   Medication Dose Route Frequency Provider Last Rate Last Dose    propofol injection  10 mcg/kg/min Intravenous Titrated Ary Denver, MD 21.8 mL/hr at 07/16/20 1711 50 mcg/kg/min at 07/16/20 1711    heparin (porcine) injection 5,810 Units  80 Units/kg Intravenous PRN Ary Denver, MD        heparin (porcine) injection 2,900 Units  40 Units/kg Intravenous PRN Vadim Mortensen MD        sodium chloride flush 0.9 % injection 10 mL  10 mL Intravenous 2 times per day Ary Denver, MD   10 mL at 07/16/20 2009    sodium chloride flush 0.9 % injection 10 mL  10 mL Intravenous PRN Ayesha Limon MD        acetaminophen (TYLENOL) tablet 650 mg  650 mg Oral Q6H PRN Ary Denver, MD        Or   Deanne Baldwin acetaminophen (TYLENOL) suppository 650 mg  650 mg Rectal Q6H PRN Ary Denver, MD        polyethylene glycol (GLYCOLAX) packet 17 g  17 g Oral Daily PRN Ary Denver, MD        promethazine (PHENERGAN) tablet 12.5 mg  12.5 mg Oral Q6H PRN Ary Denver, MD        Or    ondansetron (ZOFRAN) injection 4 mg  4 mg Intravenous Q6H PRN Ary Denver, MD Sondra Arch [START ON 7/17/2020] furosemide (LASIX) injection 20 mg  20 mg Intravenous Daily Ayesha Limon MD        acetaminophen (TYLENOL) tablet 650 mg  650 mg Oral Q6H PRN Ayesha Limon MD        Or   Deanne Baldwin acetaminophen (TYLENOL) suppository 650 mg  650 mg Rectal Q6H PRN Ayesha Limon MD        dexamethasone (DECADRON) tablet 6 mg  6 mg Oral Daily Ayesha Limon MD   6 mg at 07/16/20 1802    glucose (GLUTOSE) 40 % oral gel 15 g  15 g Oral PRN Ayesha Limon MD        dextrose 50 % IV solution  12.5 g Intravenous PRN Ary Denver, MD        glucagon (rDNA) injection 1 mg  1 mg Intramuscular PRN Ary Denver, MD        dextrose 5 % solution  100 mL/hr Intravenous PRN Ary Denver, MD Sondra Arch [START ON 7/17/2020] cefepime (MAXIPIME) 1 g IVPB minibag  1 g Intravenous Q12H Ayesha Limon MD        linezolid (ZYVOX) IVPB 600 mg  600 mg Intravenous Q12H Delon Espinosa MD   Stopped at 07/16/20 1900    heparin (porcine) injection 5,000 Units  5,000 Units requested. The tube extends along the course of the esophagus with the tip into the left stomach. The distal side port is at the GE junction. Other findings with parenchymal lung disease, right IJ tunneled dialysis catheter, endotracheal tube unchanged. The asymmetric disease in the right mid lung partly overlying the hilum also again noted possible aspiration or pneumonia. Underlying mass or adenopathy not entirely excluded. The NG tube extends into the stomach directed leftward, side hole at the GE junction. Assessment & Plan:    47-year-old gentleman who has multiple medical problems and is admitted in the hospital with respiratory failure, currently on ventilator. He has following heme-onc issues    1. Type I cryoglobulinemia:    -This was diagnosed in Mar / Apr 2020 in [facility] after getting evaluated for worsening renal failure. -Immunofixation showed cryoglobulins  -Bone marrow aspiration and biopsy showed 5 to 10% plasma cells  -Cryoglobulins were 1% and cryo crit was 1.5% in April 2020  -The patient was started on plasmapheresis treatment in June 2020  -He was also started on Velcade and dexamethasone in weekly fashion. Most recent treatment was done on 7/14/2020  -According to the son, the patient was supposed to get plasmapheresis today  - Quantitative immunoglobulins obtained on 7/2/2020 showed IgG of 855 and IgM of 93-normal kappa lambda ratio was normal 1.36    -I will discuss patient's case with his primary oncologist Dr. Joanna Tyson at [facility]  -Meanwhile we will check serum viscosity, cryoglobulin and   cryocrit  -I do not see urgent indication for plasmapheresis. 2.  History of squamous cell carcinoma of the lung:    -Status post definitive radiation - SBRT -in January 2020  -CT chest done at [facility] in late April 2020 showed positive response.  -Currently being monitored    3.  Anemia:    -Multifactorial  -There was no evidence of iron or B12 deficiency on the blood work done at [facility]  -Transfuse PRBCs if hemoglobin is less than 7.    3.  Respiratory failure:    -On broad-spectrum antibiotics  -Management per primary team.    Patient's other issues include    4. Chronic kidney disease    5. History of ANCA positive vasculitis    6. Coronary artery disease    7.   Diabetes      Rosa Elena Dorman MD  7/16/2020,

## 2020-07-17 NOTE — PROGRESS NOTES
[facility] PROGRESS NOTE    7/17/2020 2:38 PM        Name: Rodolfo Dawson . Admitted: 7/16/2020  Primary Care Provider: German Barragan MD (Tel: [phone])    Brief Course:           Subjective: Ronold Kanner Remains intubated sedated no overnight event.     Reviewed interval ancillary notes    Current Medications  hydrALAZINE (APRESOLINE) tablet 25 mg, TID  propofol injection, Titrated  sodium chloride flush 0.9 % injection 10 mL, 2 times per day  sodium chloride flush 0.9 % injection 10 mL, PRN  acetaminophen (TYLENOL) tablet 650 mg, Q6H PRN    Or  acetaminophen (TYLENOL) suppository 650 mg, Q6H PRN  polyethylene glycol (GLYCOLAX) packet 17 g, Daily PRN  promethazine (PHENERGAN) tablet 12.5 mg, Q6H PRN    Or  ondansetron (ZOFRAN) injection 4 mg, Q6H PRN  furosemide (LASIX) injection 20 mg, Daily  acetaminophen (TYLENOL) tablet 650 mg, Q6H PRN    Or  acetaminophen (TYLENOL) suppository 650 mg, Q6H PRN  dexamethasone (DECADRON) tablet 6 mg, Daily  cefepime (MAXIPIME) 1 g IVPB minibag, Q12H  linezolid (ZYVOX) IVPB 600 mg, Q12H  heparin (porcine) injection 5,000 Units, 3 times per day  pantoprazole (PROTONIX) injection 40 mg, Daily  fentaNYL (SUBLIMAZE) 1,000 mcg in sodium chloride 0.9 % 100 mL infusion, Continuous  hydrALAZINE (APRESOLINE) injection 10 mg, Q6H PRN  metoprolol tartrate (LOPRESSOR) tablet 50 mg, BID  NIFEdipine (PROCARDIA XL) extended release tablet 60 mg, Daily  atorvastatin (LIPITOR) tablet 20 mg, Daily  aspirin EC tablet 81 mg, Daily  tamsulosin (FLOMAX) capsule 0.4 mg, Daily  vitamin C (ASCORBIC ACID) tablet 500 mg, Daily  glucose (GLUTOSE) 40 % oral gel 15 g, PRN  dextrose 50 % IV solution, PRN  glucagon (rDNA) injection 1 mg, PRN  dextrose 5 % solution, PRN  insulin lispro (1 Unit Dial) 0-6 Units, Q4H        Objective:  BP (!) 150/69   Pulse 54   Temp 97 °F (36.1 °C) (Temporal)   Resp 20   Ht 5' 7.8\" (1.722 m)   Wt 161 lb 12.8 oz (73.4 kg)   SpO2 100%   BMI 24.75 kg/m²     Intake/Output Summary (Last 24 hours) at 7/17/2020 1438  Last data filed at 7/17/2020 1322  Gross per 24 hour   Intake 2116.83 ml   Output 2425 ml   Net -308.17 ml      Wt Readings from Last 3 Encounters:   07/17/20 161 lb 12.8 oz (73.4 kg)   01/27/20 164 lb 6.4 oz (74.6 kg)   01/06/20 167 lb 3.2 oz (75.8 kg)       General appearance:  Appears comfortable  Eyes: Sclera clear. Pupils equal.  ENT: Moist oral mucosa. Trachea midline, no adenopathy. Cardiovascular: Regular rhythm, normal S1, S2. No murmur. No edema in lower extremities  Respiratory: Not using accessory muscles. Good inspiratory effort. Clear to auscultation bilaterally, no wheeze or crackles. GI: Abdomen soft, no tenderness, not distended, normal bowel sounds  Musculoskeletal: No cyanosis in digits, neck supple  Neurology: Remains intubated sedated. S  Psych: Normal affect. Skin: Warm, dry, normal turgor  Extremity exam shows brisk capillary refill. Peripheral pulses are palpable in lower extremities     Labs and Tests:  CBC:   Recent Labs     07/16/20  1045 07/17/20  0455   WBC 19.6* 6.1   HGB 8.5* 7.0*    344     BMP:    Recent Labs     07/16/20  1045 07/17/20  0455    140   K 5.3* 4.1    104   CO2 27 27   BUN 47* 50*   CREATININE 3.7* 3.8*   GLUCOSE 228* 158*     Hepatic:   Recent Labs     07/16/20  1045 07/17/20  0455   * 35   * 64*   BILITOT <0.2 <0.2   ALKPHOS 100 69     XR CHEST PORTABLE   Final Result   Improvement in the appearance of the chest with some clearing of the   bibasilar opacities. Large amount of pneumonia remains in the lower right   lung and small amount of opacity remains in the left retrocardiac area either   due to additional pneumonia or atelectasis. No abnormal vascular congestion. Small bilateral pleural effusions.          XR ABDOMEN FOR NG/OG/NE TUBE PLACEMENT   Final Result   The NG tube extends into the lower.        Diet: Diet NPO Effective Now  Code:Full Code  DVT PPX  Disposition ICU care.       Eduarda Martell MD   7/17/2020 2:38 PM

## 2020-07-17 NOTE — PROGRESS NOTES
kg)   07/17/20 0443 -- -- -- 51 20 100 % --   07/17/20 0400 (!) 163/62 96.5 °F (35.8 °C) Temporal 67 20 100 % --     I/O last 3 completed shifts: In: 1347.2 [I.V.:1287. 2; NG/GT:60]  Out: 1800 [Urine:1500; Emesis/NG output:300]  No intake/output data recorded. Due to the current efforts to prevent transmission of COVID-19 and also the need to preserve PPE for other caregivers, a face-to-face encounter with the patient was not performed. That being said, all relevant records and diagnostic tests were reviewed, including laboratory results and imaging. Please reference any relevant documentation elsewhere. Care will be coordinated with the primary service. Data Review:  CBC:   Lab Results   Component Value Date    WBC 6.1 07/17/2020    RBC 2.43 07/17/2020     BMP:   Lab Results   Component Value Date    GLUCOSE 158 07/17/2020    CO2 27 07/17/2020    BUN 50 07/17/2020    CREATININE 3.8 07/17/2020    CALCIUM 9.5 07/17/2020     ABG:   Lab Results   Component Value Date    GHJ6SER 28.3 07/16/2020    BEART 3.8 07/16/2020    L4CCBTCZ 99.8 07/16/2020    PHART 7.437 07/16/2020    QYD3EUW 42.1 07/16/2020    PO2ART 143.0 07/16/2020    MGF5LKA 66.4 07/16/2020       Radiology: All pertinent images / reports were reviewed as a part of this visit. Narrative    EXAMINATION:    ONE XRAY VIEW OF THE CHEST         7/17/2020 7:56 am         COMPARISON:    07/16/2020         HISTORY:    ORDERING SYSTEM PROVIDED HISTORY: vent    TECHNOLOGIST PROVIDED HISTORY:    Reason for exam:->vent    Reason for Exam: vent    Acuity: Unknown    Type of Exam: Unknown         Acute.  Follow-up study.         FINDINGS:    Heart size is normal.  Large amount of opacity present in the lower right    lung improved since yesterday.  Small amount of opacity in the left    retrocardiac area also improved.  No abnormal pulmonary vascular congestion.     Small bilateral pleural effusions.  Endotracheal tube, nasogastric tube, and    right internal jugular hemodialysis catheter unchanged.              Impression    Improvement in the appearance of the chest with some clearing of the    bibasilar opacities.  Large amount of pneumonia remains in the lower right    lung and small amount of opacity remains in the left retrocardiac area either    due to additional pneumonia or atelectasis.  No abnormal vascular congestion. Small bilateral pleural effusions. Problem List:   Acute on chronic hypoxic/hypercapnic respiratory failure  Right lung consolidation versus malignancy  CKD stage IV  History of MGUS cryoglobulinemia  COVID-19 rule out      Assessment/Plan:     Acute on chronic hypoxic/hypercapnic respiratory failure-reviewed chest x-ray which shows opacity over the right lower lobe ? Malignancy/radiation changes versus pneumonia. Given the acute presentation of hypoxia-empirically treat with antibiotics, continue cefepime and Zyvox. Hypercapnia noted post intubation, resolved with increased RR. Right lower lobe infiltrate ? Related to malignancy/radiation versus mucous plugging. CT chest will be performed today. If bronchoscopy is not indicated, will aim. for extubation today.     Rule out COVID-19 infection-patient at risk due to frequent visits for dialysis and also immunosuppressed. D-dimer elevated which is nonspecific.     CKD stage IV with MGUS cryoglobulinemia-receiving outpatient plasmaphoresis and Velcade therapy. Creatinine 3.7, which is close to baseline. Monitor. Will consult nephrology.     Currently hemodynamically stable, not requiring vasopressors    Anemia, hemoglobin of 7.0. Plans for 1 unit of PRBC.     Heparin for prophylaxis. IV Protonix for GI prophylaxis.     Critical care team will follow. Glenroy Jarrett MD     Critical care time of 32 minutes excluding procedures    Addendum: Reviewed patient CT imaging which shows right hilar mass with mediastinal adenopathy and a small right pleural effusion. Okay to extubate from pulmonary standpoint if tolerated-perform spontaneous breathing trial.

## 2020-07-17 NOTE — PROGRESS NOTES
Telephone consent received for blood products from spouse, Donovan James, update given to wife with all questions answered.

## 2020-07-17 NOTE — PLAN OF CARE
Problem: Falls - Risk of:  Goal: Will remain free from falls  Description: Will remain free from falls  7/17/2020 1028 by Newton Henderson RN  Outcome: Ongoing  7/16/2020 2329 by Diana Lomeli RN  Outcome: Ongoing  Goal: Absence of physical injury  Description: Absence of physical injury  Outcome: Ongoing     Problem: Skin Integrity:  Goal: Will show no infection signs and symptoms  Description: Will show no infection signs and symptoms  Outcome: Ongoing  Goal: Absence of new skin breakdown  Description: Absence of new skin breakdown  7/17/2020 1028 by Newton Henderson RN  Outcome: Ongoing  7/16/2020 2329 by Diana Lomeli RN  Outcome: Ongoing     Problem: Nutritional:  Goal: Maintenance of adequate nutrition will improve  Description: Maintenance of adequate nutrition will improve  7/17/2020 1028 by Newton Henderson RN  Outcome: Ongoing  7/16/2020 2329 by Diana Lomeli RN  Outcome: Ongoing  Goal: Progress toward achieving an optimal weight will improve  Description: Progress toward achieving an optimal weight will improve  Outcome: Ongoing     Problem: Restraint Use - Nonviolent/Non-Self-Destructive Behavior:  Goal: Absence of restraint indications  Description: Absence of restraint indications  Outcome: Ongoing  Goal: Absence of restraint-related injury  Description: Absence of restraint-related injury  Outcome: Ongoing

## 2020-07-17 NOTE — TELEPHONE ENCOUNTER
----- Message from Wil Watkins sent at 7/17/2020  2:47 PM EDT -----  Subject: Message to Provider    QUESTIONS  Information for Provider? ED- VIRAJ IRAHETA 07/16/20 COULD NOT GET   OXYGEN LEVELS UP. PT IS ON A INCUBATOR NOW AND CURRENTLY STILL AT THE ED. OKAY TO Jonas Castaneda [phone]  ---------------------------------------------------------------------------  --------------  Abel SINGLETARY  What is the best way for the office to contact you? OK to leave message on   voicemail  Preferred Call Back Phone Number? [phone]  ---------------------------------------------------------------------------  --------------  SCRIPT ANSWERS  Relationship to Patient? Other  Representative Name? Vlad Figueroa  Is the Representative on the appropriate HIPAA document in Epic?  Yes

## 2020-07-17 NOTE — PROGRESS NOTES
07/16/20 2023   [facility] Patient Data   Plateau Pressure 20 [id number]   Static Compliance 39 mL/cmH2O   Dynamic Compliance 26 mL/cmH2O

## 2020-07-17 NOTE — PROGRESS NOTES
Patient went to CT; RT and RN accompanied. Patient returned to room with no incidents.      Electronically signed by QUEENIE JohnsonN, RN

## 2020-07-17 NOTE — PROGRESS NOTES
Patient extubated without incident per MD verbal order. Placed on 3 liter nasal cannula(patient's home O2). Will continue to monitor closely.

## 2020-07-17 NOTE — PROGRESS NOTES
Shift assessment complete. Pt is intubated and sedated. ETT in place size 7.5, 25 at the lip. OG in place 56 at the lip. Propofol at 25 mcg/kg/min, Fentanyl at 0.5 mcg/kg/hr. PERRLA. RASS -2. Lung sounds noted to be diminished bilaterally in upper and lower lobes. Current Vent Settings: AC 20, , FiO2 40%, PEEP 5. Pt is sinus bradycardia per monitor. No adventitious heart sounds noted. Bowel sounds active in all four quadrants. Radial pulses +2, pedal pulses +1. Feet cool to the touch. Han patent and draining clear yellow urine. IV R AC patent and flushing well. CVC R femoral triple lumen patent and infusing. Pt being turned Q2H to prevent skin breakdown, pillow support provided. Oral care provided. Will continue to monitor and assess.      Electronically signed by QUEENIE RuedaN, RN

## 2020-07-18 LAB
A/G RATIO: 1.5 (ref 1.1–2.2)
ALBUMIN SERPL-MCNC: 3.2 G/DL (ref 3.4–5)
ALP BLD-CCNC: 68 U/L (ref 40–129)
ALT SERPL-CCNC: 42 U/L (ref 10–40)
ANION GAP SERPL CALCULATED.3IONS-SCNC: 9 MMOL/L (ref 3–16)
APTT: 27.6 SEC (ref 24.2–36.2)
AST SERPL-CCNC: 18 U/L (ref 15–37)
BASOPHILS ABSOLUTE: 0 K/UL (ref 0–0.2)
BASOPHILS RELATIVE PERCENT: 0.2 %
BILIRUB SERPL-MCNC: <0.2 MG/DL (ref 0–1)
BUN BLDV-MCNC: 49 MG/DL (ref 7–20)
CALCIUM SERPL-MCNC: 9.4 MG/DL (ref 8.3–10.6)
CHLORIDE BLD-SCNC: 104 MMOL/L (ref 99–110)
CO2: 29 MMOL/L (ref 21–32)
CREAT SERPL-MCNC: 3.7 MG/DL (ref 0.8–1.3)
D DIMER: 873 NG/ML DDU (ref 0–229)
EOSINOPHILS ABSOLUTE: 0 K/UL (ref 0–0.6)
EOSINOPHILS RELATIVE PERCENT: 0.1 %
FIBRINOGEN: 294 MG/DL (ref 200–397)
GFR AFRICAN AMERICAN: 19
GFR NON-AFRICAN AMERICAN: 16
GLOBULIN: 2.2 G/DL
GLUCOSE BLD-MCNC: 105 MG/DL (ref 70–99)
GLUCOSE BLD-MCNC: 111 MG/DL (ref 70–99)
GLUCOSE BLD-MCNC: 114 MG/DL (ref 70–99)
GLUCOSE BLD-MCNC: 125 MG/DL (ref 70–99)
GLUCOSE BLD-MCNC: 140 MG/DL (ref 70–99)
HCT VFR BLD CALC: 24.2 % (ref 40.5–52.5)
HEMOGLOBIN: 7.8 G/DL (ref 13.5–17.5)
INR BLD: 0.92 (ref 0.86–1.14)
LYMPHOCYTES ABSOLUTE: 1.2 K/UL (ref 1–5.1)
LYMPHOCYTES RELATIVE PERCENT: 11.7 %
MCH RBC QN AUTO: 27.8 PG (ref 26–34)
MCHC RBC AUTO-ENTMCNC: 32.3 G/DL (ref 31–36)
MCV RBC AUTO: 86 FL (ref 80–100)
MONOCYTES ABSOLUTE: 0.9 K/UL (ref 0–1.3)
MONOCYTES RELATIVE PERCENT: 9 %
NEUTROPHILS ABSOLUTE: 8.1 K/UL (ref 1.7–7.7)
NEUTROPHILS RELATIVE PERCENT: 79 %
PDW BLD-RTO: 17.4 % (ref 12.4–15.4)
PERFORMED ON: ABNORMAL
PLATELET # BLD: 183 K/UL (ref 135–450)
PMV BLD AUTO: 9.4 FL (ref 5–10.5)
POTASSIUM REFLEX MAGNESIUM: 4.2 MMOL/L (ref 3.5–5.1)
PRO-BNP: 8531 PG/ML (ref 0–449)
PROTHROMBIN TIME: 10.7 SEC (ref 10–13.2)
RBC # BLD: 2.81 M/UL (ref 4.2–5.9)
SODIUM BLD-SCNC: 142 MMOL/L (ref 136–145)
TOTAL PROTEIN: 5.4 G/DL (ref 6.4–8.2)
WBC # BLD: 10.2 K/UL (ref 4–11)

## 2020-07-18 PROCEDURE — 6360000002 HC RX W HCPCS: Performed by: INTERNAL MEDICINE

## 2020-07-18 PROCEDURE — 2000000000 HC ICU R&B

## 2020-07-18 PROCEDURE — 6370000000 HC RX 637 (ALT 250 FOR IP): Performed by: INTERNAL MEDICINE

## 2020-07-18 PROCEDURE — 6370000000 HC RX 637 (ALT 250 FOR IP): Performed by: HOSPITALIST

## 2020-07-18 PROCEDURE — 85610 PROTHROMBIN TIME: CPT

## 2020-07-18 PROCEDURE — 80053 COMPREHEN METABOLIC PANEL: CPT

## 2020-07-18 PROCEDURE — 85384 FIBRINOGEN ACTIVITY: CPT

## 2020-07-18 PROCEDURE — 6360000002 HC RX W HCPCS: Performed by: HOSPITALIST

## 2020-07-18 PROCEDURE — 2580000003 HC RX 258: Performed by: HOSPITALIST

## 2020-07-18 PROCEDURE — C9113 INJ PANTOPRAZOLE SODIUM, VIA: HCPCS | Performed by: INTERNAL MEDICINE

## 2020-07-18 PROCEDURE — 85379 FIBRIN DEGRADATION QUANT: CPT

## 2020-07-18 PROCEDURE — 83880 ASSAY OF NATRIURETIC PEPTIDE: CPT

## 2020-07-18 PROCEDURE — 99232 SBSQ HOSP IP/OBS MODERATE 35: CPT | Performed by: INTERNAL MEDICINE

## 2020-07-18 PROCEDURE — 94760 N-INVAS EAR/PLS OXIMETRY 1: CPT

## 2020-07-18 PROCEDURE — 85730 THROMBOPLASTIN TIME PARTIAL: CPT

## 2020-07-18 PROCEDURE — 2700000000 HC OXYGEN THERAPY PER DAY

## 2020-07-18 PROCEDURE — 85025 COMPLETE CBC W/AUTO DIFF WBC: CPT

## 2020-07-18 RX ORDER — HYDRALAZINE HYDROCHLORIDE 25 MG/1
50 TABLET, FILM COATED ORAL 3 TIMES DAILY
Status: DISCONTINUED | OUTPATIENT
Start: 2020-07-18 | End: 2020-07-20

## 2020-07-18 RX ADMIN — HYDRALAZINE HYDROCHLORIDE 25 MG: 25 TABLET, FILM COATED ORAL at 08:46

## 2020-07-18 RX ADMIN — HEPARIN SODIUM 5000 UNITS: 5000 INJECTION INTRAVENOUS; SUBCUTANEOUS at 05:56

## 2020-07-18 RX ADMIN — Medication 10 ML: at 21:23

## 2020-07-18 RX ADMIN — HEPARIN SODIUM 5000 UNITS: 5000 INJECTION INTRAVENOUS; SUBCUTANEOUS at 14:14

## 2020-07-18 RX ADMIN — HEPARIN SODIUM 5000 UNITS: 5000 INJECTION INTRAVENOUS; SUBCUTANEOUS at 21:20

## 2020-07-18 RX ADMIN — INSULIN LISPRO 1 UNITS: 100 INJECTION, SOLUTION INTRAVENOUS; SUBCUTANEOUS at 00:35

## 2020-07-18 RX ADMIN — METOPROLOL TARTRATE 50 MG: 50 TABLET, FILM COATED ORAL at 08:46

## 2020-07-18 RX ADMIN — PANTOPRAZOLE SODIUM 40 MG: 40 INJECTION, POWDER, FOR SOLUTION INTRAVENOUS at 08:53

## 2020-07-18 RX ADMIN — Medication 10 ML: at 08:53

## 2020-07-18 RX ADMIN — DEXAMETHASONE 6 MG: 4 TABLET ORAL at 08:52

## 2020-07-18 RX ADMIN — Medication 500 MG: at 08:53

## 2020-07-18 RX ADMIN — HYDRALAZINE HYDROCHLORIDE 50 MG: 25 TABLET, FILM COATED ORAL at 21:20

## 2020-07-18 RX ADMIN — FUROSEMIDE 20 MG: 10 INJECTION, SOLUTION INTRAMUSCULAR; INTRAVENOUS at 08:46

## 2020-07-18 RX ADMIN — TAMSULOSIN HYDROCHLORIDE 0.4 MG: 0.4 CAPSULE ORAL at 08:46

## 2020-07-18 RX ADMIN — ATORVASTATIN CALCIUM 20 MG: 20 TABLET, FILM COATED ORAL at 08:46

## 2020-07-18 RX ADMIN — NIFEDIPINE 60 MG: 30 TABLET, EXTENDED RELEASE ORAL at 08:46

## 2020-07-18 RX ADMIN — HYDRALAZINE HYDROCHLORIDE 50 MG: 25 TABLET, FILM COATED ORAL at 14:14

## 2020-07-18 RX ADMIN — CEFEPIME HYDROCHLORIDE 1 G: 1 INJECTION, POWDER, FOR SOLUTION INTRAMUSCULAR; INTRAVENOUS at 00:53

## 2020-07-18 RX ADMIN — METOPROLOL TARTRATE 50 MG: 50 TABLET, FILM COATED ORAL at 21:20

## 2020-07-18 RX ADMIN — LINEZOLID 600 MG: 600 INJECTION, SOLUTION INTRAVENOUS at 05:11

## 2020-07-18 RX ADMIN — HYDRALAZINE HYDROCHLORIDE 10 MG: 20 INJECTION INTRAMUSCULAR; INTRAVENOUS at 05:11

## 2020-07-18 RX ADMIN — ASPIRIN 81 MG: 81 TABLET, COATED ORAL at 08:52

## 2020-07-18 ASSESSMENT — ENCOUNTER SYMPTOMS
CHEST TIGHTNESS: 0
CONSTIPATION: 0
ABDOMINAL DISTENTION: 0
DIARRHEA: 0
VOMITING: 0
ABDOMINAL PAIN: 0
EYE REDNESS: 0
FACIAL SWELLING: 0
NAUSEA: 0
BLOOD IN STOOL: 0
PHOTOPHOBIA: 0
SHORTNESS OF BREATH: 0
EYE DISCHARGE: 0
COUGH: 0

## 2020-07-18 ASSESSMENT — PAIN SCALES - GENERAL
PAINLEVEL_OUTOF10: 0

## 2020-07-18 NOTE — PROGRESS NOTES
Speech Language Pathology   Hold Note -SLP Evaluation Day 0  Name:  Fuentes Coon  :  1940  Medical Diagnosis: Acute respiratory failure ([facility] [address] [J96.00]  Acute respiratory failure ([address] [J96.00]    SLP/dysphagia evaluation orders received. Pt currently undergoing testing for COVID-19 and is in droplet plus isolation. Thorough clinical chart review completed by SLP in order to assess current aspiration risk as potential primary contributor to respiratory decline. Per infection control formal (face to face) clinical swallow evaluation was not completed at this time, in order to minimize infection exposure. Impressions:  Per informal assessment, Pt currently demonstrates  moderate risk indicators for potential dysphagia / aspiration per chart review. However, SLP will follow up with patient and complete formal clinical swallow assessment when Pt is no longer in COVID-19 isolation and as clinically indicated. Conclusion/Recommendations:   1) Due to moderate risk for dysphagia/aspiration per Pt history and current status, Formal clinical swallow evaluation IS recommended when Pt is no longer in COVID-19 isolation and prior to discharge from the hospital    Consulted with nurse to proceed with swallow screen. If pt tolerates, allow thin liquids until full evaluation is able to be completed. Nurse reported that pt is tolerating meds in puree, allow puree textures at this time. Evelina Baptiste, [id number]  Speech-Language Pathologist   2020 10:25 AM

## 2020-07-18 NOTE — PROGRESS NOTES
Assessment completed, alert and oriented x 3, oriented to person, place, and time, unsure of the current situation and what happened to cause him to be hospitalized. Oxygen on at 2L/NC. NS at [facility] via right femoral central line, site unremarkable. Oral meds given with applesauce. No signs of aspiration noted.

## 2020-07-18 NOTE — PROGRESS NOTES
[facility]    7/18/2020 11:30 AM        Name: Jerry Feldman . Admitted: 7/16/2020  Primary Care Provider: Govind Hdz MD (Tel: [phone])                        Subjective:  . No acute events overnight. Resting well. Pain control. Diet ok. Labs reviewed  Denies any chest pain sob.      Reviewed interval ancillary notes    Current Medications  hydrALAZINE (APRESOLINE) tablet 50 mg, TID  sodium chloride flush 0.9 % injection 10 mL, 2 times per day  sodium chloride flush 0.9 % injection 10 mL, PRN  acetaminophen (TYLENOL) tablet 650 mg, Q6H PRN    Or  acetaminophen (TYLENOL) suppository 650 mg, Q6H PRN  polyethylene glycol (GLYCOLAX) packet 17 g, Daily PRN  promethazine (PHENERGAN) tablet 12.5 mg, Q6H PRN    Or  ondansetron (ZOFRAN) injection 4 mg, Q6H PRN  furosemide (LASIX) injection 20 mg, Daily  acetaminophen (TYLENOL) tablet 650 mg, Q6H PRN    Or  acetaminophen (TYLENOL) suppository 650 mg, Q6H PRN  heparin (porcine) injection 5,000 Units, 3 times per day  pantoprazole (PROTONIX) injection 40 mg, Daily  hydrALAZINE (APRESOLINE) injection 10 mg, Q6H PRN  metoprolol tartrate (LOPRESSOR) tablet 50 mg, BID  NIFEdipine (PROCARDIA XL) extended release tablet 60 mg, Daily  atorvastatin (LIPITOR) tablet 20 mg, Daily  aspirin EC tablet 81 mg, Daily  tamsulosin (FLOMAX) capsule 0.4 mg, Daily  vitamin C (ASCORBIC ACID) tablet 500 mg, Daily  glucose (GLUTOSE) 40 % oral gel 15 g, PRN  dextrose 50 % IV solution, PRN  glucagon (rDNA) injection 1 mg, PRN  dextrose 5 % solution, PRN  insulin lispro (1 Unit Dial) 0-6 Units, Q4H        Objective:  BP (!) 146/59   Pulse 79   Temp 98 °F (36.7 °C) (Temporal)   Resp 23   Ht 5' 7.8\" (1.722 m)   Wt 158 lb 12.8 oz (72 kg)   SpO2 97%   BMI 24.29 kg/m²     Intake/Output Summary (Last 24 hours) at 7/18/2020 1130  Last data filed at 7/18/2020 0600  Gross per 24 hour   Intake 1596.63 ml   Output 2500 ml   Net -903.37 ml      Wt Readings from Last 3 Encounters:   07/18/20 158 lb 12.8 oz (72 kg)   01/27/20 164 lb 6.4 oz (74.6 kg)   01/06/20 167 lb 3.2 oz (75.8 kg)       General appearance:  Appears comfortable  Eyes: Sclera clear. Pupils equal.  ENT: Moist oral mucosa. Trachea midline, no adenopathy. Cardiovascular: Regular rhythm, normal S1, S2. No murmur. No edema in lower extremities  Respiratory: Not using accessory muscles. Good inspiratory effort. Clear to auscultation bilaterally, no wheeze or crackles. GI: Abdomen soft, no tenderness, not distended, normal bowel sounds  Musculoskeletal: No cyanosis in digits, neck supple  Neurology: CN 2-12 grossly intact. No speech or motor deficits  Psych: Normal affect.  Alert and oriented in time, place and person  Skin: Warm, dry, normal turgor    Labs and Tests:  CBC:   Recent Labs     07/16/20  1045 07/17/20  0455 07/17/20  1610 07/18/20  0445   WBC 19.6* 6.1  --  10.2   HGB 8.5* 7.0* 8.1* 7.8*    344  --  183     BMP:    Recent Labs     07/16/20  1045 07/17/20  0455 07/18/20  0445    140 142   K 5.3* 4.1 4.2    104 104   CO2 27 27 29   BUN 47* 50* 49*   CREATININE 3.7* 3.8* 3.7*   GLUCOSE 228* 158* 114*     Hepatic:   Recent Labs     07/16/20  1045 07/17/20  0455 07/18/20  0445   * 35 18   * 64* 42*   BILITOT <0.2 <0.2 <0.2   ALKPHOS 100 69 68       Discussed care with family and patient             Spent 30  minutes with patient and family at bedside and on unit reviewing medical records and labs, spent greater than 50% time counseling patient and family on diagnosis and plan   Problem List  Active Problems:    Diabetes mellitus type 2 with complications ([address]    Essential hypertension    Raynaud's disease    Type 2 diabetes mellitus with diabetic nephropathy (HCC)    Chronic renal insufficiency, stage 3 (moderate) (HCC)    Malignant

## 2020-07-18 NOTE — PROGRESS NOTES
[facility] Note    Patient Active Problem List   Diagnosis    Diabetes mellitus type 2 with complications ([facility] [address]    Essential hypertension    Hypercholesteremia    Coronary atherosclerosis    Well adult exam    Raynaud's disease    Urinary frequency    Microalbuminuria    Abdominal aortic aneurysm without rupture ([facility] [address]    Bilateral carotid artery stenosis    Type 2 diabetes mellitus with diabetic nephropathy (HCC)    Vasculogenic erectile dysfunction    Chronic renal insufficiency, stage 3 (moderate) (HCC)    Adjustment disorder with mixed anxiety and depressed mood    BPH (benign prostatic hyperplasia)    Dyslipidemia    Hypokalemia    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 Past Social History:   reports that he quit smoking about 14 years ago. He has never used smokeless tobacco. He reports current alcohol use of about 2.0 standard drinks of alcohol per week. He reports that he does not use drugs. Subjective:  Extubated. Good urine output    Review of Systems   Constitutional: Positive for fatigue. Negative for activity change, appetite change, chills, fever and unexpected weight change. HENT: Negative for congestion and facial swelling. Eyes: Negative for photophobia, discharge and redness. Respiratory: Negative for cough, chest tightness and shortness of breath. Cardiovascular: Negative for chest pain, palpitations and leg swelling. Gastrointestinal: Negative for abdominal distention, abdominal pain, blood in stool, constipation, diarrhea, nausea and vomiting.    Endocrine: Negative for cold the  Plasmapheresis. 5.  Anemia. Hematology is following. ?MARTI. 6.  Acute respiratory failure, now extubated  7. Diabetes mellitus. Management as per Medicine. High risk. Would need close f/u.      Jessica Hope MD

## 2020-07-18 NOTE — PLAN OF CARE
Problem: Falls - Risk of:  Goal: Will remain free from falls  Description: Will remain free from falls  7/17/2020 2215 by Marcellus Plunkett RN  Outcome: Ongoing  7/17/2020 1028 by Keith Wright RN  Outcome: Ongoing     Problem: Skin Integrity:  Goal: Absence of new skin breakdown  Description: Absence of new skin breakdown  7/17/2020 2215 by Marcellus Plunkett RN  Outcome: Ongoing  7/17/2020 1028 by Keith Wright RN  Outcome: Ongoing

## 2020-07-18 NOTE — PROGRESS NOTES
Pulmonary Medicine Progress Note      Name:  Keily Liang Date/Time of Admission: 2020 10:31 AM    CSN: [id number] Attending Provider: Jordan Lara MD   Room/Bed: S8P-3652/2238-29 : 1940 Age: 78 y.o. SUBJECTIVE     Patient is seen for reevaluation of   []COPD/COPD exacerbation   []Asthma/asthma exacerbation   [x]Pulmonary nodule/lung mass and/or other abnormal chest imaging   []  Pneumonia   []SOB    []ILD and/or sarcoidosis  []  Respiratory failure  [] Pleural effusion  []  Pulmonary embolism/DVT  [] other:    Extubated yesterday. Back on home 3 L of oxygen. Chart reviewed patient examined imaging reviewed. Denies new complaints. Bilateral, right more than left pleural effusion and right hilar mass on chest imaging. No evidence of infection. Previously negative procalcitonin. Negative cultures. OBJECTIVE         CURRENT MEDS:   hydrALAZINE  25 mg Oral TID    sodium chloride flush  10 mL Intravenous 2 times per day    furosemide  20 mg Intravenous Daily    dexamethasone  6 mg Oral Daily    heparin (porcine)  5,000 Units Subcutaneous 3 times per day    pantoprazole  40 mg Intravenous Daily    metoprolol tartrate  50 mg Oral BID    NIFEdipine  60 mg Oral Daily    atorvastatin  20 mg Oral Daily    aspirin EC  81 mg Oral Daily    tamsulosin  0.4 mg Oral Daily    vitamin C  500 mg Oral Daily    insulin lispro  0-6 Units Subcutaneous Q4H       INTAKE/OUTPUT:  I/O last 3 completed shifts: In: 1596.6 [I.V.:537.6; Blood:409; IV Piggyback:650]  Out: 2500 [Urine:2500]  No intake/output data recorded. VITAL SIGNS:  Current Vital Signs  BP (!) 146/59   Pulse 79   Temp 98 °F (36.7 °C) (Temporal)   Resp 23   Ht 5' 7.8\" (1.722 m)   Wt 158 lb 12.8 oz (72 kg)   SpO2 97%   BMI 24.29 kg/m²   FiO2 : 40 %        Patient was examined via telemedicine to decrease the exposure and preserve PPE. Exam therefore was limited.     Constitutional: Well developed  HENT: Head normocephalic, atraumatic; Mucous membranes pink and moist  Eyes: Anicteric sclera. Neck: Supple, NTrachea midline, no JVD   Lymphatic: No lymphadenopathy noted. Chest Symmetrical chest expansion, no accessory muscle use  Abdomen:  No distension. Skin: Dry, No erythema, No rash. Extremities: No edema of lower extremities. Neurologic:  No gross focal deficits noted. Psychiatric: Affect normal, Judgment normal, Mood normal.       LABS:  Hematology  Recent Labs     07/18/20  0445   WBC 10.2   HCT 24.2*        Recent Labs     07/18/20  0445   PROTIME 10.7   INR 0.92       Chemistries  Recent Labs     07/18/20  0445      K 4.2      CO2 29   BUN 49*   CREATININE 3.7*     Recent Labs     07/18/20  0445   CALCIUM 9.4       LFTs  Recent Labs     07/18/20  0445   AST 18   ALT 42*   ALKPHOS 68     Recent Labs     07/16/20  1045   LIPASE 45.0       Arterial Blood Gasses  No results for input(s): PH, PCO2, PO2 in the last 72 hours. Invalid input(s): [id number], INSPIREDO2    Cardiac Enzymes  Recent Labs     07/16/20  1045   TROPONINI 0.04*       Microbiology  [unfilled]    Imaging (chest imaging was personally reviewed)  [unfilled]    [unfilled]      ASSESSMENT   1. Lung mass/hilar lymphadenopathy. This is likely due to recurrence of carcinoma on the same side  2. Acute on chronic hypoxic respiratory failure, acute component has resolved, extubated  3. Bilateral pleural effusion right more than left. Unable to exclude malignancy. 4. History of lung cancer likely with recurrence    PLAN   1. Discontinue propofol and fentanyl from the profile  2. Oxygen for pulse oximetry 89% or above  3. Discontinue Decadron. There is no evidence of viral pneumonia nor COVID-19.  4. Transfer to floor when bed is available  5. Will need outpatient pulmonary follow-up.   Thoracentesis and/or bronchoscopy with endobronchial ultrasound-guided transbronchial needle aspiration should be considered once COVID-19 status has been established  6. Discontinue antibiotics  7. Follow results of COVID-19 testing      Case discussed with: Critical care team, bedside nurse, patient    Please note that this chart was generated using dragon dictation software. Although every effort was made to ensure the accuracy of this automated transcription, some errors in transcription may have occurred.        Electronically Signed:  Maricruz Dumont MD 7/18/2020 10:44 AM

## 2020-07-18 NOTE — PROGRESS NOTES
Shift assessment complete, see flowsheet. VSS, pt resting comfortably in bed. Meds given, see MAR. Pt repositioned for comfort, denies further needs at this time, will continue to monitor.

## 2020-07-19 LAB
A/G RATIO: 1.7 (ref 1.1–2.2)
ALBUMIN SERPL-MCNC: 3.4 G/DL (ref 3.4–5)
ALP BLD-CCNC: 65 U/L (ref 40–129)
ALT SERPL-CCNC: 29 U/L (ref 10–40)
ANION GAP SERPL CALCULATED.3IONS-SCNC: 10 MMOL/L (ref 3–16)
APTT: 30 SEC (ref 24.2–36.2)
AST SERPL-CCNC: 13 U/L (ref 15–37)
BASOPHILS ABSOLUTE: 0 K/UL (ref 0–0.2)
BASOPHILS RELATIVE PERCENT: 0.3 %
BILIRUB SERPL-MCNC: 0.3 MG/DL (ref 0–1)
BUN BLDV-MCNC: 42 MG/DL (ref 7–20)
CALCIUM SERPL-MCNC: 9.5 MG/DL (ref 8.3–10.6)
CHLORIDE BLD-SCNC: 105 MMOL/L (ref 99–110)
CO2: 30 MMOL/L (ref 21–32)
CREAT SERPL-MCNC: 3.7 MG/DL (ref 0.8–1.3)
D DIMER: 807 NG/ML DDU (ref 0–229)
EOSINOPHILS ABSOLUTE: 0.3 K/UL (ref 0–0.6)
EOSINOPHILS RELATIVE PERCENT: 3.3 %
FIBRINOGEN: 359 MG/DL (ref 200–397)
GFR AFRICAN AMERICAN: 19
GFR NON-AFRICAN AMERICAN: 16
GLOBULIN: 2 G/DL
GLUCOSE BLD-MCNC: 104 MG/DL (ref 70–99)
GLUCOSE BLD-MCNC: 107 MG/DL (ref 70–99)
GLUCOSE BLD-MCNC: 108 MG/DL (ref 70–99)
GLUCOSE BLD-MCNC: 110 MG/DL (ref 70–99)
GLUCOSE BLD-MCNC: 122 MG/DL (ref 70–99)
GLUCOSE BLD-MCNC: 170 MG/DL (ref 70–99)
GLUCOSE BLD-MCNC: 188 MG/DL (ref 70–99)
HCT VFR BLD CALC: 26.2 % (ref 40.5–52.5)
HEMOGLOBIN: 8.5 G/DL (ref 13.5–17.5)
INR BLD: 0.95 (ref 0.86–1.14)
LYMPHOCYTES ABSOLUTE: 1.1 K/UL (ref 1–5.1)
LYMPHOCYTES RELATIVE PERCENT: 13.2 %
MCH RBC QN AUTO: 28.1 PG (ref 26–34)
MCHC RBC AUTO-ENTMCNC: 32.3 G/DL (ref 31–36)
MCV RBC AUTO: 87.2 FL (ref 80–100)
MONOCYTES ABSOLUTE: 0.9 K/UL (ref 0–1.3)
MONOCYTES RELATIVE PERCENT: 10.3 %
NEUTROPHILS ABSOLUTE: 6.3 K/UL (ref 1.7–7.7)
NEUTROPHILS RELATIVE PERCENT: 72.9 %
PDW BLD-RTO: 17.1 % (ref 12.4–15.4)
PERFORMED ON: ABNORMAL
PLATELET # BLD: 195 K/UL (ref 135–450)
PMV BLD AUTO: 9.4 FL (ref 5–10.5)
POTASSIUM REFLEX MAGNESIUM: 3.9 MMOL/L (ref 3.5–5.1)
PROTHROMBIN TIME: 11 SEC (ref 10–13.2)
RBC # BLD: 3.01 M/UL (ref 4.2–5.9)
SARS-COV-2: NOT DETECTED
SODIUM BLD-SCNC: 145 MMOL/L (ref 136–145)
TOTAL PROTEIN: 5.4 G/DL (ref 6.4–8.2)
WBC # BLD: 8.6 K/UL (ref 4–11)

## 2020-07-19 PROCEDURE — 99232 SBSQ HOSP IP/OBS MODERATE 35: CPT | Performed by: INTERNAL MEDICINE

## 2020-07-19 PROCEDURE — 6370000000 HC RX 637 (ALT 250 FOR IP): Performed by: INTERNAL MEDICINE

## 2020-07-19 PROCEDURE — 85025 COMPLETE CBC W/AUTO DIFF WBC: CPT

## 2020-07-19 PROCEDURE — 6370000000 HC RX 637 (ALT 250 FOR IP): Performed by: HOSPITALIST

## 2020-07-19 PROCEDURE — 85384 FIBRINOGEN ACTIVITY: CPT

## 2020-07-19 PROCEDURE — 6360000002 HC RX W HCPCS: Performed by: INTERNAL MEDICINE

## 2020-07-19 PROCEDURE — C9113 INJ PANTOPRAZOLE SODIUM, VIA: HCPCS | Performed by: INTERNAL MEDICINE

## 2020-07-19 PROCEDURE — 94760 N-INVAS EAR/PLS OXIMETRY 1: CPT

## 2020-07-19 PROCEDURE — 6360000002 HC RX W HCPCS: Performed by: HOSPITALIST

## 2020-07-19 PROCEDURE — 80053 COMPREHEN METABOLIC PANEL: CPT

## 2020-07-19 PROCEDURE — 92610 EVALUATE SWALLOWING FUNCTION: CPT

## 2020-07-19 PROCEDURE — 6370000000 HC RX 637 (ALT 250 FOR IP): Performed by: PHYSICIAN ASSISTANT

## 2020-07-19 PROCEDURE — 85730 THROMBOPLASTIN TIME PARTIAL: CPT

## 2020-07-19 PROCEDURE — 85379 FIBRIN DEGRADATION QUANT: CPT

## 2020-07-19 PROCEDURE — 2700000000 HC OXYGEN THERAPY PER DAY

## 2020-07-19 PROCEDURE — 2580000003 HC RX 258: Performed by: HOSPITALIST

## 2020-07-19 PROCEDURE — 92526 ORAL FUNCTION THERAPY: CPT

## 2020-07-19 PROCEDURE — 85610 PROTHROMBIN TIME: CPT

## 2020-07-19 PROCEDURE — 2060000000 HC ICU INTERMEDIATE R&B

## 2020-07-19 RX ORDER — INSULIN LISPRO 100 [IU]/ML
0-6 INJECTION, SOLUTION INTRAVENOUS; SUBCUTANEOUS
Status: DISCONTINUED | OUTPATIENT
Start: 2020-07-20 | End: 2020-07-22 | Stop reason: HOSPADM

## 2020-07-19 RX ORDER — INSULIN LISPRO 100 [IU]/ML
0-3 INJECTION, SOLUTION INTRAVENOUS; SUBCUTANEOUS NIGHTLY
Status: DISCONTINUED | OUTPATIENT
Start: 2020-07-19 | End: 2020-07-22 | Stop reason: HOSPADM

## 2020-07-19 RX ADMIN — HEPARIN SODIUM 5000 UNITS: 5000 INJECTION INTRAVENOUS; SUBCUTANEOUS at 20:56

## 2020-07-19 RX ADMIN — METOPROLOL TARTRATE 50 MG: 50 TABLET, FILM COATED ORAL at 20:56

## 2020-07-19 RX ADMIN — ASPIRIN 81 MG: 81 TABLET, COATED ORAL at 09:15

## 2020-07-19 RX ADMIN — TAMSULOSIN HYDROCHLORIDE 0.4 MG: 0.4 CAPSULE ORAL at 09:15

## 2020-07-19 RX ADMIN — FUROSEMIDE 20 MG: 10 INJECTION, SOLUTION INTRAMUSCULAR; INTRAVENOUS at 09:15

## 2020-07-19 RX ADMIN — ATORVASTATIN CALCIUM 20 MG: 20 TABLET, FILM COATED ORAL at 09:15

## 2020-07-19 RX ADMIN — HYDRALAZINE HYDROCHLORIDE 50 MG: 25 TABLET, FILM COATED ORAL at 09:15

## 2020-07-19 RX ADMIN — HYDRALAZINE HYDROCHLORIDE 50 MG: 25 TABLET, FILM COATED ORAL at 20:56

## 2020-07-19 RX ADMIN — HEPARIN SODIUM 5000 UNITS: 5000 INJECTION INTRAVENOUS; SUBCUTANEOUS at 14:45

## 2020-07-19 RX ADMIN — HYDRALAZINE HYDROCHLORIDE 50 MG: 25 TABLET, FILM COATED ORAL at 14:45

## 2020-07-19 RX ADMIN — METOPROLOL TARTRATE 50 MG: 50 TABLET, FILM COATED ORAL at 09:15

## 2020-07-19 RX ADMIN — NIFEDIPINE 60 MG: 30 TABLET, EXTENDED RELEASE ORAL at 09:15

## 2020-07-19 RX ADMIN — INSULIN LISPRO 1 UNITS: 100 INJECTION, SOLUTION INTRAVENOUS; SUBCUTANEOUS at 20:56

## 2020-07-19 RX ADMIN — PANTOPRAZOLE SODIUM 40 MG: 40 INJECTION, POWDER, FOR SOLUTION INTRAVENOUS at 09:15

## 2020-07-19 RX ADMIN — INSULIN LISPRO 1 UNITS: 100 INJECTION, SOLUTION INTRAVENOUS; SUBCUTANEOUS at 16:04

## 2020-07-19 RX ADMIN — Medication 10 ML: at 21:02

## 2020-07-19 RX ADMIN — HEPARIN SODIUM 5000 UNITS: 5000 INJECTION INTRAVENOUS; SUBCUTANEOUS at 06:00

## 2020-07-19 RX ADMIN — Medication 500 MG: at 09:15

## 2020-07-19 RX ADMIN — Medication 10 ML: at 09:15

## 2020-07-19 ASSESSMENT — PAIN SCALES - GENERAL
PAINLEVEL_OUTOF10: 0

## 2020-07-19 ASSESSMENT — ENCOUNTER SYMPTOMS
ABDOMINAL PAIN: 0
COUGH: 0
SHORTNESS OF BREATH: 0
CHEST TIGHTNESS: 0
ABDOMINAL DISTENTION: 0
EYE REDNESS: 0
PHOTOPHOBIA: 0
NAUSEA: 0
FACIAL SWELLING: 0
CONSTIPATION: 0
EYE DISCHARGE: 0
VOMITING: 0
BLOOD IN STOOL: 0
DIARRHEA: 0

## 2020-07-19 NOTE — PROGRESS NOTES
CLINICAL BEDSIDE SWALLOWING EVALUATION  Speech Therapy Department    Patient Name:  Mariusz Patel  :  1940  Pain level: Pt did not report pain  Medical Diagnosis:   Acute respiratory failure ([facility] [address] [J96.00]  Acute respiratory failure ([facility] [address] [J96.00]    HPI: Per chart: Mariusz Patel is a 78 y.o. male who presented with ER with unresponsiveness. Per EMS and ED physician patient was on arrival patient has significant comorbid conditions including chronic kidney disease cryoglobulinemia undergoing plasmapheresis outpatient. At the infusion center. Patient apparently was in his usual state. Chronic respiratory failure on 3 L oxygen. Family felt worse last day or so patient was lethargic on arrival.  With tachypnea. Needing intubation. Chest x-ray:  Improvement in the appearance of the chest with some clearing of the    bibasilar opacities.  Large amount of pneumonia remains in the lower right    lung and small amount of opacity remains in the left retrocardiac area either    due to additional pneumonia or atelectasis.  No abnormal vascular congestion. Small bilateral pleural effusions. Treatment Diagnosis: Mild Oropharyngeal Dysphagia    Impressions: Pt was seen sitting upright in bed, he is currently on 2L of O2 via nasal cannula. Pt reported no difficulty with swallowing at baseline. Pt was extubated on 20. Various textures were provided to assess swallow function. Pt presents with mild oropharyngeal dysphagia characterized by mildly prolonged mastication, suspected premature bolus loss to pharynx, mildly delayed swallow initiation, and mildly reduced laryngeal elevation upon manual palpation. Thin liquids via cup revealed no overt clinical s/s of aspiration/penetration when presented in isolation. One episode of coughing was noted following regular solids. Mildly prolonged mastication was assessed with regular solids, adequate oral clearance was achieved.   Recommend use of aspiration precautions with diet tolerance monitoring. Dietary Recommendations: Dysphagia III Soft and Bite-Sized with thin liquids, no straws, meds in puree     Strategies: 90 degree positioning with all p.o. intake; small bites/sips; alternate textures through meal; reduce rate of intake    Treatment/Goals: Speech therapy for dysphagia tx as indicated at Pt is currently in droplet plus isolation for possible COVID-19. ST.) Pt will tolerate recommended diet without s/s of aspiration   2.) If clinical symptoms of penetration/aspiration continue to be noted,Pt will tolerate MBS to r/o aspiration and determine appropriate diet/liquid level. 3.) Pt will tolerate diet advance to least restricted diet, as clinically indicated, with no signs of aspiration     Oral motor Exam:  Dentition: upper and lower dentures  Labial/Facial: within functional limits   Lingual: within functional limits   Voice: within functional limits     Oral Phase:   Mildly prolonged mastication  Suspected premature bolus loss to pharynx    Pharyngeal Phase:  Suspected pharyngeal pooling  Mildly delayed swallow initiation  Mildly decreased laryngeal elevation via palpation   Coughing (delayed) with thin liquids following regular solids    Patient/Family Education:Education, results and recommendations given to the Pt and nurse, who verbalized understanding    Timed Code Treatment: 0 minutes    Total Treatment Time: 23 minutes    Discharge Recommendations: Speech Therapy for Speech/Dysphagia treatment at discharge. If patient discharges prior to next session this note will serve as a discharge summary.      Urszula Drew M.A., [address] [place] [address]  Speech-Language Pathologist

## 2020-07-19 NOTE — PROGRESS NOTES
Pulmonary Medicine Progress Note      Name:  Jerry Feldman Date/Time of Admission: 2020 10:31 AM    CSN: [id number] Attending Provider: Joanne Goodson MD   Room/Bed: [id number]/4006-98 : 1940 Age: 78 y.o. SUBJECTIVE     Patient is seen for reevaluation of   []COPD/COPD exacerbation   []Asthma/asthma exacerbation   [x]Pulmonary nodule/lung mass and/or other abnormal chest imaging   []  Pneumonia   []SOB    []ILD and/or sarcoidosis  []  Respiratory failure  [] Pleural effusion  []  Pulmonary embolism/DVT  [] other:    Extubated on Saturday. Remains on his home 3 L of oxygen. Chart reviewed patient examined imaging reviewed. Denies new complaints. Bilateral, right more than left pleural effusion and right hilar mass on chest imaging. No evidence of infection. Previously negative procalcitonin. Negative cultures. OBJECTIVE         CURRENT MEDS:   hydrALAZINE  50 mg Oral TID    sodium chloride flush  10 mL Intravenous 2 times per day    heparin (porcine)  5,000 Units Subcutaneous 3 times per day    pantoprazole  40 mg Intravenous Daily    metoprolol tartrate  50 mg Oral BID    NIFEdipine  60 mg Oral Daily    atorvastatin  20 mg Oral Daily    aspirin EC  81 mg Oral Daily    tamsulosin  0.4 mg Oral Daily    vitamin C  500 mg Oral Daily    insulin lispro  0-6 Units Subcutaneous Q4H       INTAKE/OUTPUT:  I/O last 3 completed shifts:  In: -   Out: 1600 [Urine:1600]  No intake/output data recorded. VITAL SIGNS:  Current Vital Signs  BP (!) 152/67   Pulse 82   Temp 98.4 °F (36.9 °C) (Temporal)   Resp 20   Ht 5' 7.8\" (1.722 m)   Wt 162 lb (73.5 kg)   SpO2 95%   BMI 24.78 kg/m²   FiO2 : 40 %        Patient was examined via telemedicine to decrease the exposure and preserve PPE. Exam therefore was limited. Constitutional: Well developed  HENT: Head normocephalic, atraumatic; Mucous membranes pink and moist  Eyes: Anicteric sclera.   Neck: Supple, NTrachea midline, no JVD using dragon dictation software. Although every effort was made to ensure the accuracy of this automated transcription, some errors in transcription may have occurred.        Electronically Signed:  Josephine Alfredo MD 7/19/2020 9:41 AM

## 2020-07-19 NOTE — PROGRESS NOTES
[facility]    7/19/2020 2:26 PM        Name: Radha Lawrence . Admitted: 7/16/2020  Primary Care Provider: Dominic Cheng MD (Tel: [phone])                        Subjective:  . No acute events overnight. Resting well. Pain control. Diet ok. Labs reviewed  Denies any chest pain sob.      Reviewed interval ancillary notes    Current Medications  hydrALAZINE (APRESOLINE) tablet 50 mg, TID  sodium chloride flush 0.9 % injection 10 mL, 2 times per day  sodium chloride flush 0.9 % injection 10 mL, PRN  acetaminophen (TYLENOL) tablet 650 mg, Q6H PRN    Or  acetaminophen (TYLENOL) suppository 650 mg, Q6H PRN  polyethylene glycol (GLYCOLAX) packet 17 g, Daily PRN  promethazine (PHENERGAN) tablet 12.5 mg, Q6H PRN    Or  ondansetron (ZOFRAN) injection 4 mg, Q6H PRN  acetaminophen (TYLENOL) tablet 650 mg, Q6H PRN    Or  acetaminophen (TYLENOL) suppository 650 mg, Q6H PRN  heparin (porcine) injection 5,000 Units, 3 times per day  pantoprazole (PROTONIX) injection 40 mg, Daily  hydrALAZINE (APRESOLINE) injection 10 mg, Q6H PRN  metoprolol tartrate (LOPRESSOR) tablet 50 mg, BID  NIFEdipine (PROCARDIA XL) extended release tablet 60 mg, Daily  atorvastatin (LIPITOR) tablet 20 mg, Daily  aspirin EC tablet 81 mg, Daily  tamsulosin (FLOMAX) capsule 0.4 mg, Daily  vitamin C (ASCORBIC ACID) tablet 500 mg, Daily  glucose (GLUTOSE) 40 % oral gel 15 g, PRN  dextrose 50 % IV solution, PRN  glucagon (rDNA) injection 1 mg, PRN  dextrose 5 % solution, PRN  insulin lispro (1 Unit Dial) 0-6 Units, Q4H        Objective:  BP (!) 140/61   Pulse 78   Temp 98.2 °F (36.8 °C) (Temporal)   Resp 16   Ht 5' 7.8\" (1.722 m)   Wt 162 lb (73.5 kg)   SpO2 94%   BMI 24.78 kg/m²     Intake/Output Summary (Last 24 hours) at 7/19/2020 1426  Last data filed at 7/19/2020 0532  Gross per 24 hour   Intake --   Output 1600 ml   Net -1600 ml      Wt Readings from Last 3 Encounters:   07/19/20 162 lb (73.5 kg)   01/27/20 164 lb 6.4 oz (74.6 kg)   01/06/20 167 lb 3.2 oz (75.8 kg)       General appearance:  Appears comfortable  Eyes: Sclera clear. Pupils equal.  ENT: Moist oral mucosa. Trachea midline, no adenopathy. Cardiovascular: Regular rhythm, normal S1, S2. No murmur. No edema in lower extremities  Respiratory: Not using accessory muscles. Good inspiratory effort. Clear to auscultation bilaterally, no wheeze or crackles. GI: Abdomen soft, no tenderness, not distended, normal bowel sounds  Musculoskeletal: No cyanosis in digits, neck supple  Neurology: CN 2-12 grossly intact. No speech or motor deficits  Psych: Normal affect.  Alert and oriented in time, place and person  Skin: Warm, dry, normal turgor    Labs and Tests:  CBC:   Recent Labs     07/17/20  0455 07/17/20  1610 07/18/20  0445 07/19/20  0600   WBC 6.1  --  10.2 8.6   HGB 7.0* 8.1* 7.8* 8.5*     --  183 195     BMP:    Recent Labs     07/17/20 0455 07/18/20 0445 07/19/20  0600    142 145   K 4.1 4.2 3.9    104 105   CO2 27 29 30   BUN 50* 49* 42*   CREATININE 3.8* 3.7* 3.7*   GLUCOSE 158* 114* 104*     Hepatic:   Recent Labs     07/17/20 0455 07/18/20  0445 07/19/20  0600   AST 35 18 13*   ALT 64* 42* 29   BILITOT <0.2 <0.2 0.3   ALKPHOS 69 68 65       Discussed care with family and patient             Spent 30  minutes with patient and family at bedside and on unit reviewing medical records and labs, spent greater than 50% time counseling patient and family on diagnosis and plan   Problem List  Active Problems:    Diabetes mellitus type 2 with complications ([facility] [address]    Essential hypertension    Raynaud's disease    Type 2 diabetes mellitus with diabetic nephropathy (HCC)    Chronic renal insufficiency, stage 3 (moderate) (HCC)    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 *       Assessment & Plan:   1. Acute hypoxic respiratory failure  -Which is likely multifactorial probably acute compensation with underlying chronic lung disease  -Needed intubation on arrival patient has been extubated still pulse oxing 89%   -Continue to titrate to keep oxygen below 90. Lung mass with hilar lymphadenopathy  -Concerning for recurrence of cancer.  -We will need outpatient oncology follow-up that he follows at   -Antibiotics has been discontinued    Bilateral pleural effusion    Acute on chronic kidney disease  -Nephrology following with history of MGUS        Diet: DIET DYSPHAGIA SOFT AND BITE-SIZED;  No Drinking Straw  Code:Full Code  DVT PPX lovenox   disposition-PT OT transfer out of ICU discharge Tonny De Santiago MD   7/19/2020 2:26 PM

## 2020-07-19 NOTE — PROGRESS NOTES
intolerance, heat intolerance and polyuria. Genitourinary: Negative for decreased urine volume, difficulty urinating, flank pain and hematuria. Musculoskeletal: Negative for joint swelling and neck pain. Neurological: Negative for dizziness, seizures, syncope, speech difficulty, light-headedness and headaches. Hematological: Does not bruise/bleed easily. Psychiatric/Behavioral: Negative for agitation, confusion and hallucinations. Objective:      BP (!) 152/67   Pulse 82   Temp 98.4 °F (36.9 °C) (Temporal)   Resp 20   Ht 5' 7.8\" (1.722 m)   Wt 162 lb (73.5 kg)   SpO2 95%   BMI 24.78 kg/m²     Wt Readings from Last 3 Encounters:   07/19/20 162 lb (73.5 kg)   01/27/20 164 lb 6.4 oz (74.6 kg)   01/06/20 167 lb 3.2 oz (75.8 kg)       BP Readings from Last 3 Encounters:   07/19/20 (!) 152/67   01/27/20 130/70   01/06/20 110/60     Chest- clear, has tunneled catheter  Heart-regular  Abd-soft  Ext- no edema  -+Han    Labs  Hemoglobin   Date Value Ref Range Status   07/19/2020 8.5 (L) 13.5 - 17.5 g/dL Final     Hematocrit   Date Value Ref Range Status   07/19/2020 26.2 (L) 40.5 - 52.5 % Final     WBC   Date Value Ref Range Status   07/19/2020 8.6 4.0 - 11.0 K/uL Final     Platelets   Date Value Ref Range Status   07/19/2020 195 135 - 450 K/uL Final     Lab Results   Component Value Date    CREATININE 3.7 (H) 07/19/2020    BUN 42 (H) 07/19/2020     07/19/2020    K 3.9 07/19/2020     07/19/2020    CO2 30 07/19/2020       Assessment/Plan:  1. Acute kidney injury on CKD IV, baseline creatinine 3.4. Follows  with  Nephrology. Renal function is relatively stable today and he is  nonoliguric. 2.  Hyperkalemia, improved. 3.  Hypertension. Variable. Increased Hydralazine. Holding parameters. 4.  MGUS with cryoglobulinemia. Hematology has been consulted. Discussed with Hematology if needed to continue Plasmapheresis, hold for now and await cryoglobulin assay. 5.  Anemia. Hematology is following. ?MARTI. Hgb better. 6.  Acute respiratory failure, better  7. Diabetes mellitus. Management as per Medicine. Discussed with nurse.      Clementine Barajas MD

## 2020-07-19 NOTE — PLAN OF CARE
Problem: Falls - Risk of:  Goal: Will remain free from falls  Description: Will remain free from falls  Outcome: Ongoing  Goal: Absence of physical injury  Description: Absence of physical injury  Outcome: Ongoing     Problem: Skin Integrity:  Goal: Will show no infection signs and symptoms  Description: Will show no infection signs and symptoms  Outcome: Ongoing  Goal: Absence of new skin breakdown  Description: Absence of new skin breakdown  Outcome: Ongoing     Problem: Nutritional:  Goal: Maintenance of adequate nutrition will improve  Description: Maintenance of adequate nutrition will improve  Outcome: Ongoing

## 2020-07-19 NOTE — PROGRESS NOTES
Oncology Hematology Care   Progress Note      7/19/2020 9:31 AM        Name: Karlo Snow . Admitted: 7/16/2020    SUBJECTIVE:  PT is extubated  I did some review of his records from   He is on velcade dex for his cryoglob-pt states he had a dose tues and missed thurs?   PHeresis is held -per my sign out dr Jeanie Iglesias spoke to dr Annabel Christianson who was going to hold pheresis  Based on my eval-the pt has no emergent need for pheresis today   HE has normal mental status (thus doubt hyperviscosity ) and has no refractory organ failure/ulcers etc  THe velcade dex is actually more of a tx to rid the cryoglobulins while the pheresis is a supportive role     Current Medications  hydrALAZINE (APRESOLINE) tablet 50 mg, TID  sodium chloride flush 0.9 % injection 10 mL, 2 times per day  sodium chloride flush 0.9 % injection 10 mL, PRN  acetaminophen (TYLENOL) tablet 650 mg, Q6H PRN    Or  acetaminophen (TYLENOL) suppository 650 mg, Q6H PRN  polyethylene glycol (GLYCOLAX) packet 17 g, Daily PRN  promethazine (PHENERGAN) tablet 12.5 mg, Q6H PRN    Or  ondansetron (ZOFRAN) injection 4 mg, Q6H PRN  acetaminophen (TYLENOL) tablet 650 mg, Q6H PRN    Or  acetaminophen (TYLENOL) suppository 650 mg, Q6H PRN  heparin (porcine) injection 5,000 Units, 3 times per day  pantoprazole (PROTONIX) injection 40 mg, Daily  hydrALAZINE (APRESOLINE) injection 10 mg, Q6H PRN  metoprolol tartrate (LOPRESSOR) tablet 50 mg, BID  NIFEdipine (PROCARDIA XL) extended release tablet 60 mg, Daily  atorvastatin (LIPITOR) tablet 20 mg, Daily  aspirin EC tablet 81 mg, Daily  tamsulosin (FLOMAX) capsule 0.4 mg, Daily  vitamin C (ASCORBIC ACID) tablet 500 mg, Daily  glucose (GLUTOSE) 40 % oral gel 15 g, PRN  dextrose 50 % IV solution, PRN  glucagon (rDNA) injection 1 mg, PRN  dextrose 5 % solution, PRN  insulin lispro (1 Unit Dial) 0-6 Units, Q4H        Objective:  BP (!) 152/67   Pulse 82   Temp 98.4 °F (36.9 °C) (Temporal)   Resp 20   Ht 5' 7.8\" (1.722 m)   Wt 162 lb (73.5 kg)   SpO2 95%   BMI 24.78 kg/m²     Intake/Output Summary (Last 24 hours) at 7/19/2020 0931  Last data filed at 7/19/2020 0412  Gross per 24 hour   Intake --   Output 1600 ml   Net -1600 ml      Wt Readings from Last 3 Encounters:   07/19/20 162 lb (73.5 kg)   01/27/20 164 lb 6.4 oz (74.6 kg)   01/06/20 167 lb 3.2 oz (75.8 kg)        General appearance:  Appears comfortable  Eyes: Sclera clear. Pupils equal.  ENT: Moist oral mucosa. Trachea midline, no adenopathy. Cardiovascular: Regular rhythm, normal S1, S2. No murmur. No edema in lower extremities  Respiratory: Not using accessory muscles. Good inspiratory effort. Clear to auscultation bilaterally, no wheeze or crackles. GI: Abdomen soft, no tenderness, not distended, normal bowel sounds  Musculoskeletal: No cyanosis in digits, neck supple  Neurology: Remains intubated sedated. S  Psych: Normal affect. Skin: Warm, dry, normal turgor  Extremity exam shows brisk capillary refill.   Peripheral pulses are palpable in lower extremities     Labs and Tests:  CBC:   Recent Labs     07/17/20  0455 07/17/20  1610 07/18/20  0445 07/19/20  0600   WBC 6.1  --  10.2 8.6   HGB 7.0* 8.1* 7.8* 8.5*     --  183 195     BMP:    Recent Labs     07/17/20  0455 07/18/20  0445 07/19/20  0600    142 145   K 4.1 4.2 3.9    104 105   CO2 27 29 30   BUN 50* 49* 42*   CREATININE 3.8* 3.7* 3.7*   GLUCOSE 158* 114* 104*     Hepatic:   Recent Labs     07/17/20  0455 07/18/20  0445 07/19/20  0600   AST 35 18 13*   ALT 64* 42* 29   BILITOT <0.2 <0.2 0.3   ALKPHOS 69 68 65       ASSESSMENT AND PLAN    Active Problems:    Diabetes mellitus type 2 with complications ([address]    Essential hypertension    Raynaud's disease    Type 2 diabetes mellitus with diabetic nephropathy (HCC)    Chronic renal insufficiency, stage 3 (moderate) (HCC)    Malignant neoplasm of upper lobe of lung (HCC)    Acute respiratory failure (HCC)  Resolved Problems:    * No the family about best supportive care due to multiple medical issues.

## 2020-07-20 ENCOUNTER — APPOINTMENT (OUTPATIENT)
Dept: INTERVENTIONAL RADIOLOGY/VASCULAR | Age: 80
DRG: 208 | End: 2020-07-20
Payer: MEDICARE

## 2020-07-20 ENCOUNTER — APPOINTMENT (OUTPATIENT)
Dept: GENERAL RADIOLOGY | Age: 80
DRG: 208 | End: 2020-07-20
Payer: MEDICARE

## 2020-07-20 LAB
A/G RATIO: 1.4 (ref 1.1–2.2)
ALBUMIN SERPL-MCNC: 2.7 G/DL (ref 3.1–4.9)
ALBUMIN SERPL-MCNC: 3.2 G/DL (ref 3.4–5)
ALP BLD-CCNC: 65 U/L (ref 40–129)
ALPHA-1-GLOBULIN: 0.3 G/DL (ref 0.2–0.4)
ALPHA-2-GLOBULIN: 0.7 G/DL (ref 0.4–1.1)
ALT SERPL-CCNC: 21 U/L (ref 10–40)
ANCA IFA: NORMAL
ANION GAP SERPL CALCULATED.3IONS-SCNC: 8 MMOL/L (ref 3–16)
APPEARANCE FLUID: CLEAR
APTT: 31 SEC (ref 24.2–36.2)
AST SERPL-CCNC: 12 U/L (ref 15–37)
BASOPHILS ABSOLUTE: 0 K/UL (ref 0–0.2)
BASOPHILS RELATIVE PERCENT: 0.3 %
BETA GLOBULIN: 0.7 G/DL (ref 0.9–1.6)
BILIRUB SERPL-MCNC: 0.3 MG/DL (ref 0–1)
BLOOD CULTURE, ROUTINE: NORMAL
BUN BLDV-MCNC: 39 MG/DL (ref 7–20)
CALCIUM SERPL-MCNC: 9.4 MG/DL (ref 8.3–10.6)
CELL COUNT FLUID TYPE: NORMAL
CHLORIDE BLD-SCNC: 105 MMOL/L (ref 99–110)
CLOT EVALUATION: NORMAL
CO2: 30 MMOL/L (ref 21–32)
COLOR FLUID: NORMAL
CREAT SERPL-MCNC: 3.4 MG/DL (ref 0.8–1.3)
CULTURE, BLOOD 2: NORMAL
D DIMER: 807 NG/ML DDU (ref 0–229)
EOSINOPHILS ABSOLUTE: 0.3 K/UL (ref 0–0.6)
EOSINOPHILS RELATIVE PERCENT: 3.7 %
FIBRINOGEN: 381 MG/DL (ref 200–397)
FLUID TYPE: NORMAL
GAMMA GLOBULIN: 0.6 G/DL (ref 0.6–1.8)
GFR AFRICAN AMERICAN: 21
GFR NON-AFRICAN AMERICAN: 17
GLOBULIN: 2.3 G/DL
GLUCOSE BLD-MCNC: 138 MG/DL (ref 70–99)
GLUCOSE BLD-MCNC: 144 MG/DL (ref 70–99)
GLUCOSE BLD-MCNC: 147 MG/DL (ref 70–99)
GLUCOSE BLD-MCNC: 177 MG/DL (ref 70–99)
GLUCOSE BLD-MCNC: 206 MG/DL (ref 70–99)
GLUCOSE, FLUID: 183 MG/DL
HCT VFR BLD CALC: 28 % (ref 40.5–52.5)
HEMOGLOBIN: 9 G/DL (ref 13.5–17.5)
INR BLD: 0.93 (ref 0.86–1.14)
LD, FLUID: 119 U/L
LYMPHOCYTES ABSOLUTE: 1 K/UL (ref 1–5.1)
LYMPHOCYTES RELATIVE PERCENT: 11.6 %
LYMPHOCYTES, BODY FLUID: 48 %
MACROPHAGE FLUID: 31 %
MCH RBC QN AUTO: 27.8 PG (ref 26–34)
MCHC RBC AUTO-ENTMCNC: 32.1 G/DL (ref 31–36)
MCV RBC AUTO: 86.7 FL (ref 80–100)
MONOCYTES ABSOLUTE: 1 K/UL (ref 0–1.3)
MONOCYTES RELATIVE PERCENT: 12 %
NEUTROPHIL, FLUID: 21 %
NEUTROPHILS ABSOLUTE: 6.1 K/UL (ref 1.7–7.7)
NEUTROPHILS RELATIVE PERCENT: 72.4 %
NUCLEATED CELLS FLUID: 269 /CUMM
NUMBER OF CELLS COUNTED FLUID: 100
PDW BLD-RTO: 17.2 % (ref 12.4–15.4)
PERFORMED ON: ABNORMAL
PLATELET # BLD: 199 K/UL (ref 135–450)
PMV BLD AUTO: 9.1 FL (ref 5–10.5)
POTASSIUM REFLEX MAGNESIUM: 4 MMOL/L (ref 3.5–5.1)
PROTEIN FLUID: 2.1 G/DL
PROTHROMBIN TIME: 10.8 SEC (ref 10–13.2)
RBC # BLD: 3.23 M/UL (ref 4.2–5.9)
RBC FLUID: <1000 /CUMM
REASON FOR REJECTION: NORMAL
REJECTED TEST: NORMAL
SODIUM BLD-SCNC: 143 MMOL/L (ref 136–145)
TOTAL PROTEIN: 4.9 G/DL (ref 6.4–8.2)
TOTAL PROTEIN: 5.5 G/DL (ref 6.4–8.2)
WBC # BLD: 8.4 K/UL (ref 4–11)

## 2020-07-20 PROCEDURE — 0W993ZZ DRAINAGE OF RIGHT PLEURAL CAVITY, PERCUTANEOUS APPROACH: ICD-10-PCS | Performed by: RADIOLOGY

## 2020-07-20 PROCEDURE — 2580000003 HC RX 258: Performed by: HOSPITALIST

## 2020-07-20 PROCEDURE — 6370000000 HC RX 637 (ALT 250 FOR IP): Performed by: INTERNAL MEDICINE

## 2020-07-20 PROCEDURE — 85730 THROMBOPLASTIN TIME PARTIAL: CPT

## 2020-07-20 PROCEDURE — 94760 N-INVAS EAR/PLS OXIMETRY 1: CPT

## 2020-07-20 PROCEDURE — 97530 THERAPEUTIC ACTIVITIES: CPT

## 2020-07-20 PROCEDURE — 99233 SBSQ HOSP IP/OBS HIGH 50: CPT | Performed by: INTERNAL MEDICINE

## 2020-07-20 PROCEDURE — 84157 ASSAY OF PROTEIN OTHER: CPT

## 2020-07-20 PROCEDURE — 2500000003 HC RX 250 WO HCPCS: Performed by: INTERNAL MEDICINE

## 2020-07-20 PROCEDURE — C9113 INJ PANTOPRAZOLE SODIUM, VIA: HCPCS | Performed by: INTERNAL MEDICINE

## 2020-07-20 PROCEDURE — 32555 ASPIRATE PLEURA W/ IMAGING: CPT

## 2020-07-20 PROCEDURE — 6370000000 HC RX 637 (ALT 250 FOR IP): Performed by: HOSPITALIST

## 2020-07-20 PROCEDURE — 85025 COMPLETE CBC W/AUTO DIFF WBC: CPT

## 2020-07-20 PROCEDURE — 6370000000 HC RX 637 (ALT 250 FOR IP): Performed by: PHYSICIAN ASSISTANT

## 2020-07-20 PROCEDURE — 80053 COMPREHEN METABOLIC PANEL: CPT

## 2020-07-20 PROCEDURE — 92526 ORAL FUNCTION THERAPY: CPT

## 2020-07-20 PROCEDURE — 6360000002 HC RX W HCPCS: Performed by: HOSPITALIST

## 2020-07-20 PROCEDURE — 97162 PT EVAL MOD COMPLEX 30 MIN: CPT

## 2020-07-20 PROCEDURE — 2700000000 HC OXYGEN THERAPY PER DAY

## 2020-07-20 PROCEDURE — 71045 X-RAY EXAM CHEST 1 VIEW: CPT

## 2020-07-20 PROCEDURE — 85384 FIBRINOGEN ACTIVITY: CPT

## 2020-07-20 PROCEDURE — 36592 COLLECT BLOOD FROM PICC: CPT

## 2020-07-20 PROCEDURE — 88112 CYTOPATH CELL ENHANCE TECH: CPT

## 2020-07-20 PROCEDURE — 83615 LACTATE (LD) (LDH) ENZYME: CPT

## 2020-07-20 PROCEDURE — 82945 GLUCOSE OTHER FLUID: CPT

## 2020-07-20 PROCEDURE — 85610 PROTHROMBIN TIME: CPT

## 2020-07-20 PROCEDURE — 85379 FIBRIN DEGRADATION QUANT: CPT

## 2020-07-20 PROCEDURE — 97166 OT EVAL MOD COMPLEX 45 MIN: CPT

## 2020-07-20 PROCEDURE — 89051 BODY FLUID CELL COUNT: CPT

## 2020-07-20 PROCEDURE — C1729 CATH, DRAINAGE: HCPCS

## 2020-07-20 PROCEDURE — 1200000000 HC SEMI PRIVATE

## 2020-07-20 PROCEDURE — 6360000002 HC RX W HCPCS: Performed by: INTERNAL MEDICINE

## 2020-07-20 PROCEDURE — 88305 TISSUE EXAM BY PATHOLOGIST: CPT

## 2020-07-20 PROCEDURE — 97535 SELF CARE MNGMENT TRAINING: CPT

## 2020-07-20 RX ORDER — HYDRALAZINE HYDROCHLORIDE 25 MG/1
75 TABLET, FILM COATED ORAL 3 TIMES DAILY
Status: DISCONTINUED | OUTPATIENT
Start: 2020-07-20 | End: 2020-07-22 | Stop reason: HOSPADM

## 2020-07-20 RX ORDER — ACYCLOVIR 200 MG/1
400 CAPSULE ORAL 2 TIMES DAILY
Status: DISCONTINUED | OUTPATIENT
Start: 2020-07-20 | End: 2020-07-22 | Stop reason: HOSPADM

## 2020-07-20 RX ORDER — LIDOCAINE HYDROCHLORIDE 10 MG/ML
5 INJECTION, SOLUTION EPIDURAL; INFILTRATION; INTRACAUDAL; PERINEURAL ONCE
Status: COMPLETED | OUTPATIENT
Start: 2020-07-20 | End: 2020-07-20

## 2020-07-20 RX ORDER — DEXAMETHASONE 4 MG/1
20 TABLET ORAL ONCE
Status: COMPLETED | OUTPATIENT
Start: 2020-07-21 | End: 2020-07-21

## 2020-07-20 RX ADMIN — ACYCLOVIR 400 MG: 200 CAPSULE ORAL at 16:47

## 2020-07-20 RX ADMIN — PANTOPRAZOLE SODIUM 40 MG: 40 INJECTION, POWDER, FOR SOLUTION INTRAVENOUS at 09:34

## 2020-07-20 RX ADMIN — Medication 10 ML: at 22:30

## 2020-07-20 RX ADMIN — TAMSULOSIN HYDROCHLORIDE 0.4 MG: 0.4 CAPSULE ORAL at 09:33

## 2020-07-20 RX ADMIN — Medication 10 ML: at 09:34

## 2020-07-20 RX ADMIN — INSULIN LISPRO 2 UNITS: 100 INJECTION, SOLUTION INTRAVENOUS; SUBCUTANEOUS at 12:17

## 2020-07-20 RX ADMIN — HYDRALAZINE HYDROCHLORIDE 50 MG: 25 TABLET, FILM COATED ORAL at 09:34

## 2020-07-20 RX ADMIN — LIDOCAINE HYDROCHLORIDE 5 ML: 10 INJECTION, SOLUTION EPIDURAL; INFILTRATION; INTRACAUDAL; PERINEURAL at 16:10

## 2020-07-20 RX ADMIN — INSULIN LISPRO 1 UNITS: 100 INJECTION, SOLUTION INTRAVENOUS; SUBCUTANEOUS at 09:34

## 2020-07-20 RX ADMIN — INSULIN LISPRO 1 UNITS: 100 INJECTION, SOLUTION INTRAVENOUS; SUBCUTANEOUS at 22:30

## 2020-07-20 RX ADMIN — HYDRALAZINE HYDROCHLORIDE 75 MG: 25 TABLET, FILM COATED ORAL at 22:29

## 2020-07-20 RX ADMIN — HYDRALAZINE HYDROCHLORIDE 75 MG: 25 TABLET, FILM COATED ORAL at 16:47

## 2020-07-20 RX ADMIN — HYDRALAZINE HYDROCHLORIDE 10 MG: 20 INJECTION INTRAMUSCULAR; INTRAVENOUS at 00:09

## 2020-07-20 RX ADMIN — NIFEDIPINE 60 MG: 30 TABLET, EXTENDED RELEASE ORAL at 09:33

## 2020-07-20 RX ADMIN — HEPARIN SODIUM 5000 UNITS: 5000 INJECTION INTRAVENOUS; SUBCUTANEOUS at 06:26

## 2020-07-20 RX ADMIN — ASPIRIN 81 MG: 81 TABLET, COATED ORAL at 09:33

## 2020-07-20 RX ADMIN — HEPARIN SODIUM 5000 UNITS: 5000 INJECTION INTRAVENOUS; SUBCUTANEOUS at 16:47

## 2020-07-20 RX ADMIN — HYDRALAZINE HYDROCHLORIDE 10 MG: 20 INJECTION INTRAMUSCULAR; INTRAVENOUS at 06:16

## 2020-07-20 RX ADMIN — ATORVASTATIN CALCIUM 20 MG: 20 TABLET, FILM COATED ORAL at 09:33

## 2020-07-20 RX ADMIN — INSULIN LISPRO 1 UNITS: 100 INJECTION, SOLUTION INTRAVENOUS; SUBCUTANEOUS at 18:01

## 2020-07-20 RX ADMIN — METOPROLOL TARTRATE 50 MG: 50 TABLET, FILM COATED ORAL at 09:34

## 2020-07-20 RX ADMIN — ACYCLOVIR 400 MG: 200 CAPSULE ORAL at 22:29

## 2020-07-20 RX ADMIN — METOPROLOL TARTRATE 50 MG: 50 TABLET, FILM COATED ORAL at 22:29

## 2020-07-20 RX ADMIN — Medication 500 MG: at 09:33

## 2020-07-20 RX ADMIN — HEPARIN SODIUM 5000 UNITS: 5000 INJECTION INTRAVENOUS; SUBCUTANEOUS at 22:30

## 2020-07-20 ASSESSMENT — PAIN SCALES - GENERAL
PAINLEVEL_OUTOF10: 0

## 2020-07-20 NOTE — PROGRESS NOTES
ambulate >2 steps and became slightly agitated when offered, but participated well in EOB activity. Pt educated on benefits of OOB activity and influence of participation in therapy on d/c recommendations. Pt understands current recommendation for SNF to increase strength prior to returning home. Pt reports he may be ready to participate in functional mobility more later. Safety Devices  Safety Devices in place: Yes  Type of devices: Left in bed; All fall risk precautions in place; Bed alarm in place;Nurse notified;Call light within reach;Gait belt  Restraints  Initially in place: No           Patient Diagnosis(es): The primary encounter diagnosis was Pneumonia due to organism. Diagnoses of Suspected COVID-19 virus infection, Acute on chronic congestive heart failure, unspecified heart failure type ([address] Acute respiratory failure with hypoxia ([address] and Accelerated hypertension were also pertinent to this visit. has a past medical history of CAD (coronary artery disease), Hyperlipidemia, Hypertension, and Type II or unspecified type diabetes mellitus without mention of complication, not stated as uncontrolled. has a past surgical history that includes Coronary angioplasty with stent (2004). Treatment Diagnosis: Decreased functional status secondary to acute respiratory failure      Restrictions  Restrictions/Precautions  Restrictions/Precautions: Fall Risk(high)  Required Braces or Orthoses?: No  Position Activity Restriction  Other position/activity restrictions: Stewart Villavicencio is a 78 y.o. male  who presents to the ED complaining of concern for acute decompensation of respiratory failure. The patient became unresponsive earlier this morning and having worsening respiratory distress. He had hypoxia in the 50% range in 60% range per EMS and home health visits. Increasing his supplemental home oxygen did not really improve much.   Apparently he is physically fairly debilitated but mentally pretty sharp according to 2 sons whom I spoke with on the phone. No reports of any fevers. History is severely limited as the patient is unable to provide any himself and was intubated shortly after arrival.  As such I am able to determine from chart review that he does have a history of lung cancer and cryoglobulinemia and gets plasmapheresis treatments and has severe chronic kidney disease but has not been on dialysis yet. Subjective   General  Chart Reviewed: Yes  Patient assessed for rehabilitation services?: Yes  Family / Caregiver Present: No  Diagnosis: acute respiratory failure  Subjective  Subjective: Pt supine in bed upon arrival; agreeable to eval with some encouragement.   Patient Currently in Pain: Denies  Vital Signs  BP: 122/61  Patient Currently in Pain: Denies  Social/Functional History  Social/Functional History  Lives With: Spouse  Type of Home: Apartment  Home Layout: One level  Home Access: Stairs to enter without rails  Entrance Stairs - Number of Steps: 1 LESLEY  Bathroom Shower/Tub: Walk-in shower, Shower chair with back  Bathroom Equipment: Shower chair, Grab bars in shower  Home Equipment: Cane, Standard walker, Rolling walker  ADL Assistance: Independent  Homemaking Responsibilities: No(wife)  Ambulation Assistance: Independent(with cane)  Transfer Assistance: Independent  Additional Comments: Pt is a questionable historian       Objective   Vision: Within Functional Limits  Hearing: Within functional limits    Orientation  Overall Orientation Status: Impaired  Orientation Level: Oriented to place;Oriented to person;Disoriented to time;Disoriented to situation  Observation/Palpation  Posture: Fair  Balance  Sitting Balance: Supervision(min to supervision following VC to correct BENSON following L lean)  Standing Balance: Contact guard assistance  Standing Balance  Time: ~30 sec, ~1 min  Activity: LB dressing, transfer, functional mobility  Comment: RW  Functional Mobility  Functional - Mobility Device: Rolling Walker  Activity: Other(2 steps at EOB)  Assist Level: Contact guard assistance  Functional Mobility Comments: Pt refused further ambulation stating he is \"not ready;\" no LOB; pt reports he may be ready to ambulate more tomorrow  ADL  Grooming: Minimal assistance(oral care, denture management)  UE Dressing: Minimal assistance(doff/don gown)  LE Dressing: Moderate assistance;Stand by assistance(mod A for donning brief; SBA for donning socks)  Toileting: Dependent/Total(harvey catheter in place)  Additional Comments: ADLs completed EOB due to pt refusal to ambulate  Tone RUE  RUE Tone: Normotonic  Tone LUE  LUE Tone: Normotonic  Coordination  Movements Are Fluid And Coordinated: Yes     Bed mobility  Supine to Sit: Minimal assistance  Sit to Supine: Contact guard assistance  Scooting: Contact guard assistance  Transfers  Sit to stand: Contact guard assistance  Stand to sit: Contact guard assistance  Transfer Comments: x2 to/from EOB  Vision - Basic Assessment  Prior Vision: No visual deficits  Patient Visual Report: No visual complaint reported. Cognition  Overall Cognitive Status: Exceptions  Arousal/Alertness: Appropriate responses to stimuli  Following Commands:  Follows one step commands consistently  Attention Span: Difficulty dividing attention  Memory: Decreased recall of recent events  Safety Judgement: Decreased awareness of need for safety;Decreased awareness of need for assistance  Problem Solving: Assistance required to identify errors made;Assistance required to correct errors made;Assistance required to generate solutions;Assistance required to implement solutions;Decreased awareness of errors  Insights: Decreased awareness of deficits  Initiation: Requires cues for some  Sequencing: Requires cues for some  Perception  Overall Perceptual Status: WFL     Sensation  Overall Sensation Status: WFL        LUE AROM (degrees)  LUE AROM : WFL  Left Hand AROM (degrees)  Left Hand AROM: WFL  RUE AROM (degrees)  RUE AROM : WFL  Right Hand AROM (degrees)  Right Hand AROM: WFL  LUE Strength  Gross LUE Strength: WFL  RUE Strength  Gross RUE Strength: WFL                   Plan   Plan  Times per week: 3-5  Times per day: Daily  Current Treatment Recommendations: Functional Mobility Training, Safety Education & Training, Self-Care / ADL, Cognitive/Perceptual Training, Cognitive Reorientation, Balance Training    G-Code     OutComes Score                                                  AM-PAC Score        AM-PAC Inpatient Daily Activity Raw Score: 15 (07/20/20 1655)  AM-PAC Inpatient ADL T-Scale Score : 34.69 (07/20/20 1655)  ADL Inpatient CMS 0-100% Score: 56.46 (07/20/20 1655)  ADL Inpatient CMS G-Code Modifier : CK (07/20/20 1655)    Goals  Short term goals  Time Frame for Short term goals: d/c  Short term goal 1: Pt will complete bed mobility mod I. Short term goal 2: Pt will complete functional transfer with supervision. Short term goal 3: Pt will complete functional mobility with supervision. Short term goal 4: Pt will complete 5-minute functional task in stance with SBA. Short term goal 5: Pt will complete LB ADLs with min A.   Long term goals  Time Frame for Long term goals : LTG=STG  Patient Goals   Patient goals : get stronger       Therapy Time   Individual Concurrent Group Co-treatment   Time In 0829         Time Out 0912         Minutes 43         Timed Code Treatment Minutes: [address] [id number]

## 2020-07-20 NOTE — PROGRESS NOTES
Pt bp was high @ 173/69 and PRN medication given per protocol,reassessment completed, patient denies needs at this time, call light in reach, will continue to monitor.

## 2020-07-20 NOTE — PROGRESS NOTES
Oncology and Hematology Care   Progress Note      7/20/2020 2:51 PM        Name: Alissa Hart . Admitted: 7/16/2020    SUBJECTIVE:  Doing okay. No shortness of breath. Hgb stable.      Reviewed interval ancillary notes    Current Medications  hydrALAZINE (APRESOLINE) tablet 75 mg, TID  [START ON 7/21/2020] bortezomib (VELCADE) 2.45 mg in sodium chloride (PF) 0.98 mL chemo subcutaneous syringe, Once  acyclovir (ZOVIRAX) capsule 400 mg, BID  [START ON 7/21/2020] dexamethasone (DECADRON) tablet 20 mg, Once  insulin lispro (1 Unit Dial) 0-6 Units, TID WC  insulin lispro (1 Unit Dial) 0-3 Units, Nightly  sodium chloride flush 0.9 % injection 10 mL, 2 times per day  sodium chloride flush 0.9 % injection 10 mL, PRN  acetaminophen (TYLENOL) tablet 650 mg, Q6H PRN    Or  acetaminophen (TYLENOL) suppository 650 mg, Q6H PRN  polyethylene glycol (GLYCOLAX) packet 17 g, Daily PRN  promethazine (PHENERGAN) tablet 12.5 mg, Q6H PRN    Or  ondansetron (ZOFRAN) injection 4 mg, Q6H PRN  acetaminophen (TYLENOL) tablet 650 mg, Q6H PRN    Or  acetaminophen (TYLENOL) suppository 650 mg, Q6H PRN  heparin (porcine) injection 5,000 Units, 3 times per day  pantoprazole (PROTONIX) injection 40 mg, Daily  hydrALAZINE (APRESOLINE) injection 10 mg, Q6H PRN  metoprolol tartrate (LOPRESSOR) tablet 50 mg, BID  NIFEdipine (PROCARDIA XL) extended release tablet 60 mg, Daily  atorvastatin (LIPITOR) tablet 20 mg, Daily  aspirin EC tablet 81 mg, Daily  tamsulosin (FLOMAX) capsule 0.4 mg, Daily  vitamin C (ASCORBIC ACID) tablet 500 mg, Daily  glucose (GLUTOSE) 40 % oral gel 15 g, PRN  dextrose 50 % IV solution, PRN  glucagon (rDNA) injection 1 mg, PRN  dextrose 5 % solution, PRN        Objective:  /60   Pulse 81   Temp 98.6 °F (37 °C) (Temporal)   Resp 18   Ht 5' 7.8\" (1.722 m)   Wt 162 lb 9.6 oz (73.8 kg)   SpO2 (!) 89%   BMI 24.87 kg/m²     Intake/Output Summary (Last 24 hours) at 7/20/2020 3324  Last data filed at 7/20/2020 0000  Gross per 24 hour   Intake --   Output 900 ml   Net -900 ml      Wt Readings from Last 3 Encounters:   07/20/20 162 lb 9.6 oz (73.8 kg)   01/27/20 164 lb 6.4 oz (74.6 kg)   01/06/20 167 lb 3.2 oz (75.8 kg)       Physical exam deferred d/t COVID 19 r/o    Labs and Tests:  CBC:   Recent Labs     07/18/20 0445 07/19/20  0600 07/20/20  0800   WBC 10.2 8.6 8.4   HGB 7.8* 8.5* 9.0*    195 199     BMP:    Recent Labs     07/18/20 0445 07/19/20  0600 07/20/20  0800    145 143   K 4.2 3.9 4.0    105 105   CO2 29 30 30   BUN 49* 42* 39*   CREATININE 3.7* 3.7* 3.4*   GLUCOSE 114* 104* 138*     Hepatic:   Recent Labs     07/18/20 0445 07/19/20  0600 07/20/20  0800   AST 18 13* 12*   ALT 42* 29 21   BILITOT <0.2 0.3 0.3   ALKPHOS 68 65 65       ASSESSMENT AND PLAN    Active Problems:    Diabetes mellitus type 2 with complications (HCC)    Essential hypertension    Raynaud's disease    Type 2 diabetes mellitus with diabetic nephropathy (HCC)    Chronic renal insufficiency, stage 3 (moderate) (HCC)    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 *      1.  Type I cryoglobulinemia:     -This was diagnosed in Mar / Apr 2020 in [facility] after getting evaluated for worsening renal failure. -Immunofixation showed cryoglobulins  -Bone marrow aspiration and biopsy showed 5 to 10% plasma cells  -Cryoglobulins were 1% and cryo crit was 1.5% in April 2020  -The patient was started on plasmapheresis treatment in June 2020  -He was also started on Velcade and dexamethasone in weekly fashion.  Most recent treatment was done on 7/14/2020  - Quantitative immunoglobulins obtained on 7/2/2020 showed IgG of 855 and IgM of 93-normal kappa lambda ratio was normal 1.36  - According to Dr. Sadia Arana, patient was not responding to plasmapheresis.    - Cryoglobulin, cryocrit, and ANCA pending.  - Plan to give velcade + dexamethasone 7/21/2020.          2.  History of squamous cell carcinoma of the lung:     -Status post definitive radiation - SBRT -in January 2020  -CT chest done at [facility] in late April 2020 showed positive response. -CT chest on 7/17/2020 shoed mod-large pleural effusion, will have thoracentesis. Fluid to be sent for cytology.        3. Anemia:     -Multifactorial  -There was no evidence of iron or B12 deficiency on the blood work done at [facility]  -Transfuse PRBCs if hemoglobin is less than 7.       4.  Respiratory failure:     -On broad-spectrum antibiotics  -Management per primary team.  -Pulmonology following.      Patient's other issues include     5.  Chronic kidney disease     5.  History of ANCA positive vasculitis     6. Coronary artery disease     7.  Diabetes           Inder Wilkinson [place]  Oncology Hematology [address] [facility].  [phone]    Patient seen and examined. Agree with above. Will give Velcade and dexamethasone on 7/21/2020. We will continue to hold plasmapheresis.     Ignacio Fink MD

## 2020-07-20 NOTE — PROGRESS NOTES
Speech Language Pathology  Dysphagia Treatment Note    Name:  Jaime Singh  :   1940  Medical Diagnosis:  Acute respiratory failure (HCC) [J96.00]  Acute respiratory failure ([address] [J96.00]  Treatment Diagnosis: Oropharyngeal Dysphagia  Pain level: Pt denies pain at this time    Current Diet Level: Dysphagia III Soft and Bite-Sized with thin liquids, no straws, meds in puree     Tolerance of Current Diet Level: Nurse reports good tolerance with no overt signs of aspiration reported    Assessment of Texture Tolerance:  -Impressions: Pt alert and responsive on 2L O2 via nasal canula. Pt reports mild increased WOB at this time. Po trials given to assess for tolerance. Mild reduced bolus control and A-P propulsion noted with all textures. Mild delayed swallow initiation and mild reduced laryngeal excursion continue to be noted. Improved tolerance of thin liquids given in combination with solids, when Pt prompted to chew/swallow solid prior to introducing a thin liquid rinse. No significant increased SOB noted with po trials. Diet and Treatment Recommendations:  Dysphagia III Soft and Bite-Sized with thin liquids, no straws, meds in puree     (Dysphagia Goals addressed, if appropriate)  1.) Pt will tolerate recommended diet without s/s of aspiration (Ongoing 20)  2.) If clinical symptoms of penetration/aspiration continue to be noted,Pt will tolerate MBS to r/o aspiration and determine appropriate diet/liquid level. (Ongoing 20)   3.) Pt will tolerate diet advance to least restricted diet, as clinically indicated, with no signs of aspiration(Ongoing 20)     Plan:  Continued daily Dysphagia treatment with goals per plan of care. Patient/Family Education:Education given to the Pt and nurse, who verbalized understanding    Discharge Recommendations:  Pt will benefit from continued skilled Speech Therapy for Dysphagia services, prior to returning home.     Timed Code Treatment: 0 min    Total Treatment Time: 10 min    If patient discharges prior to next session this note will serve as a discharge summary.      Salvatore ROCHAC[facility]#[id number]

## 2020-07-20 NOTE — CARE COORDINATION
Discharge Planning Assessment    SW discharge planner met with patient/ (and family member) to discuss reason for admission, current living situation, and potential needs at the time of discharge    Demographics/Insurance verified Yes/yes    Current type of dwelling: Condo    Patient from ECF/SW confirmed with: n/a    Living arrangements: Lives with spouse on 1 floor    Level of function/Support: dependant     Manuela Arboleda MD    Last Visit to PCP: 7/2020    DME: On home O2, [employer]. Active with any community resources/agencies/skilled home care: yes- but spouse is unsure of agency name    Medication compliance issues: no    Financial issues that could impact healthcare: no      Tentative discharge plan: Pt has been to SNF in the past, but spouse cannot remember the name   Discussed and provided facilities of choice if transition to a skilled nursing facility is required at the time of discharge      Discussed with patient and/or family that on the day of discharge home tentative time of discharge will be between 10 AM and noon.     Transportation at the time of discharge: yes- pt's [address] [place], [address] [place]

## 2020-07-20 NOTE — PROGRESS NOTES
Morning reassessment completed, patient denies needs at this time, call light in reach, will continue to monitor.

## 2020-07-20 NOTE — BRIEF OP NOTE
Brief Postoperative Note    Cindy Rubio  YOB: 1940  [id number]    Pre-operative Diagnosis: right pleural effusion    Post-operative Diagnosis: Same    Procedure: right thoracentesis    Anesthesia: Local    Surgeons: Lauren Estrada MD    Estimated Blood Loss: Less than 5 mL    Complications: None    Specimens: Was Obtained: right pleural fluid drained    Findings: Successful US-guided right thoracentesis.     Electronically signed by Lauren Estrada MD on 7/20/2020 at 4:07 PM

## 2020-07-20 NOTE — PROGRESS NOTES
Physical Therapy    Facility/Department: [address] ONCOLOGY  Initial Assessment    NAME: Karlo Snow  : 1940  MRN: [id number]    Date of Service: 2020    Discharge Recommendations:Hoang Sarmiento scored a 15/24 on the AM-PAC short mobility form. Current research shows that an AM-PAC score of 17 or less is typically not associated with a discharge to the patient's home setting. Based on the patient's AM-PAC score and their current functional mobility deficits, it is recommended that the patient have 3-5 sessions per week of Physical Therapy at d/c to increase the patient's independence. Please see assessment section for further patient specific details. If patient discharges prior to next session this note will serve as a discharge summary. Please see below for the latest assessment towards goals. PT Equipment Recommendations  Equipment Needed: No    Assessment   Body structures, Functions, Activity limitations: Decreased functional mobility ; Decreased ADL status; Decreased ROM; Decreased strength;Decreased cognition;Decreased endurance;Decreased balance  Assessment: Pt presents as below his baseline function. Pt would benefit from skilled PT services to promote safe return to PLOF. Prognosis: Good  Decision Making: Medium Complexity  PT Education: Goals;PT Role;Plan of Care  Patient Education: D/C recommendations--pt would benefit from reinforcement  REQUIRES PT FOLLOW UP: Yes  Activity Tolerance  Activity Tolerance: Patient Tolerated treatment well;Patient limited by fatigue;Patient limited by endurance; Patient limited by cognitive status       Patient Diagnosis(es): The primary encounter diagnosis was Pneumonia due to organism. Diagnoses of Suspected COVID-19 virus infection, Acute on chronic congestive heart failure, unspecified heart failure type ([address] Acute respiratory failure with hypoxia ([address] and Accelerated hypertension were also pertinent to this visit.      has a past medical history of CAD (coronary artery disease), Hyperlipidemia, Hypertension, and Type II or unspecified type diabetes mellitus without mention of complication, not stated as uncontrolled. has a past surgical history that includes Coronary angioplasty with stent (2004). Restrictions  Restrictions/Precautions  Restrictions/Precautions: Fall Risk(high)  Required Braces or Orthoses?: No  Position Activity Restriction  Other position/activity restrictions: Francisca Weeks is a 78 y.o. male  who presents to the ED complaining of concern for acute decompensation of respiratory failure. The patient became unresponsive earlier this morning and having worsening respiratory distress. He had hypoxia in the 50% range in 60% range per EMS and home health visits. Increasing his supplemental home oxygen did not really improve much. Apparently he is physically fairly debilitated but mentally pretty sharp according to 2 sons whom I spoke with on the phone. No reports of any fevers. History is severely limited as the patient is unable to provide any himself and was intubated shortly after arrival.  As such I am able to determine from chart review that he does have a history of lung cancer and cryoglobulinemia and gets plasmapheresis treatments and has severe chronic kidney disease but has not been on dialysis yet. Vision/Hearing  Vision: Within Functional Limits  Hearing: Within functional limits     Subjective  General  Chart Reviewed: Yes  Response To Previous Treatment: Not applicable  Family / Caregiver Present: No  Diagnosis: Acute Respiratory Failure  Follows Commands: Within Functional Limits  General Comment  Comments: Pt supine in bed upon arrival.  Subjective  Subjective: Pt agreeable to minimal therapy, remaking frequently on being cold and wanting to return to bed.   Pain Screening  Patient Currently in Pain: Denies  Vital Signs  Patient Currently in Pain: Denies       Orientation  Orientation  Overall Orientation Status: Impaired  Orientation Level: Oriented to person;Oriented to place; Disoriented to time;Disoriented to situation  Social/Functional History  Social/Functional History  Lives With: Spouse  Type of Home: Apartment  Home Layout: One level  Home Access: Stairs to enter without rails  Entrance Stairs - Number of Steps: 1 LESLEY  Bathroom Shower/Tub: Walk-in shower, Shower chair with back  Bathroom Equipment: Shower chair, Grab bars in [address] [place]: Cane, Standard walker, Rolling walker  ADL Assistance: Independent  Homemaking Responsibilities: No(wife)  Ambulation Assistance: Independent(with cane)  Transfer Assistance: Independent  Additional Comments: Pt is a questionable historian  Cognition        Objective     Observation/Palpation  Posture: Fair    AROM RLE (degrees)  RLE AROM: WFL  AROM LLE (degrees)  LLE AROM : WFL  Strength RLE  Strength RLE: WFL  Strength LLE  Strength LLE: WFL  Tone RLE  RLE Tone: Normotonic  Tone LLE  LLE Tone: Normotonic  Motor Control  Gross Motor?: WFL  Sensation  Overall Sensation Status: WFL  Bed mobility  Supine to Sit: Minimal assistance  Sit to Supine: Contact guard assistance  Scooting: Contact guard assistance  Transfers  Sit to Stand: Minimal Assistance  Stand to sit: Contact guard assistance  Ambulation  Ambulation?: No(Pt encouraged to ambulate a short distance in room, pt only taking 2 steps in place and refusing further ambulation)  Stairs/Curb  Stairs?: No     Balance  Posture: Fair  Sitting - Static: Good;-  Sitting - Dynamic: Fair;+  Standing - Static: Fair  Standing - Dynamic: Fair        Plan   Plan  Times per week: 3-5x  Times per day: Daily  Current Treatment Recommendations: Strengthening, ROM, Balance Training, Functional Mobility Training, Transfer Training, Endurance Training, Gait Training, Home Exercise Program, Safety Education & Training, Patient/Caregiver Education & Training  Safety Devices  Type of devices:  All fall risk precautions in

## 2020-07-20 NOTE — PROGRESS NOTES
Pt arrived to unit in stable conditions. VSS. Dressing to right side of back is clean, dry, and intact. Pt denies any pain or needs. Call light within reach. Bed alarm engaged.

## 2020-07-20 NOTE — PROGRESS NOTES
[facility] Pulmonary and Critical Care    Follow Up Note    Subjective:   CHIEF COMPLAINT / HPI:   Chief Complaint   Patient presents with    Respiratory Distress     Pt in by EMS from home. At home therapy was with pt when he began to deteriorate rapidly and became unresponsive. Pt is lethargic, EMS reporting 61% on RA. 92% on NRB upon arrival.       Interval history: Patient presented to the emergency room initially with lethargy and profound hypoxemia. He required intubation and mechanical ventilation. He has since been weaned from the ventilator. This morning he was alert, oriented and participated in the history. The patient does have a history of squamous cell lung cancer. He completed SBRT about 3 weeks ago. He also relates to me that he had thoracentesis perhaps about 3 weeks ago. He thinks the volume was close to 1 L. At the moment he is not complaining of shortness of breath at rest.  The patient was negative for COVID-19. He also has chronic renal failure with a creatinine normally around 3. Nephrology is following him. Additionally, the patient has a history of cryoglobulinemia. Hematology is following him for this as well. Normally his [facility] treatment [facility] at [facility]. He does give a history of COPD. He takes breathing treatments at home. He also is oxygen dependent 24 hours/day. He wears between 2.5 and 3 L/min.     Past Medical History:    Reviewed; no changes    Social History:    Reviewed; no changes    REVIEW OF SYSTEMS:    CONSTITUTIONAL:  negative for fevers and chills  RESPIRATORY:  See HPI  CARDIOVASCULAR:  negative for chest pain, palpitations, edema  GASTROINTESTINAL:  negative for nausea, vomiting, diarrhea, constipation and abdominal pain    Objective:   PHYSICAL EXAM:        VITALS:  BP (!) 175/78   Pulse 93   Temp 97.9 °F (36.6 °C) (Temporal)   Resp 18   Ht 5' 7.8\" (1.722 m)   Wt 162 lb 9.6 oz (73.8 kg)   SpO2 99%   BMI 24.87 kg/m²  on 2L NC    24HR INTAKE/OUTPUT: Intake/Output Summary (Last 24 hours) at 7/20/2020 1224  Last data filed at 7/20/2020 0000  Gross per 24 hour   Intake --   Output 900 ml   Net -900 ml       CONSTITUTIONAL:  awake, alert,  no apparent distress, and appears stated age  LUNGS:  No increased work of breathing and clear to auscultation, no crackles or wheezes  CARDIOVASCULAR: S1 and S2 and no JVD  ABDOMEN:  normal bowel sounds, non-distended and non-tender to palpation  EXT: No edema, no calf tenderness. Pulses are present bilaterally. NEUROLOGIC:  Mental Status Exam:  Level of Alertness:   awake  Orientation:   person, place, time. Non focal  SKIN:  normal skin color, texture, turgor, no redness, warmth, or swelling at IV sites    DATA:    CBC:  Recent Labs     07/18/20 0445 07/19/20  0600 07/20/20  0800   WBC 10.2 8.6 8.4   RBC 2.81* 3.01* 3.23*   HGB 7.8* 8.5* 9.0*   HCT 24.2* 26.2* 28.0*    195 199   MCV 86.0 87.2 86.7   MCH 27.8 28.1 27.8   MCHC 32.3 32.3 32.1   RDW 17.4* 17.1* 17.2*      BMP:  Recent Labs     07/18/20 0445 07/19/20  0600 07/20/20  0800    145 143   K 4.2 3.9 4.0    105 105   CO2 29 30 30   BUN 49* 42* 39*   CREATININE 3.7* 3.7* 3.4*   CALCIUM 9.4 9.5 9.4   GLUCOSE 114* 104* 138*      ABG:  No results for input(s): PHART, IMF2TIG, PO2ART, LWC8TUX, N5FZLQFG, BEART in the last 72 hours. Cultures:    Abx:    Radiology Review:  Pertinent images / reports were reviewed as a part of this visit. Assessment:     1. Acute on chronic hypoxemic respiratory failure  · Patient has tolerated liberation from mechanical ventilation  · Now on 2 L/min oxygen supplement with good saturations  · This is similar to his home dose of oxygen.     2.  Pleural effusion  · Moderate right pleural effusion  · Likely has recurred in the interval of the last 3 weeks since he described thoracentesis at an outside facility  · Patient believes initial thoracentesis was negative for malignancy  · I think he would benefit from thoracentesis  · We will resubmit cytology    3. Squamous cell lung cancer  · Status post SBRT  · CT imaging does reveal right infrahilar mass which is presumably the target of his treatment. 4.  Acute on chronic kidney disease  · Nephrology following  · At some point may need hemodialysis    5.   Cryoglobulinemia  · Hematology/oncology is following

## 2020-07-20 NOTE — PROGRESS NOTES
[facility] PROGRESS NOTE    7/20/2020 2:36 PM        Name: Jaime Singh . Admitted: 7/16/2020  Primary Care Provider: Gordon Lambert MD (Tel: [phone])                        Subjective:  . No acute events overnight. Resting well. Pain control. Diet ok. Labs reviewed  Denies any chest pain sob.      Reviewed interval ancillary notes    Current Medications  hydrALAZINE (APRESOLINE) tablet 75 mg, TID  [START ON 7/21/2020] bortezomib (VELCADE) 2.45 mg in sodium chloride (PF) 0.98 mL chemo subcutaneous syringe, Once  acyclovir (ZOVIRAX) capsule 400 mg, BID  [START ON 7/21/2020] dexamethasone (DECADRON) tablet 20 mg, Once  insulin lispro (1 Unit Dial) 0-6 Units, TID WC  insulin lispro (1 Unit Dial) 0-3 Units, Nightly  sodium chloride flush 0.9 % injection 10 mL, 2 times per day  sodium chloride flush 0.9 % injection 10 mL, PRN  acetaminophen (TYLENOL) tablet 650 mg, Q6H PRN    Or  acetaminophen (TYLENOL) suppository 650 mg, Q6H PRN  polyethylene glycol (GLYCOLAX) packet 17 g, Daily PRN  promethazine (PHENERGAN) tablet 12.5 mg, Q6H PRN    Or  ondansetron (ZOFRAN) injection 4 mg, Q6H PRN  acetaminophen (TYLENOL) tablet 650 mg, Q6H PRN    Or  acetaminophen (TYLENOL) suppository 650 mg, Q6H PRN  heparin (porcine) injection 5,000 Units, 3 times per day  pantoprazole (PROTONIX) injection 40 mg, Daily  hydrALAZINE (APRESOLINE) injection 10 mg, Q6H PRN  metoprolol tartrate (LOPRESSOR) tablet 50 mg, BID  NIFEdipine (PROCARDIA XL) extended release tablet 60 mg, Daily  atorvastatin (LIPITOR) tablet 20 mg, Daily  aspirin EC tablet 81 mg, Daily  tamsulosin (FLOMAX) capsule 0.4 mg, Daily  vitamin C (ASCORBIC ACID) tablet 500 mg, Daily  glucose (GLUTOSE) 40 % oral gel 15 g, PRN  dextrose 50 % IV solution, PRN  glucagon (rDNA) injection 1 mg, PRN  dextrose 5 % solution, [facility])    Essential hypertension    Raynaud's disease    Type 2 diabetes mellitus with diabetic nephropathy (HCC)    Chronic renal insufficiency, stage 3 (moderate) (HCC)    Malignant neoplasm of upper lobe of lung ([facility]    Acute respiratory failure (HCC)  Resolved Problems:    * No resolved hospital problems. *       Assessment & Plan:   1. Acute hypoxic respiratory failure  -Which is likely multifactorial probably acute compensation with underlying chronic lung disease  -Needed intubation on arrival patient has been extubated still pulse oxing 89%   -Continue to titrate to keep oxygen below 90. Pleural effusion  -Appreciate pulmonary recommendation  -Possible thoracocentesis. To reassess fluid and help in breathing. Lung mass with hilar lymphadenopathy  -Concerning for recurrence of cancer.  -We will need outpatient oncology follow-up that he follows at   -Antibiotics has been discontinued    Bilateral pleural effusion    Acute on chronic kidney disease  -Nephrology following with history of MGUS        Diet: DIET DYSPHAGIA SOFT AND BITE-SIZED; No Drinking Straw  Code:Full Code  DVT PPX lovenox   disposition-PT OT. Pending further treatment.       Sarita Guerrero MD   7/20/2020 2:36 PM

## 2020-07-20 NOTE — PROGRESS NOTES
Called and gave report to RN on 5T. Verbalized understanding. Called son, Katherine Ernandez and informed him that pt was being moved and gave him the phone number to the unit. Verbalized understanding.

## 2020-07-20 NOTE — PROGRESS NOTES
MD Felicita Weiss MD Tawnya Hams, MD                  Office: [phone]                 Fax: [phone]         [address] [place] [address] Note    PATIENT NAME: Deepika Macario  : 1940  MRN: [id number]      Assessment/Plan:  1. Acute kidney injury on CKD IV, baseline creatinine 3.4. Follows  with  Nephrology. Renal function is relatively stable to BL and he is  nonoliguric.  CTM.   -Discussed with patient about no significant renal improvement w/ recent PLEX + Velcade (as per Hem/onc), possibility of dialysis in near future, he is willing to proceed if needed.   -Will f/u plans for thoracocentesis , for ?maligant effusion - as that would change his prognosis. - discussed w/ Dr Marylene Slim. 2.  Hyperkalemia, improved. 3.  Hypertension. Variable. - further  Increase Hydralazine. From 50TID to 75TID  - w Holding parameters. 4.  MGUS with cryoglobulinemia. Hematology has been consulted. previously discussed with Hematology if needed to continue Plasmapheresis, hold for now and await cryoglobulin assay. 5.  Anemia. Hematology is following. ?MARTI. Hgb better. 6.  Acute respiratory failure, better  7. Diabetes mellitus. Management as per Medicine. High complexity. Multiple medical problems. Discussed with patient and treatment team. , [facility]   Thank you for allowing me to participate in this patient's care. Please do not hesitate to contact me for any questions/concerns. We will follow along with you.      Diane Nicholson MD  [facility] [address]   Phone: [phone] or [employer]  Fax: [phone]        Patient Active Problem List   Diagnosis    Diabetes mellitus type 2 with complications [facility])    Essential hypertension    Hypercholesteremia    Coronary atherosclerosis    Well adult exam    Raynaud's disease    Urinary frequency    Microalbuminuria    Abdominal aortic aneurysm without rupture ([facility]    Bilateral carotid artery stenosis    Type 2 diabetes mellitus with diabetic nephropathy (HCC)    Vasculogenic erectile dysfunction    Chronic renal insufficiency, stage 3 (moderate) (HCC)    Adjustment disorder with mixed anxiety and depressed mood    BPH (benign prostatic hyperplasia)    Dyslipidemia    Hypokalemia    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 Past Social History:   reports that he quit smoking about 14 years ago. He has never used smokeless tobacco. He reports current alcohol use of about 2.0 standard drinks of alcohol per week. He reports that he does not use drugs.     Subjective:  Seen resting in room, no active complaints, + cough      Objective:      BP (!) 158/65   Pulse 87   Temp 98.1 °F (36.7 °C) (Temporal)   Resp 18   Ht 5' 7.8\" (1.722 m)   Wt 162 lb 9.6 oz (73.8 kg)   SpO2 96%   BMI 24.87 kg/m²     Wt Readings from Last 3 Encounters:   07/20/20 162 lb 9.6 oz (73.8 kg)   01/27/20 164 lb 6.4 oz (74.6 kg)   01/06/20 167 lb 3.2 oz (75.8 kg)       BP Readings from Last 3 Encounters:   07/20/20 (!) 158/65   01/27/20 130/70   01/06/20 110/60     AAOx3   Chest- clear, has tunneled catheter  Heart-regular  Abd-soft  Ext- no edema  -+Han    Labs  Hemoglobin   Date Value Ref Range Status   07/20/2020 9.0 (L) 13.5 - 17.5 g/dL Final     Hematocrit   Date Value Ref Range Status   07/20/2020 28.0 (L) 40.5 - 52.5 % Final     WBC   Date Value Ref Range Status   07/20/2020 8.4 4.0 - 11.0 K/uL Final     Platelets   Date Value Ref Range Status   07/20/2020 199 135 - 450 K/uL Final     Lab Results   Component Value Date    CREATININE 3.7 (H) 07/19/2020    BUN 42 (H) 07/19/2020     07/19/2020    K 3.9 07/19/2020     07/19/2020    CO2 30 07/19/2020

## 2020-07-21 LAB
A/G RATIO: 1.3 (ref 1.1–2.2)
ALBUMIN SERPL-MCNC: 3 G/DL (ref 3.4–5)
ALP BLD-CCNC: 63 U/L (ref 40–129)
ALT SERPL-CCNC: 18 U/L (ref 10–40)
ANION GAP SERPL CALCULATED.3IONS-SCNC: 8 MMOL/L (ref 3–16)
APTT: 31.4 SEC (ref 24.2–36.2)
AST SERPL-CCNC: 12 U/L (ref 15–37)
BASOPHILS ABSOLUTE: 0 K/UL (ref 0–0.2)
BASOPHILS RELATIVE PERCENT: 0.4 %
BILIRUB SERPL-MCNC: 0.3 MG/DL (ref 0–1)
BUN BLDV-MCNC: 38 MG/DL (ref 7–20)
CALCIUM SERPL-MCNC: 9.6 MG/DL (ref 8.3–10.6)
CHLORIDE BLD-SCNC: 106 MMOL/L (ref 99–110)
CO2: 30 MMOL/L (ref 21–32)
CREAT SERPL-MCNC: 3.5 MG/DL (ref 0.8–1.3)
D DIMER: 739 NG/ML DDU (ref 0–229)
EOSINOPHILS ABSOLUTE: 0.3 K/UL (ref 0–0.6)
EOSINOPHILS RELATIVE PERCENT: 3.8 %
FIBRINOGEN: 389 MG/DL (ref 200–397)
GFR AFRICAN AMERICAN: 20
GFR NON-AFRICAN AMERICAN: 17
GLOBULIN: 2.4 G/DL
GLUCOSE BLD-MCNC: 134 MG/DL (ref 70–99)
GLUCOSE BLD-MCNC: 139 MG/DL (ref 70–99)
GLUCOSE BLD-MCNC: 141 MG/DL (ref 70–99)
GLUCOSE BLD-MCNC: 170 MG/DL (ref 70–99)
GLUCOSE BLD-MCNC: 188 MG/DL (ref 70–99)
GLUCOSE BLD-MCNC: 216 MG/DL (ref 70–99)
GLUCOSE BLD-MCNC: 305 MG/DL (ref 70–99)
HCT VFR BLD CALC: 27.4 % (ref 40.5–52.5)
HEMOGLOBIN: 8.6 G/DL (ref 13.5–17.5)
INR BLD: 0.97 (ref 0.86–1.14)
LYMPHOCYTES ABSOLUTE: 1.1 K/UL (ref 1–5.1)
LYMPHOCYTES RELATIVE PERCENT: 14.4 %
MCH RBC QN AUTO: 27.4 PG (ref 26–34)
MCHC RBC AUTO-ENTMCNC: 31.5 G/DL (ref 31–36)
MCV RBC AUTO: 87.2 FL (ref 80–100)
MONOCYTES ABSOLUTE: 0.9 K/UL (ref 0–1.3)
MONOCYTES RELATIVE PERCENT: 11.3 %
NEUTROPHILS ABSOLUTE: 5.4 K/UL (ref 1.7–7.7)
NEUTROPHILS RELATIVE PERCENT: 70.1 %
PDW BLD-RTO: 16.9 % (ref 12.4–15.4)
PERFORMED ON: ABNORMAL
PLATELET # BLD: 204 K/UL (ref 135–450)
PMV BLD AUTO: 9.2 FL (ref 5–10.5)
POTASSIUM REFLEX MAGNESIUM: 4.2 MMOL/L (ref 3.5–5.1)
PROTHROMBIN TIME: 11.3 SEC (ref 10–13.2)
RBC # BLD: 3.14 M/UL (ref 4.2–5.9)
SODIUM BLD-SCNC: 144 MMOL/L (ref 136–145)
TOTAL PROTEIN: 5.4 G/DL (ref 6.4–8.2)
VISCOSITY, SERUM: 1.12 CP (ref 1.1–1.8)
WBC # BLD: 7.7 K/UL (ref 4–11)

## 2020-07-21 PROCEDURE — 6370000000 HC RX 637 (ALT 250 FOR IP): Performed by: HOSPITALIST

## 2020-07-21 PROCEDURE — 85025 COMPLETE CBC W/AUTO DIFF WBC: CPT

## 2020-07-21 PROCEDURE — C9113 INJ PANTOPRAZOLE SODIUM, VIA: HCPCS | Performed by: INTERNAL MEDICINE

## 2020-07-21 PROCEDURE — 6360000002 HC RX W HCPCS: Performed by: INTERNAL MEDICINE

## 2020-07-21 PROCEDURE — 6360000002 HC RX W HCPCS: Performed by: HOSPITALIST

## 2020-07-21 PROCEDURE — 2580000003 HC RX 258: Performed by: HOSPITALIST

## 2020-07-21 PROCEDURE — 85610 PROTHROMBIN TIME: CPT

## 2020-07-21 PROCEDURE — 6370000000 HC RX 637 (ALT 250 FOR IP): Performed by: INTERNAL MEDICINE

## 2020-07-21 PROCEDURE — 85384 FIBRINOGEN ACTIVITY: CPT

## 2020-07-21 PROCEDURE — 85730 THROMBOPLASTIN TIME PARTIAL: CPT

## 2020-07-21 PROCEDURE — 2580000003 HC RX 258: Performed by: INTERNAL MEDICINE

## 2020-07-21 PROCEDURE — 80053 COMPREHEN METABOLIC PANEL: CPT

## 2020-07-21 PROCEDURE — 85379 FIBRIN DEGRADATION QUANT: CPT

## 2020-07-21 PROCEDURE — 1200000000 HC SEMI PRIVATE

## 2020-07-21 PROCEDURE — 99232 SBSQ HOSP IP/OBS MODERATE 35: CPT | Performed by: INTERNAL MEDICINE

## 2020-07-21 PROCEDURE — 36592 COLLECT BLOOD FROM PICC: CPT

## 2020-07-21 RX ADMIN — Medication 500 MG: at 08:10

## 2020-07-21 RX ADMIN — ASPIRIN 81 MG: 81 TABLET, COATED ORAL at 08:10

## 2020-07-21 RX ADMIN — METOPROLOL TARTRATE 50 MG: 50 TABLET, FILM COATED ORAL at 08:10

## 2020-07-21 RX ADMIN — HYDRALAZINE HYDROCHLORIDE 75 MG: 25 TABLET, FILM COATED ORAL at 08:10

## 2020-07-21 RX ADMIN — ACYCLOVIR 400 MG: 200 CAPSULE ORAL at 08:09

## 2020-07-21 RX ADMIN — HEPARIN SODIUM 5000 UNITS: 5000 INJECTION INTRAVENOUS; SUBCUTANEOUS at 05:41

## 2020-07-21 RX ADMIN — PANTOPRAZOLE SODIUM 40 MG: 40 INJECTION, POWDER, FOR SOLUTION INTRAVENOUS at 08:09

## 2020-07-21 RX ADMIN — HEPARIN SODIUM 5000 UNITS: 5000 INJECTION INTRAVENOUS; SUBCUTANEOUS at 21:30

## 2020-07-21 RX ADMIN — HYDRALAZINE HYDROCHLORIDE 75 MG: 25 TABLET, FILM COATED ORAL at 21:32

## 2020-07-21 RX ADMIN — Medication 10 ML: at 21:30

## 2020-07-21 RX ADMIN — TAMSULOSIN HYDROCHLORIDE 0.4 MG: 0.4 CAPSULE ORAL at 08:10

## 2020-07-21 RX ADMIN — HEPARIN SODIUM 5000 UNITS: 5000 INJECTION INTRAVENOUS; SUBCUTANEOUS at 16:00

## 2020-07-21 RX ADMIN — ATORVASTATIN CALCIUM 20 MG: 20 TABLET, FILM COATED ORAL at 08:10

## 2020-07-21 RX ADMIN — INSULIN LISPRO 1 UNITS: 100 INJECTION, SOLUTION INTRAVENOUS; SUBCUTANEOUS at 13:09

## 2020-07-21 RX ADMIN — NIFEDIPINE 60 MG: 30 TABLET, EXTENDED RELEASE ORAL at 08:10

## 2020-07-21 RX ADMIN — Medication 10 ML: at 08:10

## 2020-07-21 RX ADMIN — HYDRALAZINE HYDROCHLORIDE 75 MG: 25 TABLET, FILM COATED ORAL at 16:01

## 2020-07-21 RX ADMIN — INSULIN LISPRO 2 UNITS: 100 INJECTION, SOLUTION INTRAVENOUS; SUBCUTANEOUS at 21:30

## 2020-07-21 RX ADMIN — DEXAMETHASONE 20 MG: 4 TABLET ORAL at 16:00

## 2020-07-21 RX ADMIN — METOPROLOL TARTRATE 50 MG: 50 TABLET, FILM COATED ORAL at 21:32

## 2020-07-21 RX ADMIN — ACYCLOVIR 400 MG: 200 CAPSULE ORAL at 21:32

## 2020-07-21 RX ADMIN — BORTEZOMIB 2.45 MG: 3.5 INJECTION, POWDER, LYOPHILIZED, FOR SOLUTION INTRAVENOUS; SUBCUTANEOUS at 15:54

## 2020-07-21 ASSESSMENT — PAIN SCALES - GENERAL
PAINLEVEL_OUTOF10: 0
PAINLEVEL_OUTOF10: 0

## 2020-07-21 NOTE — PROGRESS NOTES
Comprehensive Nutrition Assessment    Type and Reason for Visit:  Reassess    Nutrition Recommendations/Plan:   1. Diet with appropriate thickness/consistency per SLP  2. Trial Ensure Enlive BID  3. Double check weight on standing scale  4. Encourage PO intake  5. Document all PO intake in flow sheets     Nutrition Assessment:  Pt tolerated extubation 7/17 and was started on a dysphagia soft and bite-sized diet with no straw on 7/19 per SLP. Pt's nutrition status is fair; consuming about 50% of meals. Will offer Ensure Enlive BID to promote adequate calorie/protein intake. Question accuracy of pt's  lbs as pt's weight yesterday was 162 lbs. Recommend to double check CBW on standing scale. Malnutrition Assessment:  Malnutrition Status:  Insufficient data(Droplet plus isolation)    Estimated Daily Nutrient Needs:  Energy (kcal):  6477-7518; Weight Used for Energy Requirements:  (Continue to use 73 kg)     Protein (g):   grams; Weight Used for Protein Requirements:  Current(73 kg; 1.2-2 grams per kg)        Fluid (ml/day):  1 mL per kcal     Nutrition Related Findings:  Trace generalized, BUE and BLE edema. -4.8 liters. Wounds:  None       Current Nutrition Therapies:    DIET DYSPHAGIA SOFT AND BITE-SIZED; No Drinking Straw    Anthropometric Measures:  · Height: 5' 7.8\" (172.2 cm)  · Current Body Weight: 162 lb (73.5 kg)(Continue to use weight from 7/20. Question accuracy of weight 7/21)   · Admission Body Weight: 163 lb (73.9 kg)    · Usual Body Weight:       · Ideal Body Weight: 153 lbs     · BMI: 24.8  · BMI Categories: Normal Weight (BMI 18.5-24. 9)       Nutrition Diagnosis:   · Inadequate oral intake related to inadequate protein-energy intake as evidenced by intake 26-50%      Nutrition Interventions:   Food and/or Nutrient Delivery:  Continue Current Diet, Start Oral Nutrition Supplement  Nutrition Education/Counseling:  Education not indicated   Coordination of Nutrition Care: Continued Inpatient Monitoring    Goals:  New goal: Pt will consume at least 50% of meals and supplements       Nutrition Monitoring and Evaluation:   Behavioral-Environmental Outcomes:      Food/Nutrient Intake Outcomes:  Food and Nutrient Intake, Supplement Intake  Physical Signs/Symptoms Outcomes:  Weight     Discharge Planning:    Continue current diet     Electronically signed by Kanchan Granados RD, LD on 7/21/20 at 12:14 PM EDT    Contact: [phone]

## 2020-07-21 NOTE — PROGRESS NOTES
[facility] Pulmonary/CCM Progress note      Admit Date: 7/16/2020    Chief Complaint: Shortness of breath    Subjective: Interval History: Acute hypoxic respiratory failure has resolved-patient was extubated successfully in ICU and transferred to the floor. Awake and alert, overall appears weak. On 3.5 L O2. Status post right-sided thoracentesis performed yesterday, removal of 1.3 L of transudate fluid.     Scheduled Meds:   hydrALAZINE  75 mg Oral TID    bortezomib (VELCADE) 2.5 mg/mL chemo subcutaneous syringe  1.3 mg/m2 Subcutaneous Once    acyclovir  400 mg Oral BID    dexamethasone  20 mg Oral Once    insulin lispro  0-6 Units Subcutaneous TID WC    insulin lispro  0-3 Units Subcutaneous Nightly    sodium chloride flush  10 mL Intravenous 2 times per day    heparin (porcine)  5,000 Units Subcutaneous 3 times per day    pantoprazole  40 mg Intravenous Daily    metoprolol tartrate  50 mg Oral BID    NIFEdipine  60 mg Oral Daily    atorvastatin  20 mg Oral Daily    aspirin EC  81 mg Oral Daily    tamsulosin  0.4 mg Oral Daily    vitamin C  500 mg Oral Daily     Continuous Infusions:   dextrose       PRN Meds:sodium chloride flush, acetaminophen **OR** acetaminophen, polyethylene glycol, promethazine **OR** ondansetron, acetaminophen **OR** acetaminophen, hydrALAZINE, glucose, dextrose, glucagon (rDNA), dextrose    Review of Systems  Constitutional: negative for fatigue, fevers, malaise and weight loss  Ears, nose, mouth, throat: negative for ear drainage, epistaxis, hoarseness, nasal congestion, sore throat and voice change  Respiratory: negative except for shortness of breath  Cardiovascular: negative for chest pain, chest pressure/discomfort, irregular heart beat, lower extremity edema and palpitations  Gastrointestinal: negative for abdominal pain, constipation, diarrhea, jaundice, melena, odynophagia, reflux symptoms and vomiting  Hematologic/lymphatic: negative for bleeding, easy bruising, lymphadenopathy and petechiae  Musculoskeletal:negative for arthralgias, bone pain, muscle weakness, neck pain and stiff joints  Neurological: negative for dizziness, gait problems, headaches, seizures, speech problems, tremors and weakness  Behavioral/Psych: negative for anxiety, behavior problems, depression, fatigue and sleep disturbance  Endocrine: negative for diabetic symptoms including none, neuropathy, polyphagia, polyuria, polydipsia, vomiting and diarrhea and temperature intolerance  Allergic/Immunologic: negative for anaphylaxis, angioedema, hay fever and urticaria    Objective:     Patient Vitals for the past 8 hrs:   BP Temp Temp src Pulse Resp SpO2 Weight   07/21/20 0807 (!) 149/60 97.8 °F (36.6 °C) Oral 82 16 94 % --   07/21/20 0517 -- -- -- -- -- 92 % --   07/21/20 0514 (!) 154/67 98 °F (36.7 °C) Oral 88 16 (!) 89 % 147 lb 3.2 oz (66.8 kg)     I/O last 3 completed shifts:  In: -   Out: 975 [Urine:975]  No intake/output data recorded.     General Appearance: alert and oriented to person, place and time, well developed and well- nourished, in no acute distress  Skin: warm and dry, no rash or erythema  Head: normocephalic and atraumatic  Eyes: pupils equal, round, and reactive to light, extraocular eye movements intact, conjunctivae normal  ENT: external ear and ear canal normal bilaterally, nose without deformity, nasal mucosa and turbinates normal  Neck: supple and non-tender without mass, no cervical lymphadenopathy  Pulmonary/Chest: clear to auscultation bilaterally- no wheezes, rales or rhonchi, normal air movement, no respiratory distress  Cardiovascular: normal rate, regular rhythm,  no murmurs, rubs, distal pulses intact, no carotid bruits  Abdomen: soft, non-tender, non-distended, normal bowel sounds, no masses or organomegaly  Lymph Nodes: Cervical, supraclavicular normal  Extremities: no cyanosis, clubbing or edema  Musculoskeletal: normal range of motion, no joint swelling, deformity or tenderness  Neurologic: alert, no focal neurologic deficits    Data Review:  CBC:   Lab Results   Component Value Date    WBC 7.7 07/21/2020    RBC 3.14 07/21/2020     BMP:   Lab Results   Component Value Date    GLUCOSE 139 07/21/2020    CO2 30 07/21/2020    BUN 38 07/21/2020    CREATININE 3.5 07/21/2020    CALCIUM 9.6 07/21/2020     ABG:   Lab Results   Component Value Date    EPV6QTA 28.3 07/16/2020    BEART 3.8 07/16/2020    H2BFEUVJ 99.8 07/16/2020    PHART 7.437 07/16/2020    NWQ9MET 42.1 07/16/2020    PO2ART 143.0 07/16/2020    TBG2ALA 66.4 07/16/2020       Radiology: All pertinent images / reports were reviewed as a part of this visit. Narrative    PROCEDURE:    ULTRASOUNDGUIDED RIGHT THORACENTESIS         7/20/2020         HISTORY:    ORDERING SYSTEM PROVIDED HISTORY: right effusion    TECHNOLOGIST PROVIDED HISTORY:    Reason for exam:->right effusion         TECHNIQUE:    Informed consent was obtained after a detailed explanation of the procedure    including risks, benefits, and alternatives.  Universal protocol was    performed. The right chest was prepped and draped in sterile fashion and    local anesthesia was achieved with lidocaine.  A 5 Amharic needle sheath was    advanced under ultrasound guidance into pleural effusion and thoracentesis    was performed. The patient tolerated the procedure well.         Estimated blood loss: Less than 5 cc         FINDINGS:    A total of 1.3 L was removed.              Impression    Successful ultrasound guided right thoracentesis. Narrative    EXAMINATION:    CT OF THE CHEST WITHOUT CONTRAST 7/17/2020 2:26 pm         TECHNIQUE:    CT of the chest was performed without the administration of intravenous    contrast. Multiplanar reformatted images are provided for review.  Dose    modulation, iterative reconstruction, and/or weight based adjustment of the    mA/kV was utilized to reduce the radiation dose to as low as reasonably    achievable.      COMPARISON:    None.         HISTORY:    ORDERING SYSTEM PROVIDED HISTORY: assess for lung infiltrates and mass    TECHNOLOGIST PROVIDED HISTORY:    Reason for exam:->assess for lung infiltrates and mass    Reason for Exam: assess for lung infiltrates and mass . Acuity: Acute    Type of Exam: Initial         FINDINGS:    Mediastinum: Mildly enlarged mediastinal lymph nodes are present.  In the    subcarinal space there is a 1.4 x 1.0 cm lymph node. Right paratracheal space    there is a borderline enlarged lymph node measuring 0.8 x 0.8 cm.  Smaller    lymph nodes elsewhere. No pericardial effusion.  Right IJ catheter extends to    the lower SVC.  Endotracheal tube terminates in the lower trachea. Nasogastric tube extends into the stomach.         Lungs/pleura:  There is a right perihilar mass.  This contacts the hilum    measuring approximately 3.9 x 3.6 cm transverse size on axial image 70 of    series 3.  Adjacent atelectasis.  There is narrowing of the proximal left    lower lobe bronchus.  Prominent size of the left hilum suggest adenopathy,    not well evaluated due to lack of contrast, blunting with adjacent    vasculature and the perihilar right lower lobe mass.  In the periphery of the    right lower lobe there is atelectasis adjacent to a moderate to large    right-sided dependent pleural effusion.         There is a small dependent left pleural effusion.  Adjacent dependent disease    is most likely atelectasis.  No pneumothorax on either side.         Upper Abdomen: Small amount of ascites adjacent to the anterior margin of the    liver.  No evidence of acute process elsewhere.         Soft Tissues/Bones: No acute or aggressive osseous findings.  Mild    degenerative changes in the thoracic spine              Impression    Right lower lobe perihilar mass highly suspicious for malignancy.  This may    invade into the adjacent proximal right lower lobe bronchus.  Alternatively    mucous plugging

## 2020-07-21 NOTE — PROGRESS NOTES
Temp 98.2 °F (36.8 °C) (Oral)   Resp 16   Ht 5' 7.8\" (1.722 m)   Wt 147 lb 3.2 oz (66.8 kg)   SpO2 96%   BMI 22.51 kg/m²     Intake/Output Summary (Last 24 hours) at 7/21/2020 1225  Last data filed at 7/21/2020 0807  Gross per 24 hour   Intake 240 ml   Output 975 ml   Net -735 ml      Wt Readings from Last 3 Encounters:   07/21/20 147 lb 3.2 oz (66.8 kg)   01/27/20 164 lb 6.4 oz (74.6 kg)   01/06/20 167 lb 3.2 oz (75.8 kg)       General appearance:  Appears comfortable  Eyes: Sclera clear. Pupils equal.  ENT: Moist oral mucosa. Trachea midline, no adenopathy. Cardiovascular: Regular rhythm, normal S1, S2. No murmur. No edema in lower extremities  Respiratory: Not using accessory muscles. Good inspiratory effort. Clear to auscultation bilaterally, no wheeze or crackles. GI: Abdomen soft, no tenderness, not distended, normal bowel sounds  Musculoskeletal: No cyanosis in digits, neck supple  Neurology: CN 2-12 grossly intact. No speech or motor deficits  Psych: Normal affect.  Alert and oriented in time, place and person  Skin: Warm, dry, normal turgor    Labs and Tests:  CBC:   Recent Labs     07/19/20  0600 07/20/20  0800 07/21/20  0550   WBC 8.6 8.4 7.7   HGB 8.5* 9.0* 8.6*    199 204     BMP:    Recent Labs     07/19/20 0600 07/20/20  0800 07/21/20  0550    143 144   K 3.9 4.0 4.2    105 106   CO2 30 30 30   BUN 42* 39* 38*   CREATININE 3.7* 3.4* 3.5*   GLUCOSE 104* 138* 139*     Hepatic:   Recent Labs     07/19/20  0600 07/20/20  0800 07/21/20  0550   AST 13* 12* 12*   ALT 29 21 18   BILITOT 0.3 0.3 0.3   ALKPHOS 65 65 63       Discussed care with family and patient             Spent 30  minutes with patient and family at bedside and on unit reviewing medical records and labs, spent greater than 50% time counseling patient and family on diagnosis and plan   Problem List  Active Problems:    Diabetes mellitus type 2 with complications ([facility] [address]    Essential hypertension    Raynaud's disease Type 2 diabetes mellitus with diabetic nephropathy (HCC)    Chronic renal insufficiency, stage 3 (moderate) (HCC)    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 *       Assessment & Plan:   1. Acute hypoxic respiratory failure  -Which is likely multifactorial probably acute compensation with underlying chronic lung disease  -Needed intubation on arrival patient has been extubated still pulse oxing 89%   -Continue to titrate to keep oxygen below 90.  -Patient is status post thoracocentesis with fluid removal awaiting cytology. Pleural effusion  -Appreciate pulmonary recommendation  -Status post thoracocentesis. Awaiting cytology. Lung mass with hilar lymphadenopathy  -Concerning for recurrence of cancer.  -We will need outpatient oncology follow-up that he follows at   -Antibiotics has been discontinued    Bilateral pleural effusion    Acute on chronic kidney disease  -Nephrology following with history of MGUS        Diet: DIET DYSPHAGIA SOFT AND BITE-SIZED; No Drinking Straw  Code:Full Code  DVT PPX lovenox   disposition-PT OT.   P recommended skilled nursing will ask social work to assist.      Katharine Mclean MD   7/21/2020 12:25 PM

## 2020-07-21 NOTE — PROGRESS NOTES
[facility]   Progress Note      7/21/2020 8:52 AM        Name: Karlo Snow . Admitted: 7/16/2020    SUBJECTIVE:  He is feeling ok, resting comfortably, he offers no complaints. No SOB.       Reviewed interval ancillary notes    Current Medications  hydrALAZINE (APRESOLINE) tablet 75 mg, TID  bortezomib (VELCADE) 2.45 mg in sodium chloride (PF) 0.98 mL chemo subcutaneous syringe, Once  acyclovir (ZOVIRAX) capsule 400 mg, BID  dexamethasone (DECADRON) tablet 20 mg, Once  insulin lispro (1 Unit Dial) 0-6 Units, TID WC  insulin lispro (1 Unit Dial) 0-3 Units, Nightly  sodium chloride flush 0.9 % injection 10 mL, 2 times per day  sodium chloride flush 0.9 % injection 10 mL, PRN  acetaminophen (TYLENOL) tablet 650 mg, Q6H PRN    Or  acetaminophen (TYLENOL) suppository 650 mg, Q6H PRN  polyethylene glycol (GLYCOLAX) packet 17 g, Daily PRN  promethazine (PHENERGAN) tablet 12.5 mg, Q6H PRN    Or  ondansetron (ZOFRAN) injection 4 mg, Q6H PRN  acetaminophen (TYLENOL) tablet 650 mg, Q6H PRN    Or  acetaminophen (TYLENOL) suppository 650 mg, Q6H PRN  heparin (porcine) injection 5,000 Units, 3 times per day  pantoprazole (PROTONIX) injection 40 mg, Daily  hydrALAZINE (APRESOLINE) injection 10 mg, Q6H PRN  metoprolol tartrate (LOPRESSOR) tablet 50 mg, BID  NIFEdipine (PROCARDIA XL) extended release tablet 60 mg, Daily  atorvastatin (LIPITOR) tablet 20 mg, Daily  aspirin EC tablet 81 mg, Daily  tamsulosin (FLOMAX) capsule 0.4 mg, Daily  vitamin C (ASCORBIC ACID) tablet 500 mg, Daily  glucose (GLUTOSE) 40 % oral gel 15 g, PRN  dextrose 50 % IV solution, PRN  glucagon (rDNA) injection 1 mg, PRN  dextrose 5 % solution, PRN        Objective:  BP (!) 149/60   Pulse 82   Temp 97.8 °F (36.6 °C) (Oral)   Resp 16   Ht 5' 7.8\" (1.722 m)   Wt 147 lb 3.2 oz (66.8 kg)   SpO2 94%   BMI 22.51 kg/m²     Intake/Output Summary (Last 24 hours) at 7/21/2020 0852  Last data filed at 7/21/2020 0518  Gross per 24 hour   Intake --   Output 975 ml   Net -975 ml      Wt Readings from Last 3 Encounters:   07/21/20 147 lb 3.2 oz (66.8 kg)   01/27/20 164 lb 6.4 oz (74.6 kg)   01/06/20 167 lb 3.2 oz (75.8 kg)       General appearance:  Appears comfortable  Eyes: Sclera clear. Pupils equal.  ENT: Moist oral mucosa. Trachea midline, no adenopathy. Cardiovascular: Regular rhythm, normal S1, S2. No murmur. No edema in lower extremities  Respiratory: Not using accessory muscles. Good inspiratory effort. Clear to auscultation bilaterally, no wheeze or crackles. GI: Abdomen soft, no tenderness, not distended  Musculoskeletal: No cyanosis in digits, neck supple  Neurology: CN 2-12 grossly intact. No speech or motor deficits  Psych: Normal affect. Alert and oriented in time, place and person  Skin: Warm, dry, normal turgor    Labs and Tests:  CBC:   Recent Labs     07/19/20  0600 07/20/20  0800 07/21/20  0550   WBC 8.6 8.4 7.7   HGB 8.5* 9.0* 8.6*    199 204     BMP:    Recent Labs     07/19/20  0600 07/20/20  0800 07/21/20  0550    143 144   K 3.9 4.0 4.2    105 106   CO2 30 30 30   BUN 42* 39* 38*   CREATININE 3.7* 3.4* 3.5*   GLUCOSE 104* 138* 139*     Hepatic:   Recent Labs     07/19/20  0600 07/20/20  0800 07/21/20  0550   AST 13* 12* 12*   ALT 29 21 18   BILITOT 0.3 0.3 0.3   ALKPHOS 65 65 61       ASSESSMENT AND PLAN    Active Problems:    Diabetes mellitus type 2 with complications (HCC)    Essential hypertension    Raynaud's disease    Type 2 diabetes mellitus with diabetic nephropathy (HCC)    Chronic renal insufficiency, stage 3 (moderate) (HCC)    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 *      1.  Type I cryoglobulinemia:     -This was diagnosed in Mar / Apr 2020 in [facility] after getting evaluated for worsening renal failure.   -Immunofixation showed cryoglobulins  -Bone marrow aspiration and biopsy showed 5 to 10% plasma cells  -Cryoglobulins were 1% and cryo crit was 1.5% in April 2020  -The patient was started on plasmapheresis treatment in June 2020  -He was also started on Velcade and dexamethasone in weekly fashion.  Most recent treatment was done on 7/14/2020  - Quantitative immunoglobulins obtained on 7/2/2020 showed IgG of 855 and IgM of 93-normal kappa lambda ratio was normal 1.36  - According to Dr. Alban Lacey, patient was not responding to plasmapheresis. - Cryoglobulin, cryocrit, and ANCA pending.  - Will receive velcade + dexamethasone today, 7/21/2020.            2.  History of squamous cell carcinoma of the lung:     -Status post definitive radiation - SBRT -in January 2020  -CT chest done at [facility] in late April 2020 showed positive response. -CT chest on 7/17/2020 shoed mod-large pleural effusion, will have thoracentesis. Fluid to be sent for cytology.         3. Anemia:     -Multifactorial  -There was no evidence of iron or B12 deficiency on the blood work done at [facility]  -Transfuse PRBCs if hemoglobin is less than 7.        4.  Respiratory failure:     -On broad-spectrum antibiotics  -Management per primary team.  -Pulmonology following.      Patient's other issues include     5.  Chronic kidney disease     5.  History of ANCA positive vasculitis     6.  Coronary artery disease     7.  Diabetes       Lilliana Trujillo, CNP  [facility]      Patient alert and oriented treatment with velcade and dexamethasone ordered by dr Jaleel Velasquez for today

## 2020-07-21 NOTE — PLAN OF CARE
Nutrition Problem #1: Inadequate oral intake  Intervention: Food and/or Nutrient Delivery: Continue Current Diet, Start Oral Nutrition Supplement  Nutritional Goals: New goal: Pt will consume at least 50% of meals and supplements

## 2020-07-21 NOTE — CARE COORDINATION
Cm went in and spoke to pt about therapy recommendations to go to snf for therapy. Pt states that he does not want to go to a facility he wants to go home. Pt also wearing 3 liters of oxygen here and states he is on 3 liters at home. Pt unable to remember name of home care services. CM called pt's wife and discussed pt wanting to come. Wife states she and pt will discuss options. Wife was able to find home care service and states it is [facility] [phone]. CM called [facility] and spoke to Brannon Jauregui and pt is active at this time for PT only. [facility] fax number is [phone] for any discharge orders.        Shaheed Saldivar RN, BSN  [phone]

## 2020-07-21 NOTE — PROGRESS NOTES
Shift assessment completed. Routine vitals stable. Scheduled medications given. Patient is awake, alert and oriented. Respirations are easy and unlabored. Patient does not appear to be in distress. Some coughing noted while patient eating breakfast. Patient in high fowlers position while eating. Patient was able to cough and clear airway. Call light within reach. Will continue to monitor.

## 2020-07-21 NOTE — PROGRESS NOTES
MD Erwin Nice MD Imelda Read, MD                  Office: [phone]                 Fax: [phone]         [address] [place] [address] Note    PATIENT NAME: Jackie Ryan  : 1940  MRN: [id number]      Assessment/Plan:  1. Acute kidney injury on CKD IV, baseline creatinine 3.4. Follows  with  Nephrology. Renal function is relatively stable to BL and he is  nonoliguric.  CTM. - further plans for Velcade, Dexa per Hem. - no further PLEX     -Discussed with patient about no significant renal improvement w/ recent PLEX + Velcade (as per Hem/onc), possibility of dialysis in near future, he is willing to proceed if needed. -s/p thoracocentesis , for ?maligant effusion - as that would change his prognosis. - have discussed w/ Dr Kerri Rosales [facility])    2. Hyperkalemia, improved. 3.  Hypertension. Variable. - further  Increase Hydralazine. From 50TID to 75TID  - w Holding parameters. 4.  MGUS with cryoglobulinemia. Hematology has been consulted. previously discussed with Hematology if needed to continue Plasmapheresis, hold for now and await cryoglobulin assay. 5.  Anemia. Hematology is following. ?MARTI. Hgb better. 6.  Acute respiratory failure, better  7. Diabetes mellitus. Management as per Medicine. High complexity. Multiple medical problems. Discussed with patient and treatment team. ,    Thank you for allowing me to participate in this patient's care. Please do not hesitate to contact me for any questions/concerns. We will follow along with you.      Emelyn Contreras MD  [facility] [address]   Phone: [phone] or [address]  Fax: [phone]        Patient Active Problem List   Diagnosis    Diabetes mellitus type 2 with complications ([facility] [address]    Essential hypertension    Hypercholesteremia    Coronary atherosclerosis    Well adult exam    Raynaud's disease    Urinary frequency    Microalbuminuria    Abdominal aortic aneurysm without rupture (HCC)    Bilateral carotid artery stenosis    Type 2 diabetes mellitus with diabetic nephropathy (HCC)    Vasculogenic erectile dysfunction    Chronic renal insufficiency, stage 3 (moderate) (HCC)    Adjustment disorder with mixed anxiety and depressed mood    BPH (benign prostatic hyperplasia)    Dyslipidemia    Hypokalemia    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 Past Social History:   reports that he quit smoking about 14 years ago. He has never used smokeless tobacco. He reports current alcohol use of about 2.0 standard drinks of alcohol per week. He reports that he does not use drugs.     Subjective:  '  Again today Seen resting in room, no active complaints,   Yesterday had tap - did well     Objective:      BP (!) 149/60   Pulse 82   Temp 97.8 °F (36.6 °C) (Oral)   Resp 16   Ht 5' 7.8\" (1.722 m)   Wt 147 lb 3.2 oz (66.8 kg)   SpO2 94%   BMI 22.51 kg/m²     Wt Readings from Last 3 Encounters:   07/21/20 147 lb 3.2 oz (66.8 kg)   01/27/20 164 lb 6.4 oz (74.6 kg)   01/06/20 167 lb 3.2 oz (75.8 kg)       BP Readings from Last 3 Encounters:   07/21/20 (!) 149/60   01/27/20 130/70   01/06/20 110/60     AAOx3   Chest- clear, has tunneled catheter  Heart-regular  Abd-soft  Ext- no edema  -+Han    Labs  Hemoglobin   Date Value Ref Range Status   07/21/2020 8.6 (L) 13.5 - 17.5 g/dL Final     Hematocrit   Date Value Ref Range Status   07/21/2020 27.4 (L) 40.5 - 52.5 % Final     WBC   Date Value Ref Range Status   07/21/2020 7.7 4.0 - 11.0 K/uL Final     Platelets   Date Value Ref Range Status

## 2020-07-21 NOTE — PROGRESS NOTES
Consent obtained for Velcade injection. Patient denies any questions or concerns regarding medication and it's side effects. Consent placed in chart.

## 2020-07-22 VITALS
HEIGHT: 68 IN | OXYGEN SATURATION: 98 % | HEART RATE: 78 BPM | WEIGHT: 147.2 LBS | TEMPERATURE: 98 F | DIASTOLIC BLOOD PRESSURE: 63 MMHG | SYSTOLIC BLOOD PRESSURE: 155 MMHG | BODY MASS INDEX: 22.31 KG/M2 | RESPIRATION RATE: 16 BRPM

## 2020-07-22 LAB
A/G RATIO: 1.4 (ref 1.1–2.2)
ALBUMIN SERPL-MCNC: 3.2 G/DL (ref 3.4–5)
ALP BLD-CCNC: 61 U/L (ref 40–129)
ALT SERPL-CCNC: 16 U/L (ref 10–40)
ANION GAP SERPL CALCULATED.3IONS-SCNC: 8 MMOL/L (ref 3–16)
APTT: 27.5 SEC (ref 24.2–36.2)
AST SERPL-CCNC: 12 U/L (ref 15–37)
BASOPHILS ABSOLUTE: 0 K/UL (ref 0–0.2)
BASOPHILS RELATIVE PERCENT: 0.1 %
BILIRUB SERPL-MCNC: <0.2 MG/DL (ref 0–1)
BUN BLDV-MCNC: 47 MG/DL (ref 7–20)
CALCIUM SERPL-MCNC: 9.7 MG/DL (ref 8.3–10.6)
CHLORIDE BLD-SCNC: 102 MMOL/L (ref 99–110)
CO2: 30 MMOL/L (ref 21–32)
CREAT SERPL-MCNC: 3.8 MG/DL (ref 0.8–1.3)
CRYOGLOBULIN, QUALITATIVE: NORMAL
D DIMER: 721 NG/ML DDU (ref 0–229)
EOSINOPHILS ABSOLUTE: 0 K/UL (ref 0–0.6)
EOSINOPHILS RELATIVE PERCENT: 0 %
FIBRINOGEN: 506 MG/DL (ref 200–397)
GFR AFRICAN AMERICAN: 19
GFR NON-AFRICAN AMERICAN: 15
GLOBULIN: 2.3 G/DL
GLUCOSE BLD-MCNC: 166 MG/DL (ref 70–99)
GLUCOSE BLD-MCNC: 190 MG/DL (ref 70–99)
HCT VFR BLD CALC: 26.9 % (ref 40.5–52.5)
HEMOGLOBIN: 8.6 G/DL (ref 13.5–17.5)
INR BLD: 0.91 (ref 0.86–1.14)
LYMPHOCYTES ABSOLUTE: 1 K/UL (ref 1–5.1)
LYMPHOCYTES RELATIVE PERCENT: 13.8 %
MCH RBC QN AUTO: 27.7 PG (ref 26–34)
MCHC RBC AUTO-ENTMCNC: 32.1 G/DL (ref 31–36)
MCV RBC AUTO: 86.4 FL (ref 80–100)
MONOCYTES ABSOLUTE: 0.3 K/UL (ref 0–1.3)
MONOCYTES RELATIVE PERCENT: 3.6 %
NEUTROPHILS ABSOLUTE: 6.1 K/UL (ref 1.7–7.7)
NEUTROPHILS RELATIVE PERCENT: 82.5 %
PDW BLD-RTO: 16.8 % (ref 12.4–15.4)
PERFORMED ON: ABNORMAL
PLATELET # BLD: 203 K/UL (ref 135–450)
PMV BLD AUTO: 8.9 FL (ref 5–10.5)
POTASSIUM REFLEX MAGNESIUM: 4.9 MMOL/L (ref 3.5–5.1)
PROTHROMBIN TIME: 10.6 SEC (ref 10–13.2)
RBC # BLD: 3.11 M/UL (ref 4.2–5.9)
SODIUM BLD-SCNC: 140 MMOL/L (ref 136–145)
SPE/IFE INTERPRETATION: NORMAL
TOTAL PROTEIN: 5.5 G/DL (ref 6.4–8.2)
WBC # BLD: 7.3 K/UL (ref 4–11)

## 2020-07-22 PROCEDURE — 6370000000 HC RX 637 (ALT 250 FOR IP): Performed by: HOSPITALIST

## 2020-07-22 PROCEDURE — 85384 FIBRINOGEN ACTIVITY: CPT

## 2020-07-22 PROCEDURE — 80053 COMPREHEN METABOLIC PANEL: CPT

## 2020-07-22 PROCEDURE — 85730 THROMBOPLASTIN TIME PARTIAL: CPT

## 2020-07-22 PROCEDURE — 85025 COMPLETE CBC W/AUTO DIFF WBC: CPT

## 2020-07-22 PROCEDURE — 2580000003 HC RX 258: Performed by: HOSPITALIST

## 2020-07-22 PROCEDURE — C9113 INJ PANTOPRAZOLE SODIUM, VIA: HCPCS | Performed by: INTERNAL MEDICINE

## 2020-07-22 PROCEDURE — 85610 PROTHROMBIN TIME: CPT

## 2020-07-22 PROCEDURE — 6370000000 HC RX 637 (ALT 250 FOR IP): Performed by: INTERNAL MEDICINE

## 2020-07-22 PROCEDURE — 85379 FIBRIN DEGRADATION QUANT: CPT

## 2020-07-22 PROCEDURE — 92526 ORAL FUNCTION THERAPY: CPT

## 2020-07-22 PROCEDURE — 36592 COLLECT BLOOD FROM PICC: CPT

## 2020-07-22 PROCEDURE — 6360000002 HC RX W HCPCS: Performed by: INTERNAL MEDICINE

## 2020-07-22 PROCEDURE — 99232 SBSQ HOSP IP/OBS MODERATE 35: CPT | Performed by: INTERNAL MEDICINE

## 2020-07-22 RX ADMIN — HEPARIN SODIUM 5000 UNITS: 5000 INJECTION INTRAVENOUS; SUBCUTANEOUS at 05:30

## 2020-07-22 RX ADMIN — PANTOPRAZOLE SODIUM 40 MG: 40 INJECTION, POWDER, FOR SOLUTION INTRAVENOUS at 08:48

## 2020-07-22 RX ADMIN — ATORVASTATIN CALCIUM 20 MG: 20 TABLET, FILM COATED ORAL at 08:48

## 2020-07-22 RX ADMIN — HYDRALAZINE HYDROCHLORIDE 75 MG: 25 TABLET, FILM COATED ORAL at 08:48

## 2020-07-22 RX ADMIN — INSULIN LISPRO 1 UNITS: 100 INJECTION, SOLUTION INTRAVENOUS; SUBCUTANEOUS at 09:00

## 2020-07-22 RX ADMIN — METOPROLOL TARTRATE 50 MG: 50 TABLET, FILM COATED ORAL at 08:49

## 2020-07-22 RX ADMIN — TAMSULOSIN HYDROCHLORIDE 0.4 MG: 0.4 CAPSULE ORAL at 08:48

## 2020-07-22 RX ADMIN — Medication 10 ML: at 08:49

## 2020-07-22 RX ADMIN — ASPIRIN 81 MG: 81 TABLET, COATED ORAL at 08:49

## 2020-07-22 RX ADMIN — ACYCLOVIR 400 MG: 200 CAPSULE ORAL at 08:48

## 2020-07-22 RX ADMIN — Medication 500 MG: at 08:48

## 2020-07-22 RX ADMIN — NIFEDIPINE 60 MG: 30 TABLET, EXTENDED RELEASE ORAL at 08:48

## 2020-07-22 ASSESSMENT — PAIN SCALES - GENERAL
PAINLEVEL_OUTOF10: 0

## 2020-07-22 NOTE — PROGRESS NOTES
Patient has been discharged per MD orders. Patient verbalizes understanding of all instructions, medications, and follow up. IV has been removed, catheter intact, patient tolerated well. CVC to groin removed at aprox 11am.  Pressure dressing applied. Hna catheter removed without issue. Patient did void after removal.  All belongings have been gathered and packed. Family in route to transport patient from hospital. Transport has been notified of discharge.

## 2020-07-22 NOTE — PROGRESS NOTES
Speech Language Pathology  Dysphagia Treatment Note    Name:  James Nurse  :   1940  Medical Diagnosis:  Acute respiratory failure ([facility] [address] [J96.00]  Acute respiratory failure ([facility] [address] [J96.00]  Treatment Diagnosis: Oropharyngeal Dysphagia  Pain level: Pt did not report pain    Current Diet Level: Dysphagia III Soft and Bite-Sized with thin liquids, no straws, meds in puree     Tolerance of Current Diet Level:Per chart review, no noted difficulty with current diet    Assessment of Texture Tolerance:  -Impressions: Pt was seen sitting upright in bed, he was consuming coffee. Pt reported no difficulty with swallowing during breakfast meal.  Trials of thin liquids and regular solids were provided. Pt demonstrated mildly prolonged mastication with regular solids, adequate oral clearing was achieved. Pt with no overt clinical s/s of aspiration/penetration. At this time, recommend continuation of current diet. Diet and Treatment Recommendations:  Dysphagia III Soft and Bite-Sized with thin liquids, no straws, meds in puree     Dysphagia Goals:  1.) Pt will tolerate recommended diet without s/s of aspiration (ongoing 2020)   2.) If clinical symptoms of penetration/aspiration continue to be noted,Pt will tolerate MBS to r/o aspiration and determine appropriate diet/liquid level. (ongoing 2020)   3.) Pt will tolerate diet advance to least restricted diet, as clinically indicated, with no signs of aspiration (ongoing 2020)     Plan:  Continued daily Dysphagia treatment with goals per plan of care. Patient/Family Education:Education given to the Pt and nurse, who verbalized understanding    Discharge Recommendations:  Pt will benefit from continued skilled Speech Therapy for Dysphagia services, prior to returning home. Timed Code Treatment: 0 minutes    Total Treatment Time: 12 minutes    If patient discharges prior to next session this note will serve as a discharge summary.      Carmen Badillo Dane Fermin., [address] [facility]  Speech-Language Pathologist

## 2020-07-22 NOTE — PROGRESS NOTES
MD Adriano Bradley MD Zaida Kida, MD                  Office: [phone]                 Fax: [phone]         1 [facility] Note    PATIENT NAME: Jerald Venegas  : 1940  MRN: [id number]      Assessment/Plan:  1. Acute kidney injury on CKD IV, baseline creatinine 3.4.    - He will continue to follow with  Nephrology. Renal function is relatively stable to BL and he is  nonoliguric.  CTM. - further plans for Velcade, Dexa per Hem. - no further PLEX     -have discussed with patient about no significant renal improvement w/ recent PLEX + Velcade (as per Hem/onc), possibility of dialysis in near future, he is willing to proceed if needed. -s/p thoracocentesis ,Cytology negative for malignancy from the pleural fluid. 2.  Hyperkalemia, improved. 3.  Hypertension. Variable. - further  Increase Hydralazine. From 50TID to 75TID  - w Holding parameters. 4.  MGUS with cryoglobulinemia. Hematology has been consulted. previously discussed with Hematology if needed to continue Plasmapheresis, hold for now and   - await cryoglobulin assay. - hem following     5. Anemia. Hematology is following. ?MARTI. Hgb better. 6.  Acute respiratory failure, better  7. Diabetes mellitus. Management as per Medicine. High complexity. Multiple medical problems. Discussed with patient and treatment team. ,    Thank you for allowing me to participate in this patient's care. Please do not hesitate to contact me for any questions/concerns. We will follow along with you.      Reny Alejandra MD  [facility]   Phone: [phone] or [employer]  Fax: [phone]        Patient Active Problem List   Diagnosis    Diabetes mellitus type 2 with complications ([facility] [address]    Essential hypertension    Hypercholesteremia    Coronary atherosclerosis    Well adult exam    Raynaud's disease    Urinary frequency    Microalbuminuria    Abdominal aortic aneurysm without rupture (HCC)    Bilateral carotid artery stenosis    Type 2 diabetes mellitus with diabetic nephropathy (HCC)    Vasculogenic erectile dysfunction    Chronic renal insufficiency, stage 3 (moderate) (HCC)    Adjustment disorder with mixed anxiety and depressed mood    BPH (benign prostatic hyperplasia)    Dyslipidemia    Hypokalemia    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 Past Social History:   reports that he quit smoking about 14 years ago. He has never used smokeless tobacco. He reports current alcohol use of about 2.0 standard drinks of alcohol per week. He reports that he does not use drugs.     Subjective:  '    Seen resting in room, no active complaints,        Objective:      /72   Pulse 84   Temp 97.2 °F (36.2 °C) (Oral)   Resp 16   Ht 5' 7.8\" (1.722 m)   Wt 147 lb 3.2 oz (66.8 kg)   SpO2 95%   BMI 22.51 kg/m²     Wt Readings from Last 3 Encounters:   07/21/20 147 lb 3.2 oz (66.8 kg)   01/27/20 164 lb 6.4 oz (74.6 kg)   01/06/20 167 lb 3.2 oz (75.8 kg)       BP Readings from Last 3 Encounters:   07/22/20 132/72   01/27/20 130/70   01/06/20 110/60     AAOx3   Chest- clear, has tunneled catheter  Heart-regular  Abd-soft  Ext- no edema  -+Han    Labs  Hemoglobin   Date Value Ref Range Status   07/22/2020 8.6 (L) 13.5 - 17.5 g/dL Final     Hematocrit   Date Value Ref Range Status   07/22/2020 26.9 (L) 40.5 - 52.5 % Final     WBC   Date Value Ref Range Status   07/22/2020 7.3 4.0 - 11.0 K/uL Final     Platelets   Date Value Ref Range Status   07/22/2020 203 135 - 450 K/uL Final     Lab Results   Component Value Date    CREATININE 3.8 (H) 07/22/2020    BUN 47 (H) 07/22/2020     07/22/2020    K 4.9 07/22/2020     07/22/2020    CO2 30 07/22/2020

## 2020-07-22 NOTE — DISCHARGE INSTR - COC
R35.0    Microalbuminuria R80.9    Abdominal aortic aneurysm without rupture (HCC) I71.4    Bilateral carotid artery stenosis I65.23    Type 2 diabetes mellitus with diabetic nephropathy (HCC) E11.21    Vasculogenic erectile dysfunction N52.9    Chronic renal insufficiency, stage 3 (moderate) (HCC) N18.3    Adjustment disorder with mixed anxiety and depressed mood F43.23    BPH (benign prostatic hyperplasia) N40.0    Dyslipidemia E78.5    Hypokalemia E87.6    Malignant neoplasm of upper lobe of lung (HCC) C34.10    Falls W19. Ruthe Fish    Other fatigue R53.83    Cryoglobulinemia (HCC) D89.1    Acute respiratory failure (HCC) J96.00       Isolation/Infection:   Isolation          Droplet Plus        Patient Infection Status     Infection Onset Added Last Indicated Last Indicated By Review Planned Expiration Resolved Resolved By    None active    Resolved    COVID-19 Rule Out 07/16/20 07/16/20 07/16/20 COVID-19 (Ordered)   07/19/20 Rule-Out Test Resulted          Nurse Assessment:  Last Vital Signs: /72   Pulse 84   Temp 97.2 °F (36.2 °C) (Oral)   Resp 16   Ht 5' 7.8\" (1.722 m)   Wt 147 lb 3.2 oz (66.8 kg)   SpO2 95%   BMI 22.51 kg/m²     Last documented pain score (0-10 scale): Pain Level: 0  Last Weight:   Wt Readings from Last 1 Encounters:   07/21/20 147 lb 3.2 oz (66.8 kg)     Mental Status:  oriented and alert    IV Access:  - Central Venous Catheter  - site  right, insertion date: 7/16/20    Nursing Mobility/ADLs:  Walking   Assisted  Transfer  Assisted  Bathing  Assisted  Dressing  Assisted  Toileting  Assisted  Feeding  Independent  Med Admin  Assisted  Med Delivery   whole    Wound Care Documentation and Therapy:        Elimination:  Continence:   · Bowel:  Yes  · Bladder: Yes  Urinary Catheter: Removal Date 7/22/20   Colostomy/Ileostomy/Ileal Conduit: No       Date of Last BM: 7/20/20    Intake/Output Summary (Last 24 hours) at 7/22/2020 0855  Last data filed at 7/22/2020 5543  Gross per 24 hour   Intake 240 ml   Output 550 ml   Net -310 ml     I/O last 3 completed shifts: In: 480 [P.O.:480]  Out: -     Safety Concerns: At Risk for Falls and Aspiration Risk    Impairments/Disabilities:      None    Nutrition Therapy:  Current Nutrition Therapy:   - Oral Diet:  Dysphagia soft and bite sized    Routes of Feeding: Oral  Liquids: Thin Liquids  Daily Fluid Restriction: no  Last Modified Barium Swallow with Video (Video Swallowing Test): not done    Treatments at the Time of Hospital Discharge:   Respiratory Treatments: Oxygen at 3.5 liters  Oxygen Therapy:  is on oxygen at 3.5 L/min per nasal cannula. Ventilator:    - No ventilator support    Rehab Therapies: Physical Therapy and Occupational Therapy  Weight Bearing Status/Restrictions: No weight bearing restirctions  Other Medical Equipment (for information only, NOT a DME order): Other Treatments:    Patient's personal belongings (please select all that are sent with patient):  None    RN SIGNATURE:  Electronically signed by Oliva Jovel RN on 7/22/20 at 9:43 AM EDT    CASE MANAGEMENT/SOCIAL WORK SECTION    Inpatient Status Date: 7/16/2020    Readmission Risk Assessment Score:  Readmission Risk              Risk of Unplanned Readmission:        27           Discharging to Facility/ Agency   · Name: [facility]   · Address:  · Phone:[phone]  · Fax:[phone]    Dialysis Facility (if applicable)   · Name:  · Address:  · Dialysis Schedule:  · Phone:  · Fax:    / signature: Flaco Meza RN, BSN  [phone]       PHYSICIAN SECTION    Prognosis: Good    Condition at Discharge: Stable    Rehab Potential (if transferring to Rehab): Good    Recommended Labs or Other Treatments After Discharge:     Physician Certification: I certify the above information and transfer of [facility]  is necessary for the continuing treatment of the diagnosis listed and that he requires Home Care for greater 30 days. Update Admission H&P: No change in H&P    PHYSICIAN SIGNATURE:  Electronically signed by Tamika Mendez MD on 7/22/20 at 8:55 AM EDT

## 2020-07-22 NOTE — PLAN OF CARE
Problem: Falls - Risk of:  Goal: Will remain free from falls  Description: Will remain free from falls  Outcome: Completed  Goal: Absence of physical injury  Description: Absence of physical injury  Outcome: Completed     Problem: Skin Integrity:  Goal: Will show no infection signs and symptoms  Description: Will show no infection signs and symptoms  Outcome: Completed  Goal: Absence of new skin breakdown  Description: Absence of new skin breakdown  Outcome: Completed  Goal: Status of oral mucous membranes will improve  Description: Status of oral mucous membranes will improve  Outcome: Completed  Goal: Complications related to intravenous access or infusion will be avoided or minimized  Description: Complications related to intravenous access or infusion will be avoided or minimized  Outcome: Completed     Problem: Nutritional:  Goal: Maintenance of adequate nutrition will improve  Description: Maintenance of adequate nutrition will improve  Outcome: Completed  Goal: Progress toward achieving an optimal weight will improve  Description: Progress toward achieving an optimal weight will improve  Outcome: Completed  Goal: Maintenance of adequate weight for body size and type will improve  Description: Maintenance of adequate weight for body size and type will improve  Outcome: Completed     Problem: Nutrition  Goal: Optimal nutrition therapy  Outcome: Completed     Problem: Musculor/Skeletal Functional Status  Goal: Highest potential functional level  Outcome: Completed  Goal: Absence of falls  Outcome: Completed     Problem: Activity:  Goal: Ability to perform activities at highest level will improve  Description: Ability to perform activities at highest level will improve  Outcome: Completed     Problem:  Bowel/Gastric:  Goal: Occurrences of nausea will decrease  Description: Occurrences of nausea will decrease  Outcome: Completed  Goal: Bowel function will improve  Description: Bowel function will

## 2020-07-22 NOTE — PROGRESS NOTES
Oncology and Hematology Care   Progress Note      7/22/2020 8:34 AM        Name: Rosas Molina . Admitted: 7/16/2020    SUBJECTIVE:  Doing okay. No new complaints. Tolerated velcade without side effects.     Reviewed interval ancillary notes    Current Medications  hydrALAZINE (APRESOLINE) tablet 75 mg, TID  acyclovir (ZOVIRAX) capsule 400 mg, BID  insulin lispro (1 Unit Dial) 0-6 Units, TID WC  insulin lispro (1 Unit Dial) 0-3 Units, Nightly  sodium chloride flush 0.9 % injection 10 mL, 2 times per day  sodium chloride flush 0.9 % injection 10 mL, PRN  acetaminophen (TYLENOL) tablet 650 mg, Q6H PRN    Or  acetaminophen (TYLENOL) suppository 650 mg, Q6H PRN  polyethylene glycol (GLYCOLAX) packet 17 g, Daily PRN  promethazine (PHENERGAN) tablet 12.5 mg, Q6H PRN    Or  ondansetron (ZOFRAN) injection 4 mg, Q6H PRN  acetaminophen (TYLENOL) tablet 650 mg, Q6H PRN    Or  acetaminophen (TYLENOL) suppository 650 mg, Q6H PRN  heparin (porcine) injection 5,000 Units, 3 times per day  pantoprazole (PROTONIX) injection 40 mg, Daily  hydrALAZINE (APRESOLINE) injection 10 mg, Q6H PRN  metoprolol tartrate (LOPRESSOR) tablet 50 mg, BID  NIFEdipine (PROCARDIA XL) extended release tablet 60 mg, Daily  atorvastatin (LIPITOR) tablet 20 mg, Daily  aspirin EC tablet 81 mg, Daily  tamsulosin (FLOMAX) capsule 0.4 mg, Daily  vitamin C (ASCORBIC ACID) tablet 500 mg, Daily  glucose (GLUTOSE) 40 % oral gel 15 g, PRN  dextrose 50 % IV solution, PRN  glucagon (rDNA) injection 1 mg, PRN  dextrose 5 % solution, PRN        Objective:  /72   Pulse 84   Temp 97.2 °F (36.2 °C) (Oral)   Resp 16   Ht 5' 7.8\" (1.722 m)   Wt 147 lb 3.2 oz (66.8 kg)   SpO2 95%   BMI 22.51 kg/m²     Intake/Output Summary (Last 24 hours) at 7/22/2020 0834  Last data filed at 7/21/2020 1302  Gross per 24 hour   Intake 240 ml   Output --   Net 240 ml      Wt Readings from Last 3 Encounters:   07/21/20 147 lb 3.2 oz (66.8 kg)   01/27/20 164 lb 6.4 oz (74.6 kg)   01/06/20 167 lb 3.2 oz (75.8 kg)       General appearance:  Appears comfortable  Eyes: Sclera clear. Pupils equal.  ENT: Moist oral mucosa. Trachea midline, no adenopathy. Cardiovascular: Regular rhythm, normal S1, S2. No murmur. No edema in lower extremities  Respiratory: Not using accessory muscles. Good inspiratory effort. Clear to auscultation bilaterally, no wheeze or crackles. GI: Abdomen soft, no tenderness, not distended  Musculoskeletal: No cyanosis in digits, neck supple  Neurology: CN 2-12 grossly intact. No speech or motor deficits  Psych: Normal affect. Alert and oriented in time, place and person  Skin: Warm, dry, normal turgor    Labs and Tests:  CBC:   Recent Labs     07/20/20  0800 07/21/20  0550 07/22/20  0540   WBC 8.4 7.7 7.3   HGB 9.0* 8.6* 8.6*    204 203     BMP:    Recent Labs     07/20/20  0800 07/21/20  0550 07/22/20  0540    144 140   K 4.0 4.2 4.9    106 102   CO2 30 30 30   BUN 39* 38* 47*   CREATININE 3.4* 3.5* 3.8*   GLUCOSE 138* 139* 190*     Hepatic:   Recent Labs     07/20/20  0800 07/21/20  0550 07/22/20  0540   AST 12* 12* 12*   ALT 21 18 16   BILITOT 0.3 0.3 <0.2   ALKPHOS 65 63 61       ASSESSMENT AND PLAN    Active Problems:    Diabetes mellitus type 2 with complications ([facility] [address]    Essential hypertension    Raynaud's disease    Type 2 diabetes mellitus with diabetic nephropathy (HCC)    Chronic renal insufficiency, stage 3 (moderate) (HCC)    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 *      1.  Type I cryoglobulinemia:     -This was diagnosed in Mar / Apr 2020 in [facility] after getting evaluated for worsening renal failure.   -Immunofixation showed cryoglobulins  -Bone marrow aspiration and biopsy showed 5 to 10% plasma cells  -Cryoglobulins were 1% and cryo crit was 1.5% in April 2020  -The patient was started on plasmapheresis treatment in June 2020  -He was also started on Velcade and dexamethasone in weekly fashion.  Most recent treatment was done on 7/14/2020  - Quantitative immunoglobulins obtained on 7/2/2020 showed IgG of 855 and IgM of 93-normal kappa lambda ratio was normal 1.36  - According to Dr. Kirk Angelucci, patient was not responding to plasmapheresis. - Cryoglobulin, cryocrit  Pending. ANCA WNL. - Received velcade + dexamethasone 7/21.            2.  History of squamous cell carcinoma of the lung:     -Status post definitive radiation - SBRT -in January 2020  -CT chest done at [facility] in late April 2020 showed positive response.  -Had thoracentesis 7/20. Fluid sent for cytology.         3. Anemia:     -Multifactorial  -There was no evidence of iron or B12 deficiency on the blood work done [facility]  -Transfuse PRBCs if hemoglobin is less than 7.        4.  Respiratory failure:     -On broad-spectrum antibiotics  -Management per primary team.  -Pulmonology following.      Patient's other issues include     5.  Chronic kidney disease     5.  History of ANCA positive vasculitis     6. Coronary artery disease     7.  Diabetes    Carolyn Linton, [address]  [phone]    Patient was seen and examined. Agree with above. Received Velcade and dexamethasone on 7/21/2020  Continue current care.     Barbie Valdovinos MD

## 2020-07-22 NOTE — CARE COORDINATION
CM noted d/c order. Called and left vm with [facility] ([phone]) that pt will be discharging home today and I will fax discharge paperwork and order and notes from physicians once it is all completed. Call back number left.      Carol Montiel RN, BSN  [phone]

## 2020-07-22 NOTE — PROGRESS NOTES
[facility] Pulmonary/CCM Progress note      Admit Date: 7/16/2020    Chief Complaint: Shortness of breath    Subjective: Interval History: Patient doing quite well, on 3 L O2 which is his baseline. Awake and alert. Denies any dyspnea or cough. Plans to discharge home today.     Scheduled Meds:   hydrALAZINE  75 mg Oral TID    acyclovir  400 mg Oral BID    insulin lispro  0-6 Units Subcutaneous TID WC    insulin lispro  0-3 Units Subcutaneous Nightly    sodium chloride flush  10 mL Intravenous 2 times per day    heparin (porcine)  5,000 Units Subcutaneous 3 times per day    pantoprazole  40 mg Intravenous Daily    metoprolol tartrate  50 mg Oral BID    NIFEdipine  60 mg Oral Daily    atorvastatin  20 mg Oral Daily    aspirin EC  81 mg Oral Daily    tamsulosin  0.4 mg Oral Daily    vitamin C  500 mg Oral Daily     Continuous Infusions:   dextrose       PRN Meds:sodium chloride flush, acetaminophen **OR** acetaminophen, polyethylene glycol, promethazine **OR** ondansetron, acetaminophen **OR** acetaminophen, hydrALAZINE, glucose, dextrose, glucagon (rDNA), dextrose    Review of Systems  Constitutional: negative for fatigue, fevers, malaise and weight loss  Ears, nose, mouth, throat: negative for ear drainage, epistaxis, hoarseness, nasal congestion, sore throat and voice change  Respiratory: negative except for shortness of breath  Cardiovascular: negative for chest pain, chest pressure/discomfort, irregular heart beat, lower extremity edema and palpitations  Gastrointestinal: negative for abdominal pain, constipation, diarrhea, jaundice, melena, odynophagia, reflux symptoms and vomiting  Hematologic/lymphatic: negative for bleeding, easy bruising, lymphadenopathy and petechiae  Musculoskeletal:negative for arthralgias, bone pain, muscle weakness, neck pain and stiff joints  Neurological: negative for dizziness, gait problems, headaches, seizures, speech problems, tremors and weakness  Behavioral/Psych: negative for anxiety, behavior problems, depression, fatigue and sleep disturbance  Endocrine: negative for diabetic symptoms including none, neuropathy, polyphagia, polyuria, polydipsia, vomiting and diarrhea and temperature intolerance  Allergic/Immunologic: negative for anaphylaxis, angioedema, hay fever and urticaria    Objective:     Patient Vitals for the past 8 hrs:   BP Temp Temp src Pulse Resp SpO2   07/22/20 1203 (!) 155/63 98 °F (36.7 °C) Oral 78 16 98 %   07/22/20 0830 132/72 97.2 °F (36.2 °C) Oral 84 16 95 %   07/22/20 0530 131/70 98 °F (36.7 °C) Oral 72 16 98 %     I/O last 3 completed shifts:   In: 480 [P.O.:480]  Out: -   I/O this shift:  In: -   Out: 550 [Urine:550]    General Appearance: alert and oriented to person, place and time, well developed and well- nourished, in no acute distress  Skin: warm and dry, no rash or erythema  Head: normocephalic and atraumatic  Eyes: pupils equal, round, and reactive to light, extraocular eye movements intact, conjunctivae normal  ENT: external ear and ear canal normal bilaterally, nose without deformity, nasal mucosa and turbinates normal  Neck: supple and non-tender without mass, no cervical lymphadenopathy  Pulmonary/Chest: clear to auscultation bilaterally- no wheezes, rales or rhonchi, normal air movement, no respiratory distress  Cardiovascular: normal rate, regular rhythm,  no murmurs, rubs, distal pulses intact, no carotid bruits  Abdomen: soft, non-tender, non-distended, normal bowel sounds, no masses or organomegaly  Lymph Nodes: Cervical, supraclavicular normal  Extremities: no cyanosis, clubbing or edema  Musculoskeletal: normal range of motion, no joint swelling, deformity or tenderness  Neurologic: alert, no focal neurologic deficits    Data Review:  CBC:   Lab Results   Component Value Date    WBC 7.3 07/22/2020    RBC 3.11 07/22/2020     BMP:   Lab Results   Component Value Date    GLUCOSE 190 07/22/2020    CO2 30 07/22/2020    BUN 47 07/22/2020 Type of Exam: Initial         FINDINGS:    Mediastinum: Mildly enlarged mediastinal lymph nodes are present.  In the    subcarinal space there is a 1.4 x 1.0 cm lymph node. Right paratracheal space    there is a borderline enlarged lymph node measuring 0.8 x 0.8 cm.  Smaller    lymph nodes elsewhere. No pericardial effusion.  Right IJ catheter extends to    the lower SVC.  Endotracheal tube terminates in the lower trachea. Nasogastric tube extends into the stomach.         Lungs/pleura:  There is a right perihilar mass.  This contacts the hilum    measuring approximately 3.9 x 3.6 cm transverse size on axial image 70 of    series 3.  Adjacent atelectasis.  There is narrowing of the proximal left    lower lobe bronchus.  Prominent size of the left hilum suggest adenopathy,    not well evaluated due to lack of contrast, blunting with adjacent    vasculature and the perihilar right lower lobe mass.  In the periphery of the    right lower lobe there is atelectasis adjacent to a moderate to large    right-sided dependent pleural effusion.         There is a small dependent left pleural effusion.  Adjacent dependent disease    is most likely atelectasis.  No pneumothorax on either side.         Upper Abdomen: Small amount of ascites adjacent to the anterior margin of the    liver.  No evidence of acute process elsewhere.         Soft Tissues/Bones: No acute or aggressive osseous findings.  Mild    degenerative changes in the thoracic spine              Impression    Right lower lobe perihilar mass highly suspicious for malignancy.  This may    invade into the adjacent proximal right lower lobe bronchus.  Alternatively    mucous plugging within the bronchus may account for the narrowing.  There    also appears to be right hilar adenopathy suspicious for metastatic disease.         Moderate to large right-sided pleural effusion.  Adjacent compressive    atelectasis.  Small amount of non dependent airspace disease elsewhere in the    right lower lobe may be due to superimposed pneumonia.         Small left pleural effusion with adjacent atelectasis.         Mildly enlarged subcarinal lymph node possibly metastatic in etiology. Problem List:   Cryoglobulinemia/CKD  At least stage III non-small cell lung cancer  Acute respiratory failure  Assessment/Plan:     Acute respiratory failure has improved-currently on nasal cannula and appears comfortable. CT imaging suggestive of right hilar disease progression-previously noted to have non-small cell lung cancer status post SBRT at [facility]. Patient will need to continue follow-up with oncology about further management. Status post right thoracentesis with 1.3 L of transudate. Cytology negative for malignancy from the pleural fluid. Cryoglobulinemia, plasma exchange is currently on hold. CKD noted. Plans for discharge home today. Pulmonary will sign off.     Reed Primrose, MD

## 2020-07-23 ENCOUNTER — CARE COORDINATION (OUTPATIENT)
Dept: CASE MANAGEMENT | Age: 80
End: 2020-07-23

## 2020-07-23 NOTE — CARE COORDINATION
Note made on 10:32 am 7/23/2020    CM faxed all paperwork to [facility] this am at approx 0830 and received call back vm confirmation from [place] that fax was received and they will be seeing pt today for services.     Mireya Murdock RN, BSN  [phone]

## 2020-07-23 NOTE — CARE COORDINATION
[facility] Initial Follow Up Call    Call within 2 business days of discharge: Yes    Patient: Monica Vital Patient : 1940   MRN: [id number]  Reason for Admission: resp failure, MARANDA, non-sm cell lung CA  Discharge Date: 20 RARS: Readmission Risk Score: 27      Last Discharge [address] [address]       Complaint Diagnosis Description Type Department Provider    20 Respiratory Distress Pneumonia due to organism . .. ED to Hosp-Admission (Discharged) (ADMITTED) FZ 5T Veronica Walton MD; Eri ZULUAGA Spoke with: [address]: [facility]    Non-face-to-face services provided:  Communication with home health agencies or other community services the patient is currently using-[facility] 24 Hour Call    Care Transitions Interventions         Follow Up: Patient states \"I was doing fine until you bothered me\". CTN explained that an outreach call could be placed at another time, patient declined. He reports that he is doing \"OK\" and does not want any further outreach calls. He does report that he has not heard from [facility]. CTN contacted [facility], spoke with \"Josef\", Director and confirmed that orders for [address] have been received and patient is scheduled for start of care today. CTN will resolve episode. No future appointments.     Dave Marquis RN

## 2020-07-27 ENCOUNTER — TELEPHONE (OUTPATIENT)
Dept: FAMILY MEDICINE CLINIC | Age: 80
End: 2020-07-27

## 2020-07-27 NOTE — TELEPHONE ENCOUNTER
----- Message from Winter Karanayse sent at 7/27/2020 12:13 PM EDT -----  Subject: Message to Provider    QUESTIONS  Information for Provider? released from hospital 7/22/20. needing referral   for rehab center. daughter cordell would like to speak with someone to help   her with what to do.   ---------------------------------------------------------------------------  --------------  CALL BACK INFO  What is the best way for the office to contact you? OK to leave message on   voicemail  Preferred Call Back Phone Number? [phone]  ---------------------------------------------------------------------------  --------------  SCRIPT ANSWERS  Relationship to Patient? Other  Representative Name? [place] [employer]  Is the Representative on the appropriate HIPAA document in Epic?  Yes

## 2020-07-29 NOTE — DISCHARGE SUMMARY
[facility] DISCHARGE SUMMARY    Patient Demographics    Patient. Inis Milder  Date of Birth. 1940  MRN. [id number]     Primary care provider. Courtney Das MD  (Tel: [phone])    Admit date: 7/16/2020    Discharge date (blank if same as Note Date): 7/22/2020  Note Date: 7/29/2020     Reason for Hospitalization. Chief Complaint   Patient presents with    Respiratory Distress     Pt in by EMS from home. At home therapy was with pt when he began to deteriorate rapidly and became unresponsive. Pt is lethargic, EMS reporting 61% on RA. 92% on NRB upon arrival.           [facility] Course. 1. Acute hypoxic respiratory failure  -Which is likely multifactorial probably acute compensation with underlying chronic lung disease  -Needed intubation on arrival patient has been extubated still pulse oxing 89%   -Continue to titrate to keep oxygen below 90.  -Patient is status post thoracocentesis with fluid removal   - pathology pending at time of dischage  - oupatient follow up with oncology  - discharged SNF      Pleural effusion  -Appreciate pulmonary recommendation  -Status post thoracocentesis.       Lung mass with hilar lymphadenopathy  -Concerning for recurrence of cancer.  -We will need outpatient oncology follow-up that he follows at   -Antibiotics has been discontinued     Bilateral pleural effusion     Acute on chronic kidney disease  -Nephrology following with history of MGUS    Consults. IP CONSULT TO HOSPITALIST  IP CONSULT TO PULMONOLOGY  IP CONSULT TO NEPHROLOGY  IP CONSULT TO ONCOLOGY  IP CONSULT TO HOME CARE NEEDS    Physical examination on discharge day. BP (!) 155/63   Pulse 78   Temp 98 °F (36.7 °C) (Oral)   Resp 16   Ht 5' 7.8\" (1.722 m)   Wt 147 lb 3.2 oz (66.8 kg)   SpO2 98%   BMI 22.51 kg/m²   General appearance. Alert. Looks comfortable. HEENT. snf  Activity. activity as tolerated  Diet: No diet orders on file      Spent 45  minutes in discharge process.     Signed:  Kirit Davison MD     7/29/2020 2:54 PM

## 2020-08-24 ENCOUNTER — TELEPHONE (OUTPATIENT)
Dept: FAMILY MEDICINE CLINIC | Age: 80
End: 2020-08-24

## 2020-08-26 ENCOUNTER — TELEPHONE (OUTPATIENT)
Dept: FAMILY MEDICINE CLINIC | Age: 80
End: 2020-08-26

## 2020-08-26 NOTE — TELEPHONE ENCOUNTER
Patient is being discharged from hospice and Earlene Mejia from [facility] Would you like to know if you would like to follow care or if they would continue care in hospice care as  Metastatic lung cancer patient.      Please advise

## 2020-08-26 NOTE — TELEPHONE ENCOUNTER
Charleen with [facility], phone number# [phone]. Patient is being sent home tomorrow with family. They are calling [facility] for hospice care. The patient's cancer has mastisized.

## 2020-08-27 NOTE — TELEPHONE ENCOUNTER
Charleen with [facility], phone number# [phone]. Patient is being sent home tomorrow with family. They are calling [facility] for hospice care. The patient's cancer has mastisized.      Please advise

## 2022-06-21 NOTE — PROGRESS NOTES
Reassessment complete. No acute changes noted to previous assessment (see doc flowsheets). Blood transfusion in process. VSS. Will continue to monitor and assess.      Electronically signed by Marvin Cooks, BSN, RN What Type Of Note Output Would You Prefer (Optional)?: Standard Output How Severe Are Your Spot(S)?: mild Have Your Spot(S) Been Treated In The Past?: has not been treated Hpi Title: Evaluation of a Skin Lesion